# Patient Record
Sex: MALE | Race: WHITE | NOT HISPANIC OR LATINO | Employment: FULL TIME | URBAN - METROPOLITAN AREA
[De-identification: names, ages, dates, MRNs, and addresses within clinical notes are randomized per-mention and may not be internally consistent; named-entity substitution may affect disease eponyms.]

---

## 2017-04-05 ENCOUNTER — OFFICE VISIT (OUTPATIENT)
Dept: OCCUPATIONAL MEDICINE | Age: 67
End: 2017-04-05

## 2017-04-05 DIAGNOSIS — Z02.1 PRE-EMPLOYMENT HEALTH SCREENING EXAMINATION: Primary | ICD-10-CM

## 2017-04-05 PROCEDURE — OH044 DRUG SCREEN HAIR COLLECTION ONLY: Performed by: PREVENTIVE MEDICINE

## 2017-07-19 ENCOUNTER — OFF PREMISE (OUTPATIENT)
Dept: OTHER | Age: 67
End: 2017-07-19

## 2017-07-26 ENCOUNTER — APPOINTMENT (OUTPATIENT)
Dept: LAB | Age: 67
End: 2017-07-26

## 2017-07-26 ENCOUNTER — OCC HEALTH (OUTPATIENT)
Dept: OCCUPATIONAL MEDICINE | Age: 67
End: 2017-07-26

## 2017-07-26 ENCOUNTER — TELEPHONE (OUTPATIENT)
Dept: OCCUPATIONAL MEDICINE | Age: 67
End: 2017-07-26

## 2017-07-26 VITALS
WEIGHT: 224 LBS | HEART RATE: 78 BPM | DIASTOLIC BLOOD PRESSURE: 80 MMHG | SYSTOLIC BLOOD PRESSURE: 128 MMHG | HEIGHT: 68 IN | BODY MASS INDEX: 33.95 KG/M2

## 2017-07-26 DIAGNOSIS — Z02.89 ENCOUNTER FOR OCCUPATIONAL HEALTH EXAMINATION: Primary | ICD-10-CM

## 2017-07-26 DIAGNOSIS — Z02.89 VISIT FOR OCCUPATIONAL HEALTH EXAMINATION: ICD-10-CM

## 2017-07-26 PROCEDURE — 36415 COLL VENOUS BLD VENIPUNCTURE: CPT | Performed by: PHYSICIAN ASSISTANT

## 2017-07-26 PROCEDURE — OH021 PRE PLACEMENT PHYSICAL EXAM: Performed by: PHYSICIAN ASSISTANT

## 2017-07-26 PROCEDURE — OH053 WHISPER TEST PERFORMED IN OCC HEALTH: Performed by: PHYSICIAN ASSISTANT

## 2017-07-26 PROCEDURE — OH138 COMPREHENSIVE EYE EXAM: Performed by: PHYSICIAN ASSISTANT

## 2018-03-26 DIAGNOSIS — R97.20 ELEVATED PSA: Primary | ICD-10-CM

## 2018-04-23 ENCOUNTER — HOSPITAL ENCOUNTER (OUTPATIENT)
Age: 68
Discharge: HOME | End: 2018-04-23

## 2018-04-23 PROCEDURE — 86481 TB AG RESPONSE T-CELL SUSP: CPT

## 2018-04-26 LAB — T-SPOT TB TEST: NORMAL

## 2018-06-01 ENCOUNTER — OFFICE VISIT (OUTPATIENT)
Dept: UROLOGY | Age: 68
End: 2018-06-01
Payer: COMMERCIAL

## 2018-06-01 ENCOUNTER — TELEPHONE (OUTPATIENT)
Dept: UROLOGY | Age: 68
End: 2018-06-01

## 2018-06-01 ENCOUNTER — OFFICE VISIT (OUTPATIENT)
Dept: INTERNAL MEDICINE | Age: 68
End: 2018-06-01
Payer: COMMERCIAL

## 2018-06-01 ENCOUNTER — HOSPITAL ENCOUNTER (OUTPATIENT)
Age: 68
Discharge: HOME | End: 2018-06-01
Payer: COMMERCIAL

## 2018-06-01 VITALS
HEART RATE: 58 BPM | WEIGHT: 224 LBS | DIASTOLIC BLOOD PRESSURE: 84 MMHG | OXYGEN SATURATION: 92 % | SYSTOLIC BLOOD PRESSURE: 132 MMHG

## 2018-06-01 VITALS
HEIGHT: 67 IN | DIASTOLIC BLOOD PRESSURE: 84 MMHG | WEIGHT: 228 LBS | BODY MASS INDEX: 35.79 KG/M2 | HEART RATE: 62 BPM | SYSTOLIC BLOOD PRESSURE: 134 MMHG

## 2018-06-01 DIAGNOSIS — Z00.00 WELL ADULT EXAM: Primary | ICD-10-CM

## 2018-06-01 DIAGNOSIS — Z13.220 SCREENING FOR HYPERLIPIDEMIA: ICD-10-CM

## 2018-06-01 DIAGNOSIS — Z23 NEED FOR PROPHYLACTIC VACCINATION AND INOCULATION AGAINST VARICELLA: ICD-10-CM

## 2018-06-01 DIAGNOSIS — F51.01 PRIMARY INSOMNIA: ICD-10-CM

## 2018-06-01 DIAGNOSIS — I49.3 FREQUENT PVCS: ICD-10-CM

## 2018-06-01 DIAGNOSIS — R39.9 LOWER URINARY TRACT SYMPTOMS (LUTS): ICD-10-CM

## 2018-06-01 DIAGNOSIS — R97.20 ELEVATED PSA: Primary | ICD-10-CM

## 2018-06-01 DIAGNOSIS — I10 BENIGN ESSENTIAL HTN: ICD-10-CM

## 2018-06-01 DIAGNOSIS — Z00.00 WELL ADULT EXAM: ICD-10-CM

## 2018-06-01 LAB
APPEARANCE FLUID: ABNORMAL
BILIRUBIN, POC: ABNORMAL
BLOOD URINE, POC: ABNORMAL
CLARITY, POC: CLEAR
COLOR, POC: YELLOW
GLUCOSE URINE, POC: ABNORMAL
HEPATITIS C ANTIBODY: NONREACTIVE
KETONES, POC: ABNORMAL
LEUKOCYTE EST, POC: ABNORMAL
NITRITE, POC: ABNORMAL
PH, POC: 6
PROTEIN, POC: ABNORMAL
SPECIFIC GRAVITY, POC: 1.01
UROBILINOGEN, POC: 0.2

## 2018-06-01 PROCEDURE — 86803 HEPATITIS C AB TEST: CPT

## 2018-06-01 PROCEDURE — 36415 COLL VENOUS BLD VENIPUNCTURE: CPT

## 2018-06-01 PROCEDURE — 99999 POCT URINE DIPSTICK: CPT | Performed by: UROLOGY

## 2018-06-01 PROCEDURE — 99204 OFFICE O/P NEW MOD 45 MIN: CPT | Performed by: UROLOGY

## 2018-06-01 PROCEDURE — 99204 OFFICE O/P NEW MOD 45 MIN: CPT

## 2018-06-01 PROCEDURE — 81002 URINALYSIS NONAUTO W/O SCOPE: CPT | Performed by: UROLOGY

## 2018-06-01 PROCEDURE — 99387 INIT PM E/M NEW PAT 65+ YRS: CPT | Performed by: INTERNAL MEDICINE

## 2018-06-01 RX ORDER — CIPROFLOXACIN 500 MG/1
500 TABLET, FILM COATED ORAL 2 TIMES DAILY
Qty: 6 TABLET | Refills: 0 | Status: SHIPPED | OUTPATIENT
Start: 2018-06-01 | End: 2018-06-04

## 2018-06-01 RX ORDER — NITROGLYCERIN 0.4 MG/1
0.4 TABLET SUBLINGUAL EVERY 5 MIN PRN
COMMUNITY
Start: 2018-03-20 | End: 2020-08-27 | Stop reason: SDUPTHER

## 2018-06-01 RX ORDER — CLONIDINE HYDROCHLORIDE 0.1 MG/1
0.1 TABLET ORAL 2 TIMES DAILY
Qty: 180 TABLET | Refills: 3 | Status: SHIPPED | OUTPATIENT
Start: 2018-06-01 | End: 2019-03-06 | Stop reason: SDUPTHER

## 2018-06-01 RX ORDER — ALLOPURINOL 300 MG/1
300 TABLET ORAL DAILY
Qty: 90 TABLET | Refills: 3 | Status: SHIPPED | OUTPATIENT
Start: 2018-06-01 | End: 2019-03-06 | Stop reason: SDUPTHER

## 2018-06-01 RX ORDER — CLONIDINE HYDROCHLORIDE 0.1 MG/1
TABLET ORAL
COMMUNITY
Start: 2018-03-20 | End: 2018-06-01 | Stop reason: SDUPTHER

## 2018-06-01 RX ORDER — AMLODIPINE BESYLATE 5 MG/1
5 TABLET ORAL
COMMUNITY
Start: 2018-03-20 | End: 2018-06-01 | Stop reason: SDUPTHER

## 2018-06-01 RX ORDER — CEPHALEXIN 500 MG/1
500 CAPSULE ORAL 3 TIMES DAILY
Qty: 15 CAPSULE | Refills: 0 | Status: SHIPPED | OUTPATIENT
Start: 2018-06-01 | End: 2018-06-06

## 2018-06-01 RX ORDER — ROSUVASTATIN CALCIUM 10 MG/1
10 TABLET, COATED ORAL DAILY
Qty: 90 TABLET | Refills: 3 | Status: SHIPPED | OUTPATIENT
Start: 2018-06-01 | End: 2019-03-06 | Stop reason: SDUPTHER

## 2018-06-01 RX ORDER — MELOXICAM 7.5 MG/1
7.5 TABLET ORAL 2 TIMES DAILY
Qty: 180 TABLET | Refills: 3 | Status: SHIPPED | OUTPATIENT
Start: 2018-06-01 | End: 2019-03-06 | Stop reason: SDUPTHER

## 2018-06-01 RX ORDER — FUROSEMIDE 20 MG/1
20 TABLET ORAL
COMMUNITY
Start: 2017-03-31 | End: 2018-06-01 | Stop reason: SDUPTHER

## 2018-06-01 RX ORDER — AMLODIPINE BESYLATE 5 MG/1
5 TABLET ORAL DAILY
Qty: 90 TABLET | Refills: 3 | Status: SHIPPED | OUTPATIENT
Start: 2018-06-01 | End: 2019-03-06 | Stop reason: SDUPTHER

## 2018-06-01 RX ORDER — LISINOPRIL 10 MG/1
10 TABLET ORAL DAILY
Qty: 90 TABLET | Refills: 3 | Status: SHIPPED | OUTPATIENT
Start: 2018-06-01 | End: 2018-06-05 | Stop reason: SDUPTHER

## 2018-06-01 RX ORDER — ROSUVASTATIN CALCIUM 10 MG/1
10 TABLET, COATED ORAL
COMMUNITY
Start: 2018-03-20 | End: 2018-06-01 | Stop reason: SDUPTHER

## 2018-06-01 RX ORDER — FUROSEMIDE 20 MG/1
20 TABLET ORAL DAILY
Qty: 90 TABLET | Refills: 3 | Status: SHIPPED | OUTPATIENT
Start: 2018-06-01 | End: 2019-03-06 | Stop reason: SDUPTHER

## 2018-06-01 RX ORDER — MELOXICAM 7.5 MG/1
7.5 TABLET ORAL
COMMUNITY
Start: 2018-03-20 | End: 2018-06-01 | Stop reason: SDUPTHER

## 2018-06-01 RX ORDER — FLECAINIDE ACETATE 100 MG/1
100 TABLET ORAL 2 TIMES DAILY
Qty: 180 TABLET | Refills: 3 | Status: SHIPPED | OUTPATIENT
Start: 2018-06-01 | End: 2019-03-06 | Stop reason: SDUPTHER

## 2018-06-01 RX ORDER — FLECAINIDE ACETATE 100 MG/1
TABLET ORAL
COMMUNITY
Start: 2018-03-20 | End: 2018-06-01 | Stop reason: SDUPTHER

## 2018-06-01 RX ORDER — ZOLPIDEM TARTRATE 5 MG/1
5 TABLET ORAL
COMMUNITY
Start: 2018-03-20 | End: 2018-06-01 | Stop reason: SDUPTHER

## 2018-06-01 RX ORDER — ALLOPURINOL 300 MG/1
300 TABLET ORAL
COMMUNITY
Start: 2018-03-20 | End: 2018-06-01 | Stop reason: SDUPTHER

## 2018-06-01 RX ORDER — ZOLPIDEM TARTRATE 5 MG/1
5 TABLET ORAL NIGHTLY PRN
Qty: 90 TABLET | Refills: 0 | Status: SHIPPED | OUTPATIENT
Start: 2018-06-01 | End: 2018-06-05 | Stop reason: SDUPTHER

## 2018-06-01 RX ORDER — LISINOPRIL 10 MG/1
TABLET ORAL
COMMUNITY
Start: 2018-03-20 | End: 2018-06-01 | Stop reason: SDUPTHER

## 2018-06-01 ASSESSMENT — ENCOUNTER SYMPTOMS
CHEST TIGHTNESS: 1
EYES NEGATIVE: 1
GASTROINTESTINAL NEGATIVE: 1
ALLERGIC/IMMUNOLOGIC NEGATIVE: 1

## 2018-06-01 ASSESSMENT — PATIENT HEALTH QUESTIONNAIRE - PHQ9
SUM OF ALL RESPONSES TO PHQ QUESTIONS 1-9: 0
2. FEELING DOWN, DEPRESSED OR HOPELESS: 0
SUM OF ALL RESPONSES TO PHQ9 QUESTIONS 1 & 2: 0
1. LITTLE INTEREST OR PLEASURE IN DOING THINGS: 0

## 2018-06-02 ENCOUNTER — NURSE TRIAGE (OUTPATIENT)
Dept: OTHER | Facility: CLINIC | Age: 68
End: 2018-06-02

## 2018-06-05 ENCOUNTER — TELEPHONE (OUTPATIENT)
Dept: INTERNAL MEDICINE | Age: 68
End: 2018-06-05

## 2018-06-05 DIAGNOSIS — F51.01 PRIMARY INSOMNIA: ICD-10-CM

## 2018-06-05 DIAGNOSIS — Z12.11 COLON CANCER SCREENING: Primary | ICD-10-CM

## 2018-06-05 DIAGNOSIS — G47.30 SLEEP APNEA, UNSPECIFIED TYPE: Primary | ICD-10-CM

## 2018-06-05 DIAGNOSIS — G47.33 OSA ON CPAP: Primary | ICD-10-CM

## 2018-06-05 DIAGNOSIS — Z99.89 OSA ON CPAP: Primary | ICD-10-CM

## 2018-06-05 RX ORDER — ZOLPIDEM TARTRATE 5 MG/1
TABLET ORAL
Qty: 135 TABLET | Refills: 1 | Status: SHIPPED | OUTPATIENT
Start: 2018-06-05 | End: 2018-12-03 | Stop reason: SDUPTHER

## 2018-06-05 RX ORDER — NEBULIZER ACCESSORIES
KIT MISCELLANEOUS
Qty: 1 DEVICE | Refills: 0 | Status: SHIPPED | OUTPATIENT
Start: 2018-06-05 | End: 2019-04-22

## 2018-06-05 RX ORDER — LISINOPRIL 10 MG/1
TABLET ORAL
Qty: 225 TABLET | Refills: 1 | Status: SHIPPED | OUTPATIENT
Start: 2018-06-05 | End: 2018-12-03 | Stop reason: SDUPTHER

## 2018-06-11 DIAGNOSIS — R97.20 ELEVATED PSA: Primary | ICD-10-CM

## 2018-06-12 ENCOUNTER — TELEPHONE (OUTPATIENT)
Dept: UROLOGY | Age: 68
End: 2018-06-12

## 2018-06-12 DIAGNOSIS — R97.20 ELEVATED PSA: Primary | ICD-10-CM

## 2018-06-15 ENCOUNTER — TELEPHONE (OUTPATIENT)
Dept: UROLOGY | Age: 68
End: 2018-06-15

## 2018-06-18 ENCOUNTER — OFFICE VISIT (OUTPATIENT)
Dept: UROLOGY | Age: 68
End: 2018-06-18
Payer: COMMERCIAL

## 2018-06-18 VITALS
SYSTOLIC BLOOD PRESSURE: 118 MMHG | BODY MASS INDEX: 35.85 KG/M2 | HEIGHT: 67 IN | WEIGHT: 228.4 LBS | DIASTOLIC BLOOD PRESSURE: 74 MMHG

## 2018-06-18 DIAGNOSIS — R39.15 URINARY URGENCY: Primary | ICD-10-CM

## 2018-06-18 LAB
BILIRUBIN URINE: NEGATIVE
BLOOD URINE, POC: NEGATIVE
CHARACTER, URINE: CLEAR
COLOR, URINE: YELLOW
GLUCOSE URINE: NEGATIVE MG/DL
KETONES, URINE: NEGATIVE
LEUKOCYTE CLUMPS, URINE: ABNORMAL
NITRITE, URINE: NEGATIVE
PH, URINE: 6
POST VOID RESIDUAL (PVR): 100 ML
PROTEIN, URINE: NEGATIVE MG/DL
SPECIFIC GRAVITY, URINE: 1.01 (ref 1–1.03)
UROBILINOGEN, URINE: 0.2 EU/DL

## 2018-06-18 PROCEDURE — 99244 OFF/OP CNSLTJ NEW/EST MOD 40: CPT | Performed by: UROLOGY

## 2018-06-18 PROCEDURE — 51798 US URINE CAPACITY MEASURE: CPT | Performed by: UROLOGY

## 2018-06-18 PROCEDURE — 81003 URINALYSIS AUTO W/O SCOPE: CPT | Performed by: UROLOGY

## 2018-06-18 RX ORDER — TAMSULOSIN HYDROCHLORIDE 0.4 MG/1
0.4 CAPSULE ORAL NIGHTLY
Qty: 90 CAPSULE | Refills: 3 | Status: ON HOLD | OUTPATIENT
Start: 2018-06-18 | End: 2018-11-10 | Stop reason: HOSPADM

## 2018-06-18 ASSESSMENT — ENCOUNTER SYMPTOMS
EYE PAIN: 0
GASTROINTESTINAL NEGATIVE: 1
COLOR CHANGE: 0
SHORTNESS OF BREATH: 0
FACIAL SWELLING: 0
EYES NEGATIVE: 1
RESPIRATORY NEGATIVE: 1
CHEST TIGHTNESS: 0
ABDOMINAL PAIN: 0
EYE REDNESS: 0
NAUSEA: 0
BACK PAIN: 1

## 2018-06-27 ENCOUNTER — TELEPHONE (OUTPATIENT)
Dept: INTERNAL MEDICINE | Age: 68
End: 2018-06-27

## 2018-06-29 ENCOUNTER — HOSPITAL ENCOUNTER (OUTPATIENT)
Dept: MRI IMAGING | Age: 68
Discharge: HOME | End: 2018-06-29
Payer: COMMERCIAL

## 2018-06-29 DIAGNOSIS — R97.20 ELEVATED PSA: ICD-10-CM

## 2018-06-29 LAB — POC CREATININE WHOLE BLOOD: 1.2 MG/DL (ref 0.5–1.2)

## 2018-06-29 PROCEDURE — A9579 GAD-BASE MR CONTRAST NOS,1ML: HCPCS | Performed by: UROLOGY

## 2018-06-29 PROCEDURE — 82565 ASSAY OF CREATININE: CPT

## 2018-06-29 PROCEDURE — 72197 MRI PELVIS W/O & W/DYE: CPT

## 2018-06-29 PROCEDURE — 6360000004 HC RX CONTRAST MEDICATION: Performed by: UROLOGY

## 2018-06-29 RX ADMIN — GADOTERIDOL 20 ML: 279.3 INJECTION, SOLUTION INTRAVENOUS at 19:11

## 2018-07-01 PROBLEM — Z00.00 WELL ADULT EXAM: Status: RESOLVED | Noted: 2018-06-01 | Resolved: 2018-07-01

## 2018-07-01 PROBLEM — Z13.220 SCREENING FOR HYPERLIPIDEMIA: Status: RESOLVED | Noted: 2018-06-01 | Resolved: 2018-07-01

## 2018-07-02 ENCOUNTER — TELEPHONE (OUTPATIENT)
Dept: UROLOGY | Age: 68
End: 2018-07-02

## 2018-07-03 NOTE — TELEPHONE ENCOUNTER
Please let Patience Patel know that MRI showed two score 3 lesions. These lesions are intermediate and the presence of clinically significant cancer is equivocal. Dr. Carlee Calderon suggests we biopsy any grade 3 or higher lesions seen on MRI. Does patient wish to proceed with MRI guided prostate biopsy?

## 2018-07-05 ENCOUNTER — TELEPHONE (OUTPATIENT)
Dept: UROLOGY | Age: 68
End: 2018-07-05

## 2018-07-05 DIAGNOSIS — R97.20 ELEVATED PSA: Primary | ICD-10-CM

## 2018-07-08 RX ORDER — CIPROFLOXACIN 500 MG/1
TABLET, FILM COATED ORAL
Qty: 6 TABLET | Refills: 0 | Status: ON HOLD | OUTPATIENT
Start: 2018-07-08 | End: 2018-11-09 | Stop reason: ALTCHOICE

## 2018-07-08 RX ORDER — GENTAMICIN SULFATE 40 MG/ML
80 INJECTION, SOLUTION INTRAMUSCULAR; INTRAVENOUS ONCE
Qty: 2 ML | Refills: 0 | Status: SHIPPED | OUTPATIENT
Start: 2018-07-08 | End: 2018-07-08

## 2018-07-09 ENCOUNTER — TELEPHONE (OUTPATIENT)
Dept: UROLOGY | Age: 68
End: 2018-07-09

## 2018-07-09 DIAGNOSIS — R97.20 ELEVATED PSA: Primary | ICD-10-CM

## 2018-07-09 RX ORDER — LORAZEPAM 1 MG/1
1 TABLET ORAL ONCE
Qty: 1 TABLET | Refills: 0 | Status: SHIPPED | OUTPATIENT
Start: 2018-07-09 | End: 2018-07-09

## 2018-07-09 NOTE — TELEPHONE ENCOUNTER
Melinda Garg,    Dr. Judson Ramirez left a message stating he was returning your call from Friday. His call back number is; [phone]. Please advise, thank you.

## 2018-07-18 ENCOUNTER — TELEPHONE (OUTPATIENT)
Dept: UROLOGY | Age: 68
End: 2018-07-18

## 2018-07-18 NOTE — TELEPHONE ENCOUNTER
Patient called office to correct his mailing address. Reviewed his prep instructions for Trus biopsy. Gentamycin will be given from stock supply. Consent to be signed on arrival.  Patient voiced understanding. Patient notes that he will be OOT the next two weeks. Instructions mailed to him.

## 2018-07-18 NOTE — TELEPHONE ENCOUNTER
Voice message left for pt to call the office to confirm mailing address. His instructions for the [address] [place] [address] Bx appointment was returned to the office.

## 2018-07-24 ENCOUNTER — TELEPHONE (OUTPATIENT)
Dept: UROLOGY | Age: 68
End: 2018-07-24

## 2018-07-24 NOTE — TELEPHONE ENCOUNTER
Dr. Vilma Gonzalez he mean referral to genetic counseling? Or is he talking about possible Decipher or Confirm MDX after biopsy? He is scheduled for biopsy on 8/9/18. Not sure what you discussed at appointment.

## 2018-07-24 NOTE — TELEPHONE ENCOUNTER
Dr. Janell Rodriguez,    Patient left message on voicemail today stating he \"would like to go through with the genetic testing due to his family hx\"    Please advise, thank you.

## 2018-07-25 NOTE — TELEPHONE ENCOUNTER
Marsh Pallas him we can send biopsy for decipher testing if it comes back positive for Althea 6. We will send for Confirm MDX if it's negative.

## 2018-08-09 ENCOUNTER — PROCEDURE VISIT (OUTPATIENT)
Dept: UROLOGY | Age: 68
End: 2018-08-09
Payer: COMMERCIAL

## 2018-08-09 VITALS
DIASTOLIC BLOOD PRESSURE: 70 MMHG | SYSTOLIC BLOOD PRESSURE: 122 MMHG | WEIGHT: 223 LBS | HEIGHT: 67 IN | BODY MASS INDEX: 35 KG/M2

## 2018-08-09 DIAGNOSIS — R93.5 ABNORMAL MRI, PELVIS: ICD-10-CM

## 2018-08-09 DIAGNOSIS — R97.20 ELEVATED PSA: Primary | ICD-10-CM

## 2018-08-09 PROCEDURE — 99999 PR SONO GUIDE NEEDLE BIOPSY: CPT | Performed by: UROLOGY

## 2018-08-09 PROCEDURE — 76872 US TRANSRECTAL: CPT | Performed by: UROLOGY

## 2018-08-09 PROCEDURE — 55700 PR BIOPSY OF PROSTATE,NEEDLE/PUNCH: CPT | Performed by: UROLOGY

## 2018-08-09 PROCEDURE — 96372 THER/PROPH/DIAG INJ SC/IM: CPT | Performed by: UROLOGY

## 2018-08-09 PROCEDURE — 99999 PR OFFICE/OUTPT VISIT,PROCEDURE ONLY: CPT | Performed by: UROLOGY

## 2018-08-09 RX ORDER — GENTAMICIN SULFATE 40 MG/ML
80 INJECTION, SOLUTION INTRAMUSCULAR; INTRAVENOUS ONCE
Status: COMPLETED | OUTPATIENT
Start: 2018-08-09 | End: 2018-08-09

## 2018-08-09 RX ADMIN — GENTAMICIN SULFATE 80 MG: 40 INJECTION, SOLUTION INTRAMUSCULAR; INTRAVENOUS at 09:21

## 2018-08-09 NOTE — PROGRESS NOTES
Mr. Denisse Zapien was seen in follow up for his prostate biopsy. The biopsy was indicated and is being performed for Elevated PSA. The biopsy is being done with MRI / Ultrasound fusion using the Speak With Me system. The biopsy was done without difficulty or complication. TRUS prostate biopsy note:  After obtaining informed consent, the rectal ultrasound probe was passed per rectum and the prostate visualized. A local block was performed by instilling 2% lidocaine at the base. Measurements were taken and the prostate measured 5.40 x 5.23 x 5.02 mm for a total volume of 74.22 cc. At this point, prostate biopsy was performed. Using ERUCES, the ultrasound and MRI images were fused and then biopsies of the lesions identified by the radiologist were taken. Three cores were taken from each of the 2 lesions seen on MRI. Two cores were then  taken at the base, the mid-portion, and the apex of the gland on either side for a total of 12 cores. The rectal probe was removed and there was minimal bleeding. Mr. Denisse Zapien tolerated the procedure well and there were no complications. Prostate size: 74.22 cc  Prostatic calcifications: no   Hypoechoic areas: no  Cores taken: 6 + 3 cores each from 2 lesions making 12 total cores  Complications: none      Assessment:      Prostate biopsy performed without difficulty. This was done with UroNav MRI fused guidance. Plan:        I will see Ana Maria Carranza back to discuss the pathology in 1-2 weeks. Further recommendations will be based on these results.

## 2018-08-09 NOTE — PROGRESS NOTES
Procedure: Trus/Biopsy  Pt name and birth date verified Yes  Patient agrees  Is taking biopsies of the prostate Yes  Patient took Enema 2 hours prior to procedure Yes  Patient took  pill(s) of cipro day before procedure, day of, and will the day after Yes  Has patient eaten today? Banana  Patient stopped all blood thinners prior to surgery Yes-Mobic stopped last Friday, no asa 81 mg for over 2 weeks       Following Dr. Anisha Ladd of care.   Gentamicin 80MG/2ML GIVEN I.M Rt UOQ HIP  Lot Number: [id number]  Expiration Date: 12-1-2018  Dwight Little 47 #: [id number]    Patient supplied their own medications Yes

## 2018-08-17 ENCOUNTER — TELEPHONE (OUTPATIENT)
Dept: UROLOGY | Age: 68
End: 2018-08-17

## 2018-08-27 ENCOUNTER — TELEPHONE (OUTPATIENT)
Dept: UROLOGY | Age: 68
End: 2018-08-27

## 2018-08-29 ENCOUNTER — TELEPHONE (OUTPATIENT)
Dept: UROLOGY | Age: 68
End: 2018-08-29

## 2018-08-29 NOTE — TELEPHONE ENCOUNTER
Patient called in regarding some questions he has for Dr. Keya Wagner. I attempted to call patient to clarify his questions, no answer, I left a message for him to call the office at his convenience.

## 2018-08-31 NOTE — TELEPHONE ENCOUNTER
Dr. Florencia Gaspar,    Mr. Addy Lindsay is wanting to know what the urgency of his surgery is. He is wanting to know if you think the surgery could wait until after the first of the year? Please advise, thank you. Patient also requesting a copy of his path report. I have mailed those results to his home address in [facility].

## 2018-09-06 ENCOUNTER — TELEPHONE (OUTPATIENT)
Dept: UROLOGY | Age: 68
End: 2018-09-06

## 2018-09-06 NOTE — TELEPHONE ENCOUNTER
Dr Aditya Kramer is asking for a call. To discuss his pathology report and the urgency for surgery. Dr Viola Duenas has tried to contact pt on 8/27/18.

## 2018-09-10 ENCOUNTER — TELEPHONE (OUTPATIENT)
Dept: UROLOGY | Age: 68
End: 2018-09-10

## 2018-09-10 DIAGNOSIS — I10 BENIGN ESSENTIAL HTN: ICD-10-CM

## 2018-09-10 DIAGNOSIS — Z01.818 PRE-OP TESTING: ICD-10-CM

## 2018-09-10 DIAGNOSIS — C61 PROSTATE CANCER (HCC): Primary | ICD-10-CM

## 2018-09-10 NOTE — TELEPHONE ENCOUNTER
Patient returned call. Reviewed Dr Fraga Cost schedule for surgery. Robot assisted laparoscopic radical prostatectomy, pelvis lymph lymph nodes dissection. At [facility]. Dr Niels Chavez will be OOT in October returning on 10/27/18. He is interested in Nov 9, 2018 0730 arrival 0600. Personal fax# [phone]. Patient has been seen by Dr Km Trejo cardiology in [facility]. Stress test, echocardiogram, EKG done recently. Orders for the CBC, BMP, U/A, chest x-ray faxed to personal fax#. Explained that clearance from both PCP (Dr Rudy Rojas) and from Dr Km Trejo will be required. Patient voiced understanding. Explained the referral to [facility] for pelvic floor therapy. Patient states that he has a 1/2 day off. He will contact Cindy's office to make that hunter't. Spoke to Arjun, , for Raisa Michele. Referral faxed. Discussed with pt the bowel prep for the day before the surgery. Instructions will be mailed to pt. Discussed holding the blood thinners, including asa, Mobic the week prior to surgery. He voiced understanding.
[facility]   KEM Pruitt, [address] [facility]  [facility]BA GRANADOS [facility], [address]  [phone]        Surgery Bowel Preparation    Purchase a 10 ounce bottle of Magnesium Citrate at your pharmacy and drink the afternoon before your scheduled surgery. Suggest drinking it chilled. Start a clear liquid diet (for dinner) the evening before surgery. You may have the following:   Water   Apple, grape or cranberry juice   Clear, fat free broth   Clear sodas (7-up, Sprite)   Plain gelatin (Jell-o)   Popsicles without bits of fruit or fruit pulp   Tea or coffee without milk or cream    7:00pm-evening before surgery-Fleets enema. Follow the instructions on the label. Nothing to eat or drink after midnight before your scheduled surgery. Please contact our office at [phone] if you have any questions.
Robotic Surgery Scheduling Form   [address] [facility] [address]    Phone * [phone]   Surgical Scheduling Direct line Phone * [phone]  Fax * [phone]      King Keenan      1950    male    [id number]  [place] [address]  Marital Status:         Home Phone: [phone]   Cell Phone:   Telephone Information:   Mobile [phone]              Surgeon: Dr Yovana Arreaga Surgery Date:11/9/2018 Time: 0730     Procedure: Robot assisted laparoscopic radical prostatectomy with pelvic lymph nodes dissection       Inpatient       Diagnosis: Prostate cancer    Important Medical History: In Epic    Special Inst/Equip: ROBOT    CPT Codes: 18457    Latex Allergy:   no Cardiac Device:  no    Case Location:  Main OR     Preadmission Testing: Phone Call      PAT Date and Time: ________________________________    PAT Confirmation #: _________________________________    Post Op Visit:  ______________________________________    Need Preop Cardiac Clearance:   Appointment with cardiology in [place]    Does patient have Cardiologist/physician?    Dr Kell Lomeli #:  ______________________________________________    [place]: __________________________________ Date:____________________    Firefly:  no    Dual Console:  no   Ultrasound:  no    Single Site: no    RNFA (colon resection only): no Assisting Surgeon: Mini Rivera PA-C    Insurance Company Name:  [employer] [place]
Unacceptable-Communication to Follow  Comments:_____________________________________________________  ________________________________________________________________________  Physician ___________________________  Date:_______________  Physician Printed Name:  _________________________    Romana Peeks  [phone]

## 2018-09-11 ENCOUNTER — TELEPHONE (OUTPATIENT)
Dept: UROLOGY | Age: 68
End: 2018-09-11

## 2018-09-11 NOTE — TELEPHONE ENCOUNTER
Per Bakari Purdy, [address] I sent pt's decipher form in today. Also mailed copy of patient's prostate bx path to his home per Tristin Vasquez. See scan.

## 2018-09-12 ENCOUNTER — HOSPITAL ENCOUNTER (OUTPATIENT)
Age: 68
Discharge: HOME | End: 2018-09-14
Payer: COMMERCIAL

## 2018-09-12 ENCOUNTER — HOSPITAL ENCOUNTER (OUTPATIENT)
Dept: GENERAL RADIOLOGY | Age: 68
Discharge: HOME | End: 2018-09-14
Payer: COMMERCIAL

## 2018-09-12 DIAGNOSIS — C61 PROSTATE CANCER (HCC): ICD-10-CM

## 2018-09-12 DIAGNOSIS — I10 BENIGN ESSENTIAL HTN: ICD-10-CM

## 2018-09-12 DIAGNOSIS — Z01.818 PRE-OP TESTING: ICD-10-CM

## 2018-09-12 PROCEDURE — 71046 X-RAY EXAM CHEST 2 VIEWS: CPT

## 2018-09-13 ENCOUNTER — TELEPHONE (OUTPATIENT)
Dept: UROLOGY | Age: 68
End: 2018-09-13

## 2018-09-13 DIAGNOSIS — C61 PROSTATE CANCER (HCC): Primary | ICD-10-CM

## 2018-09-25 ENCOUNTER — TELEPHONE (OUTPATIENT)
Dept: UROLOGY | Age: 68
End: 2018-09-25

## 2018-09-25 NOTE — TELEPHONE ENCOUNTER
Please let Dr. Makayla Ross know that Decipher came back high risk ands mail him a copy of it.  Thanks

## 2018-09-26 NOTE — TELEPHONE ENCOUNTER
Contacted patient and results of Decipher testing given. Decipher results printed and mailed to patient. He requested the results be faxed to the genetic counselor, Samara Aggarwal. He voiced understanding and was appreciative of the call.

## 2018-10-03 ENCOUNTER — INITIAL CONSULT (OUTPATIENT)
Dept: ONCOLOGY | Age: 68
End: 2018-10-03
Payer: COMMERCIAL

## 2018-10-03 DIAGNOSIS — C61 PROSTATE CANCER (HCC): Primary | ICD-10-CM

## 2018-10-03 DIAGNOSIS — Z80.42 FAMILY HISTORY OF PROSTATE CANCER: ICD-10-CM

## 2018-10-03 PROCEDURE — 96040 PR GENETIC COUNSELING, EACH 30 MIN: CPT | Performed by: GENETIC COUNSELOR, MS

## 2018-10-05 NOTE — PROGRESS NOTES
3 [facility] Program   Hereditary Cancer Risk Assessment     Name: Timmothy Holter   YOB: 1950   Date of Consultation: 10/3/18    Mr. Suzi Morrissey was seen at the [facility] for genetic counseling on 10/3/18. Mr. Suzi Morrissey was referred by Dr. Suzanna Collet, MD due to his  personal and family history of prostate cancer. Mr. Karthik Juarez wife, Allen Rich, was also present at the appointment. PERSONAL AND FAMILY HISTORY   Mr. Suzi Morrissey is a 76 y.o.  male who was recently diagnosed with prostate cancer at age 79 after experiencing a rising PSA. Pathology revealed an invasive adenocarcinoma of grade group 6 and 7. He did complete somatic tumor genetic testing through [facility] which categorized his cancer as high risk. He plans to undergo surgical treatment. Mr. Karthik Juarez family history is significant for his father with prostate cancer diagnosed at age 71 (vashti 8) and his paternal grandfather with prostate cancer diagnosed at age 76 (vashti unknown). He has two paternal uncles living in their 66-80s who have not developed cancer as well as several paternal first cousins. Mr. Suzi Morrissey also has a family history of prostate cancer on his maternal side, including his maternal grandfather diagnosed at an older age and living to age 80. There are several unknown cancers in his maternal family including his maternal aunt who passed away at age 48 and a maternal great uncle. A distant cousin passed away from brain cancer in her 25s. In addition, Mr. Suzi Morrissey has two full sisters who are in their 62s who have not developed cancer. Mr. Suzi Morrissey has 4 sons and numerous grandchildren. Mr. Suzi Morrissey reports [place], [place], [place] and [place] ancestry and denies any known Ashkenazi [place] heritage. RISK ASSESSMENT   We discussed that approximately 5-10% of cancers are due to a hereditary gene mutation which causes an increased risk for certain cancers.

## 2018-10-12 ENCOUNTER — TELEPHONE (OUTPATIENT)
Dept: UROLOGY | Age: 68
End: 2018-10-12

## 2018-10-16 ENCOUNTER — TELEPHONE (OUTPATIENT)
Dept: ONCOLOGY | Age: 68
End: 2018-10-16

## 2018-10-21 NOTE — TELEPHONE ENCOUNTER
Please see if we can get all the PSA values he has had done over the past year or so. I think we are good without a CT scan or bone scan but I would want to see all the PSA values to make a final decision on this. Thanks!     C

## 2018-10-22 ENCOUNTER — TELEPHONE (OUTPATIENT)
Dept: UROLOGY | Age: 68
End: 2018-10-22

## 2018-10-23 ENCOUNTER — TELEPHONE (OUTPATIENT)
Dept: UROLOGY | Age: 68
End: 2018-10-23

## 2018-10-23 NOTE — TELEPHONE ENCOUNTER
Called patient he stated he has only had 2 in the last year, one at [facility] and one in [place], [place]. [facility] was done may 2018, PSA 3.33 PSA percent free 17. One from [facility] in [place] is PSA 4.25 drawn on March 23, 2018. These results are in care everywhere, it wont let me print them just screen shot. I also found an old one in 2017 in care everywhere that was drawn in march, 2017 PSA 3.27.        Please advise   Thanks

## 2018-10-29 ENCOUNTER — HOSPITAL ENCOUNTER (OUTPATIENT)
Age: 68
Discharge: HOME | End: 2018-10-29
Payer: COMMERCIAL

## 2018-10-29 ENCOUNTER — OFFICE VISIT (OUTPATIENT)
Dept: UROLOGY | Age: 68
End: 2018-10-29
Payer: COMMERCIAL

## 2018-10-29 VITALS
BODY MASS INDEX: 34.99 KG/M2 | DIASTOLIC BLOOD PRESSURE: 66 MMHG | SYSTOLIC BLOOD PRESSURE: 124 MMHG | WEIGHT: 222.9 LBS | HEIGHT: 67 IN

## 2018-10-29 DIAGNOSIS — Z01.818 PRE-OP TESTING: ICD-10-CM

## 2018-10-29 DIAGNOSIS — I10 BENIGN ESSENTIAL HTN: ICD-10-CM

## 2018-10-29 DIAGNOSIS — C61 PROSTATE CANCER (HCC): ICD-10-CM

## 2018-10-29 DIAGNOSIS — C61 PROSTATE CANCER (HCC): Primary | ICD-10-CM

## 2018-10-29 LAB
ABSOLUTE EOS #: 0.08 K/UL (ref 0–0.4)
ABSOLUTE IMMATURE GRANULOCYTE: 0 K/UL (ref 0–0.3)
ABSOLUTE LYMPH #: 0.91 K/UL (ref 1–4.8)
ABSOLUTE MONO #: 0.46 K/UL (ref 0.1–0.8)
ANION GAP SERPL CALCULATED.3IONS-SCNC: 15 MMOL/L (ref 9–17)
BASOPHILS # BLD: 0 % (ref 0–2)
BASOPHILS ABSOLUTE: 0 K/UL (ref 0–0.2)
BILIRUBIN URINE: NEGATIVE
BUN BLDV-MCNC: 13 MG/DL (ref 8–23)
BUN/CREAT BLD: NORMAL (ref 9–20)
CALCIUM SERPL-MCNC: 9 MG/DL (ref 8.6–10.4)
CHLORIDE BLD-SCNC: 101 MMOL/L (ref 98–107)
CO2: 21 MMOL/L (ref 20–31)
COLOR: YELLOW
COMMENT UA: NORMAL
CREAT SERPL-MCNC: 1.09 MG/DL (ref 0.7–1.2)
DIFFERENTIAL TYPE: ABNORMAL
EOSINOPHILS RELATIVE PERCENT: 1 % (ref 1–4)
GFR AFRICAN AMERICAN: >60 ML/MIN
GFR NON-AFRICAN AMERICAN: >60 ML/MIN
GFR SERPL CREATININE-BSD FRML MDRD: NORMAL ML/MIN/{1.73_M2}
GFR SERPL CREATININE-BSD FRML MDRD: NORMAL ML/MIN/{1.73_M2}
GLUCOSE BLD-MCNC: 94 MG/DL (ref 70–99)
GLUCOSE URINE: NEGATIVE
HCT VFR BLD CALC: 43.1 % (ref 40.7–50.3)
HEMOGLOBIN: 14.2 G/DL (ref 13–17)
IMMATURE GRANULOCYTES: 0 %
KETONES, URINE: NEGATIVE
LEUKOCYTE ESTERASE, URINE: NEGATIVE
LYMPHOCYTES # BLD: 12 % (ref 24–44)
MCH RBC QN AUTO: 31.3 PG (ref 25.2–33.5)
MCHC RBC AUTO-ENTMCNC: 32.9 G/DL (ref 28.4–34.8)
MCV RBC AUTO: 94.9 FL (ref 82.6–102.9)
MONOCYTES # BLD: 6 % (ref 1–7)
MORPHOLOGY: NORMAL
NITRITE, URINE: NEGATIVE
NRBC AUTOMATED: 0 PER 100 WBC
PDW BLD-RTO: 13.2 % (ref 11.8–14.4)
PH UA: 5 (ref 5–8)
PLATELET # BLD: 223 K/UL (ref 138–453)
PLATELET ESTIMATE: ABNORMAL
PMV BLD AUTO: 10.3 FL (ref 8.1–13.5)
POTASSIUM SERPL-SCNC: 3.9 MMOL/L (ref 3.7–5.3)
PROTEIN UA: NEGATIVE
RBC # BLD: 4.54 M/UL (ref 4.21–5.77)
RBC # BLD: ABNORMAL 10*6/UL
SEG NEUTROPHILS: 81 % (ref 36–66)
SEGMENTED NEUTROPHILS ABSOLUTE COUNT: 6.15 K/UL (ref 1.8–7.7)
SODIUM BLD-SCNC: 137 MMOL/L (ref 135–144)
SPECIFIC GRAVITY UA: 1.01 (ref 1–1.03)
TURBIDITY: CLEAR
URINE HGB: NEGATIVE
UROBILINOGEN, URINE: NORMAL
WBC # BLD: 7.6 K/UL (ref 3.5–11.3)
WBC # BLD: ABNORMAL 10*3/UL

## 2018-10-29 PROCEDURE — 81003 URINALYSIS AUTO W/O SCOPE: CPT

## 2018-10-29 PROCEDURE — 36415 COLL VENOUS BLD VENIPUNCTURE: CPT

## 2018-10-29 PROCEDURE — 80048 BASIC METABOLIC PNL TOTAL CA: CPT

## 2018-10-29 PROCEDURE — 85025 COMPLETE CBC W/AUTO DIFF WBC: CPT

## 2018-10-29 PROCEDURE — 99215 OFFICE O/P EST HI 40 MIN: CPT | Performed by: UROLOGY

## 2018-10-29 ASSESSMENT — ENCOUNTER SYMPTOMS
CHEST TIGHTNESS: 0
DIARRHEA: 1
EYE REDNESS: 0
EYE PAIN: 0
NAUSEA: 0
COLOR CHANGE: 0
BACK PAIN: 1
FACIAL SWELLING: 0
SHORTNESS OF BREATH: 0
ABDOMINAL PAIN: 0

## 2018-10-29 NOTE — PROGRESS NOTES
review. We discussed his options at length and also their associated risks and benefits. He appears to understand and asked appropriate questions. Today more than 50% of Bogdan's visit was spent in these discussions and the total time of his visit was 50 minutes. Past Medical History:   Diagnosis Date    Gout     Hypertension     Osteoarthritis     Sleep apnea        Social History     Social History    Marital status:      Spouse name: N/A    Number of children: N/A    Years of education: N/A     Occupational History    Not on file.      Social History Main Topics    Smoking status: Never Smoker    Smokeless tobacco: Never Used    Alcohol use Not on file    Drug use: Unknown    Sexual activity: Not on file     Other Topics Concern    Not on file     Social History Narrative    No narrative on file       Family History   Problem Relation Age of Onset    Prostate Cancer Father 71    Kidney Cancer Father         62s     Cancer Maternal Aunt         d. 48 unknown cancer     Prostate Cancer Paternal Grandfather 76       Past Surgical History:   Procedure Laterality Date    HAND SURGERY      HIP SURGERY  2012       Allergies   Allergen Reactions    Sulfa Antibiotics Other (See Comments)    Prochlorperazine Edisylate Other (See Comments)     Lose control         Current Outpatient Prescriptions:     ciprofloxacin (CIPRO) 500 MG tablet, Begin medication the morning of the day before prostate biopsy and continue every 12 hours until gone, Disp: 6 tablet, Rfl: 0    tamsulosin (FLOMAX) 0.4 MG capsule, Take 1 capsule by mouth nightly, Disp: 90 capsule, Rfl: 3    lisinopril (PRINIVIL;ZESTRIL) 10 MG tablet, Patient states he Takes 1 pill in the am and 1 and 1/2 pills in the PM, Disp: 225 tablet, Rfl: 1    zolpidem (AMBIEN) 5 MG tablet, Patient states that he takes 1.5 pills every Night., Disp: 135 tablet, Rfl: 1    Respiratory Therapy Supplies (ADULT MASK) GERALD, CPAP mask and tubing.,

## 2018-10-31 ENCOUNTER — TELEPHONE (OUTPATIENT)
Dept: ONCOLOGY | Age: 68
End: 2018-10-31

## 2018-11-01 ENCOUNTER — TELEPHONE (OUTPATIENT)
Dept: UROLOGY | Age: 68
End: 2018-11-01

## 2018-11-01 NOTE — TELEPHONE ENCOUNTER
[facility] [phone] Ronald Javier for the [id number] for the DOS 11/9/18. DRG approved for ref# [id number].

## 2018-11-04 PROBLEM — C61 PROSTATE CANCER (HCC): Status: ACTIVE | Noted: 2018-11-04

## 2018-11-05 ENCOUNTER — HOSPITAL ENCOUNTER (OUTPATIENT)
Age: 68
Discharge: HOME | End: 2018-11-05
Payer: COMMERCIAL

## 2018-11-05 DIAGNOSIS — C61 PROSTATE CANCER (HCC): Primary | ICD-10-CM

## 2018-11-05 LAB — PROSTATE SPECIFIC ANTIGEN: 3.85 UG/L

## 2018-11-05 PROCEDURE — 84153 ASSAY OF PSA TOTAL: CPT

## 2018-11-05 PROCEDURE — 36415 COLL VENOUS BLD VENIPUNCTURE: CPT

## 2018-11-05 NOTE — H&P
History and Physical performed by Dr. Leena Oseguera    Patient ID: Verna Baeza 76 y.o. male 1950          Chief Complaint   Patient presents with    Follow-up       discuss pre-op questions         Other   This is a new (prostate cancer) problem. The current episode started more than 1 month ago. The problem occurs constantly. The problem has been unchanged. Associated symptoms include a fever (d/t cold/sore throat). Pertinent negatives include no abdominal pain, chest pain, chills, headaches, joint swelling, nausea or rash. Nothing aggravates the symptoms. He has tried nothing for the symptoms. The treatment provided no relief.      Mr. Darryle Pao was seen in follow up to discuss his biopsy results. Unfortunately we found prostate cancer in the biopsy cores as follows:     A.  Prostate, right mid and left apex, core biopsy:   Negative for malignancy.     Benign hyperplasia. B.  Prostate, right mid and base, core biopsies:   Invasive prostatic adenocarcinoma, Cartwright score 3+3 = 6, Grade Group  1.   One out of 2 cores positive, 1 mm/31 mm (3%).   High-grade PIN. C.  Prostate, left apex, core biopsy:   Chronic prostatitis.   Negative for malignancy. D.  Prostate, left mid, core biopsy:   Benign stromal hyperplasia.   Negative for malignancy. E.  Prostate, lesion #1, core biopsy:   Invasive prostatic adenocarcinoma, Cartwright score 3+4 = 7, Grade Group  2.   3 out of 3 cores positive, 18 mm/39 mm, discontinuous, (46%).   Perineural invasion. F. Prostate, lesion #2, core biopsy:   Chronic inflammation and stromal hyperplasia.     His most recent PSA was 3.33  Prostate size measured 74.22 cc at ultrasound for prostate biopsy.     Today we discussed prostate cancer in general and Bogdan's prostate cancer in specific. We discussed possible treatment options ranging from active surveillance, to radiation, to surgery. We also discussed alternative therapies such as cryotherapy and HIFU.   I have given him a book (3%).   High-grade PIN. C.  Prostate, left apex, core biopsy:   Chronic prostatitis.   Negative for malignancy. D.  Prostate, left mid, core biopsy:   Benign stromal hyperplasia.   Negative for malignancy. E.  Prostate, lesion #1, core biopsy:   Invasive prostatic adenocarcinoma, [place] score 3+4 = 7, Grade Group  2.   3 out of 3 cores positive, 18 mm/39 mm, discontinuous, (46%).   Perineural invasion. F. Prostate, lesion #2, core biopsy:   Chronic inflammation and stromal hyperplasia.     Prostate genomic testing: Decipher testing:             Assessment:     Diagnosis Orders   1. Prostate cancer [facility])            Mr. Caleb Johnson presents today in follow-up [facility] [place].     Today we had a long discussion about his disease and about upcoming plans for surgery. We discussed lymph node dissection and the limited dissection that we will be performing. We discussed nerve sparing and the use of umbilical cord in a nerve wrap to help promote healing. We discussed the surgical procedure in detail.     Today we're able to answer all of his questions and also questions of his wife. She was on the phone that she was feeling ill and was at home.     We will proceed with surgery as scheduled. We will perform a RALRP with bilateral pelvic lymph node dissection and nerve wrap.     I have reviewed all notes sent along with this referral including notes from a recent visit to his primary care provider. These records demonstrated the following past medical history:  Past Medical History        Past Medical History:   Diagnosis Date    Gout      Hypertension      Osteoarthritis      Sleep apnea           He has genetic testing which is pending.     Plan:      Mr. Caleb Jhonson presents for discussion of his prostate cancer.   All treatment options, including active surveillance, radiation therapy and surgery and other treatment modalities such as cryotherapy, were discussed.     Mr. Caleb Johnson is interested in

## 2018-11-09 ENCOUNTER — HOSPITAL ENCOUNTER (INPATIENT)
Age: 68
LOS: 2 days | Discharge: HOME | DRG: 708 | End: 2018-11-11
Attending: UROLOGY | Admitting: UROLOGY
Payer: COMMERCIAL

## 2018-11-09 ENCOUNTER — ANESTHESIA (OUTPATIENT)
Dept: OPERATING ROOM | Age: 68
DRG: 708 | End: 2018-11-09
Payer: COMMERCIAL

## 2018-11-09 ENCOUNTER — ANESTHESIA EVENT (OUTPATIENT)
Dept: OPERATING ROOM | Age: 68
DRG: 708 | End: 2018-11-09
Payer: COMMERCIAL

## 2018-11-09 VITALS
RESPIRATION RATE: 2 BRPM | OXYGEN SATURATION: 100 % | TEMPERATURE: 97.2 F | DIASTOLIC BLOOD PRESSURE: 58 MMHG | SYSTOLIC BLOOD PRESSURE: 113 MMHG

## 2018-11-09 DIAGNOSIS — C61 PROSTATE CANCER (HCC): Primary | ICD-10-CM

## 2018-11-09 LAB
ABO: NORMAL
ANTIBODY SCREEN: NORMAL
POTASSIUM SERPL-SCNC: 3.8 MEQ/L (ref 3.5–5.2)
RH FACTOR: NORMAL

## 2018-11-09 PROCEDURE — 36415 COLL VENOUS BLD VENIPUNCTURE: CPT

## 2018-11-09 PROCEDURE — 2580000003 HC RX 258: Performed by: NURSE ANESTHETIST, CERTIFIED REGISTERED

## 2018-11-09 PROCEDURE — 6360000002 HC RX W HCPCS: Performed by: PHYSICIAN ASSISTANT

## 2018-11-09 PROCEDURE — 2500000003 HC RX 250 WO HCPCS: Performed by: UROLOGY

## 2018-11-09 PROCEDURE — 86900 BLOOD TYPING SEROLOGIC ABO: CPT

## 2018-11-09 PROCEDURE — 1200000000 HC SEMI PRIVATE

## 2018-11-09 PROCEDURE — 7100000000 HC PACU RECOVERY - FIRST 15 MIN: Performed by: UROLOGY

## 2018-11-09 PROCEDURE — 86901 BLOOD TYPING SEROLOGIC RH(D): CPT

## 2018-11-09 PROCEDURE — 55866 LAPS SURG PRST8ECT RPBIC RAD: CPT | Performed by: PHYSICIAN ASSISTANT

## 2018-11-09 PROCEDURE — 6360000002 HC RX W HCPCS

## 2018-11-09 PROCEDURE — 6360000002 HC RX W HCPCS: Performed by: NURSE ANESTHETIST, CERTIFIED REGISTERED

## 2018-11-09 PROCEDURE — 6370000000 HC RX 637 (ALT 250 FOR IP): Performed by: NURSE PRACTITIONER

## 2018-11-09 PROCEDURE — 3600000019 HC SURGERY ROBOT ADDTL 15MIN: Performed by: UROLOGY

## 2018-11-09 PROCEDURE — 15777 ACELLULAR DERM MATRIX IMPLT: CPT | Performed by: UROLOGY

## 2018-11-09 PROCEDURE — 99999 PR OFFICE/OUTPT VISIT,PROCEDURE ONLY: CPT | Performed by: UROLOGY

## 2018-11-09 PROCEDURE — 6370000000 HC RX 637 (ALT 250 FOR IP)

## 2018-11-09 PROCEDURE — 38571 LAPAROSCOPY LYMPHADENECTOMY: CPT | Performed by: PHYSICIAN ASSISTANT

## 2018-11-09 PROCEDURE — 6360000002 HC RX W HCPCS: Performed by: UROLOGY

## 2018-11-09 PROCEDURE — 2500000003 HC RX 250 WO HCPCS: Performed by: NURSE ANESTHETIST, CERTIFIED REGISTERED

## 2018-11-09 PROCEDURE — 88307 TISSUE EXAM BY PATHOLOGIST: CPT

## 2018-11-09 PROCEDURE — 7100000001 HC PACU RECOVERY - ADDTL 15 MIN: Performed by: UROLOGY

## 2018-11-09 PROCEDURE — 2580000003 HC RX 258: Performed by: PHYSICIAN ASSISTANT

## 2018-11-09 PROCEDURE — 8E0W4CZ ROBOTIC ASSISTED PROCEDURE OF TRUNK REGION, PERCUTANEOUS ENDOSCOPIC APPROACH: ICD-10-PCS | Performed by: UROLOGY

## 2018-11-09 PROCEDURE — 2580000003 HC RX 258

## 2018-11-09 PROCEDURE — 38571 LAPAROSCOPY LYMPHADENECTOMY: CPT | Performed by: UROLOGY

## 2018-11-09 PROCEDURE — 6360000002 HC RX W HCPCS: Performed by: ANESTHESIOLOGY

## 2018-11-09 PROCEDURE — 07BC4ZZ EXCISION OF PELVIS LYMPHATIC, PERCUTANEOUS ENDOSCOPIC APPROACH: ICD-10-PCS | Performed by: UROLOGY

## 2018-11-09 PROCEDURE — 3700000000 HC ANESTHESIA ATTENDED CARE: Performed by: UROLOGY

## 2018-11-09 PROCEDURE — 55866 LAPS SURG PRST8ECT RPBIC RAD: CPT | Performed by: UROLOGY

## 2018-11-09 PROCEDURE — 6370000000 HC RX 637 (ALT 250 FOR IP): Performed by: PHYSICIAN ASSISTANT

## 2018-11-09 PROCEDURE — 88305 TISSUE EXAM BY PATHOLOGIST: CPT

## 2018-11-09 PROCEDURE — 86850 RBC ANTIBODY SCREEN: CPT

## 2018-11-09 PROCEDURE — 3600000009 HC SURGERY ROBOT BASE: Performed by: UROLOGY

## 2018-11-09 PROCEDURE — 2709999900 HC NON-CHARGEABLE SUPPLY

## 2018-11-09 PROCEDURE — C1773 RET DEV, INSERTABLE: HCPCS | Performed by: UROLOGY

## 2018-11-09 PROCEDURE — S2900 ROBOTIC SURGICAL SYSTEM: HCPCS | Performed by: UROLOGY

## 2018-11-09 PROCEDURE — 88309 TISSUE EXAM BY PATHOLOGIST: CPT

## 2018-11-09 PROCEDURE — 84132 ASSAY OF SERUM POTASSIUM: CPT

## 2018-11-09 PROCEDURE — 2780000010 HC IMPLANT OTHER: Performed by: UROLOGY

## 2018-11-09 PROCEDURE — 0VT04ZZ RESECTION OF PROSTATE, PERCUTANEOUS ENDOSCOPIC APPROACH: ICD-10-PCS | Performed by: UROLOGY

## 2018-11-09 PROCEDURE — 2709999900 HC NON-CHARGEABLE SUPPLY: Performed by: UROLOGY

## 2018-11-09 PROCEDURE — 3700000001 HC ADD 15 MINUTES (ANESTHESIA): Performed by: UROLOGY

## 2018-11-09 DEVICE — Z DUP USE 2648674 GRAFT HUM TISS W6XL3CM THK1MM UMB CRD AMNIO MEM CLARIX 1K: Type: IMPLANTABLE DEVICE | Status: FUNCTIONAL

## 2018-11-09 DEVICE — AGENT HEMOSTATIC SURGIFLOW MATRIX KIT W/THROMBIN: Type: IMPLANTABLE DEVICE | Status: FUNCTIONAL

## 2018-11-09 RX ORDER — FUROSEMIDE 20 MG/1
20 TABLET ORAL DAILY
Status: DISCONTINUED | OUTPATIENT
Start: 2018-11-09 | End: 2018-11-11 | Stop reason: HOSPADM

## 2018-11-09 RX ORDER — KETOROLAC TROMETHAMINE 30 MG/ML
30 INJECTION, SOLUTION INTRAMUSCULAR; INTRAVENOUS EVERY 6 HOURS PRN
Status: COMPLETED | OUTPATIENT
Start: 2018-11-09 | End: 2018-11-09

## 2018-11-09 RX ORDER — LABETALOL HYDROCHLORIDE 5 MG/ML
5 INJECTION, SOLUTION INTRAVENOUS EVERY 10 MIN PRN
Status: DISCONTINUED | OUTPATIENT
Start: 2018-11-09 | End: 2018-11-09 | Stop reason: HOSPADM

## 2018-11-09 RX ORDER — MIDAZOLAM HYDROCHLORIDE 1 MG/ML
INJECTION INTRAMUSCULAR; INTRAVENOUS PRN
Status: DISCONTINUED | OUTPATIENT
Start: 2018-11-09 | End: 2018-11-09 | Stop reason: SDUPTHER

## 2018-11-09 RX ORDER — NEOSTIGMINE METHYLSULFATE 1 MG/ML
INJECTION, SOLUTION INTRAVENOUS PRN
Status: DISCONTINUED | OUTPATIENT
Start: 2018-11-09 | End: 2018-11-09 | Stop reason: SDUPTHER

## 2018-11-09 RX ORDER — FENTANYL CITRATE 50 UG/ML
25 INJECTION, SOLUTION INTRAMUSCULAR; INTRAVENOUS EVERY 5 MIN PRN
Status: DISCONTINUED | OUTPATIENT
Start: 2018-11-09 | End: 2018-11-09 | Stop reason: HOSPADM

## 2018-11-09 RX ORDER — LISINOPRIL 10 MG/1
10 TABLET ORAL 2 TIMES DAILY
Status: DISCONTINUED | OUTPATIENT
Start: 2018-11-09 | End: 2018-11-11 | Stop reason: HOSPADM

## 2018-11-09 RX ORDER — CALCIUM CARBONATE 200(500)MG
500 TABLET,CHEWABLE ORAL 3 TIMES DAILY PRN
Status: DISCONTINUED | OUTPATIENT
Start: 2018-11-09 | End: 2018-11-11 | Stop reason: HOSPADM

## 2018-11-09 RX ORDER — METOCLOPRAMIDE HYDROCHLORIDE 5 MG/ML
10 INJECTION INTRAMUSCULAR; INTRAVENOUS
Status: DISCONTINUED | OUTPATIENT
Start: 2018-11-09 | End: 2018-11-09 | Stop reason: HOSPADM

## 2018-11-09 RX ORDER — SODIUM CHLORIDE 0.9 % (FLUSH) 0.9 %
10 SYRINGE (ML) INJECTION EVERY 12 HOURS SCHEDULED
Status: DISCONTINUED | OUTPATIENT
Start: 2018-11-09 | End: 2018-11-11 | Stop reason: HOSPADM

## 2018-11-09 RX ORDER — SUCCINYLCHOLINE CHLORIDE 20 MG/ML
INJECTION INTRAMUSCULAR; INTRAVENOUS PRN
Status: DISCONTINUED | OUTPATIENT
Start: 2018-11-09 | End: 2018-11-09 | Stop reason: SDUPTHER

## 2018-11-09 RX ORDER — ROCURONIUM BROMIDE 10 MG/ML
INJECTION, SOLUTION INTRAVENOUS PRN
Status: DISCONTINUED | OUTPATIENT
Start: 2018-11-09 | End: 2018-11-09 | Stop reason: SDUPTHER

## 2018-11-09 RX ORDER — MAGNESIUM HYDROXIDE/ALUMINUM HYDROXICE/SIMETHICONE 120; 1200; 1200 MG/30ML; MG/30ML; MG/30ML
30 SUSPENSION ORAL EVERY 6 HOURS PRN
Status: DISCONTINUED | OUTPATIENT
Start: 2018-11-09 | End: 2018-11-11 | Stop reason: HOSPADM

## 2018-11-09 RX ORDER — SODIUM CHLORIDE 0.9 % (FLUSH) 0.9 %
10 SYRINGE (ML) INJECTION PRN
Status: DISCONTINUED | OUTPATIENT
Start: 2018-11-09 | End: 2018-11-11 | Stop reason: HOSPADM

## 2018-11-09 RX ORDER — MORPHINE SULFATE 4 MG/ML
4 INJECTION, SOLUTION INTRAMUSCULAR; INTRAVENOUS
Status: DISCONTINUED | OUTPATIENT
Start: 2018-11-09 | End: 2018-11-11 | Stop reason: HOSPADM

## 2018-11-09 RX ORDER — ONDANSETRON 2 MG/ML
INJECTION INTRAMUSCULAR; INTRAVENOUS PRN
Status: DISCONTINUED | OUTPATIENT
Start: 2018-11-09 | End: 2018-11-09 | Stop reason: SDUPTHER

## 2018-11-09 RX ORDER — SODIUM CHLORIDE 9 MG/ML
INJECTION, SOLUTION INTRAVENOUS CONTINUOUS
Status: DISCONTINUED | OUTPATIENT
Start: 2018-11-09 | End: 2018-11-09

## 2018-11-09 RX ORDER — HYDROCODONE BITARTRATE AND ACETAMINOPHEN 5; 325 MG/1; MG/1
1 TABLET ORAL EVERY 4 HOURS PRN
Status: DISCONTINUED | OUTPATIENT
Start: 2018-11-09 | End: 2018-11-11 | Stop reason: HOSPADM

## 2018-11-09 RX ORDER — FLECAINIDE ACETATE 50 MG/1
100 TABLET ORAL 2 TIMES DAILY
Status: DISCONTINUED | OUTPATIENT
Start: 2018-11-09 | End: 2018-11-11 | Stop reason: HOSPADM

## 2018-11-09 RX ORDER — ALLOPURINOL 300 MG/1
300 TABLET ORAL DAILY
Status: DISCONTINUED | OUTPATIENT
Start: 2018-11-09 | End: 2018-11-11 | Stop reason: HOSPADM

## 2018-11-09 RX ORDER — CLONIDINE HYDROCHLORIDE 0.1 MG/1
0.1 TABLET ORAL 2 TIMES DAILY
Status: DISCONTINUED | OUTPATIENT
Start: 2018-11-09 | End: 2018-11-11 | Stop reason: HOSPADM

## 2018-11-09 RX ORDER — ACETAMINOPHEN 325 MG/1
650 TABLET ORAL EVERY 4 HOURS PRN
Status: DISCONTINUED | OUTPATIENT
Start: 2018-11-09 | End: 2018-11-11 | Stop reason: HOSPADM

## 2018-11-09 RX ORDER — MORPHINE SULFATE 2 MG/ML
2 INJECTION, SOLUTION INTRAMUSCULAR; INTRAVENOUS
Status: DISCONTINUED | OUTPATIENT
Start: 2018-11-09 | End: 2018-11-11 | Stop reason: HOSPADM

## 2018-11-09 RX ORDER — DOCUSATE SODIUM 100 MG/1
100 CAPSULE, LIQUID FILLED ORAL 2 TIMES DAILY
Status: DISCONTINUED | OUTPATIENT
Start: 2018-11-09 | End: 2018-11-11 | Stop reason: HOSPADM

## 2018-11-09 RX ORDER — ATROPA BELLADONNA AND OPIUM 16.2; 3 MG/1; MG/1
SUPPOSITORY RECTAL
Status: COMPLETED
Start: 2018-11-09 | End: 2018-11-09

## 2018-11-09 RX ORDER — DEXAMETHASONE SODIUM PHOSPHATE 4 MG/ML
INJECTION, SOLUTION INTRA-ARTICULAR; INTRALESIONAL; INTRAMUSCULAR; INTRAVENOUS; SOFT TISSUE PRN
Status: DISCONTINUED | OUTPATIENT
Start: 2018-11-09 | End: 2018-11-09 | Stop reason: SDUPTHER

## 2018-11-09 RX ORDER — SODIUM CHLORIDE 9 MG/ML
INJECTION, SOLUTION INTRAVENOUS CONTINUOUS PRN
Status: DISCONTINUED | OUTPATIENT
Start: 2018-11-09 | End: 2018-11-09 | Stop reason: SDUPTHER

## 2018-11-09 RX ORDER — AMLODIPINE BESYLATE 5 MG/1
5 TABLET ORAL DAILY
Status: DISCONTINUED | OUTPATIENT
Start: 2018-11-09 | End: 2018-11-11 | Stop reason: HOSPADM

## 2018-11-09 RX ORDER — ATROPA BELLADONNA AND OPIUM 16.2; 6 MG/1; MG/1
60 SUPPOSITORY RECTAL ONCE
Status: DISCONTINUED | OUTPATIENT
Start: 2018-11-09 | End: 2018-11-09 | Stop reason: CLARIF

## 2018-11-09 RX ORDER — KETOROLAC TROMETHAMINE 30 MG/ML
15 INJECTION, SOLUTION INTRAMUSCULAR; INTRAVENOUS EVERY 6 HOURS PRN
Status: DISPENSED | OUTPATIENT
Start: 2018-11-09 | End: 2018-11-11

## 2018-11-09 RX ORDER — FENTANYL CITRATE 50 UG/ML
INJECTION, SOLUTION INTRAMUSCULAR; INTRAVENOUS PRN
Status: DISCONTINUED | OUTPATIENT
Start: 2018-11-09 | End: 2018-11-09 | Stop reason: SDUPTHER

## 2018-11-09 RX ORDER — HYDROCODONE BITARTRATE AND ACETAMINOPHEN 5; 325 MG/1; MG/1
2 TABLET ORAL EVERY 4 HOURS PRN
Status: DISCONTINUED | OUTPATIENT
Start: 2018-11-09 | End: 2018-11-11 | Stop reason: HOSPADM

## 2018-11-09 RX ORDER — GLYCOPYRROLATE 1 MG/5 ML
SYRINGE (ML) INTRAVENOUS PRN
Status: DISCONTINUED | OUTPATIENT
Start: 2018-11-09 | End: 2018-11-09 | Stop reason: SDUPTHER

## 2018-11-09 RX ORDER — SODIUM CHLORIDE 9 MG/ML
INJECTION, SOLUTION INTRAVENOUS CONTINUOUS
Status: DISCONTINUED | OUTPATIENT
Start: 2018-11-09 | End: 2018-11-11

## 2018-11-09 RX ORDER — ZOLPIDEM TARTRATE 5 MG/1
5 TABLET ORAL NIGHTLY
Status: DISCONTINUED | OUTPATIENT
Start: 2018-11-09 | End: 2018-11-11 | Stop reason: HOSPADM

## 2018-11-09 RX ORDER — MEPERIDINE HYDROCHLORIDE 25 MG/ML
12.5 INJECTION INTRAMUSCULAR; INTRAVENOUS; SUBCUTANEOUS EVERY 5 MIN PRN
Status: DISCONTINUED | OUTPATIENT
Start: 2018-11-09 | End: 2018-11-09 | Stop reason: HOSPADM

## 2018-11-09 RX ORDER — ONDANSETRON 2 MG/ML
4 INJECTION INTRAMUSCULAR; INTRAVENOUS EVERY 6 HOURS PRN
Status: DISCONTINUED | OUTPATIENT
Start: 2018-11-09 | End: 2018-11-11 | Stop reason: HOSPADM

## 2018-11-09 RX ORDER — LIDOCAINE HYDROCHLORIDE 20 MG/ML
INJECTION, SOLUTION INFILTRATION; PERINEURAL PRN
Status: DISCONTINUED | OUTPATIENT
Start: 2018-11-09 | End: 2018-11-09 | Stop reason: SDUPTHER

## 2018-11-09 RX ORDER — FAMOTIDINE 20 MG/1
20 TABLET, FILM COATED ORAL 2 TIMES DAILY
Status: DISCONTINUED | OUTPATIENT
Start: 2018-11-09 | End: 2018-11-11 | Stop reason: HOSPADM

## 2018-11-09 RX ORDER — BUPIVACAINE HYDROCHLORIDE AND EPINEPHRINE 5; 5 MG/ML; UG/ML
INJECTION, SOLUTION EPIDURAL; INTRACAUDAL; PERINEURAL PRN
Status: DISCONTINUED | OUTPATIENT
Start: 2018-11-09 | End: 2018-11-09 | Stop reason: HOSPADM

## 2018-11-09 RX ORDER — DIPHENHYDRAMINE HYDROCHLORIDE 50 MG/ML
12.5 INJECTION INTRAMUSCULAR; INTRAVENOUS
Status: DISCONTINUED | OUTPATIENT
Start: 2018-11-09 | End: 2018-11-09 | Stop reason: HOSPADM

## 2018-11-09 RX ORDER — SENNA PLUS 8.6 MG/1
1 TABLET ORAL 2 TIMES DAILY PRN
Status: DISCONTINUED | OUTPATIENT
Start: 2018-11-09 | End: 2018-11-11 | Stop reason: HOSPADM

## 2018-11-09 RX ORDER — IBUPROFEN 200 MG
TABLET ORAL 2 TIMES DAILY
Status: DISCONTINUED | OUTPATIENT
Start: 2018-11-09 | End: 2018-11-10 | Stop reason: SDUPTHER

## 2018-11-09 RX ORDER — ATROPA BELLADONNA AND OPIUM 16.2; 3 MG/1; MG/1
30 SUPPOSITORY RECTAL EVERY 8 HOURS PRN
Status: DISCONTINUED | OUTPATIENT
Start: 2018-11-09 | End: 2018-11-11 | Stop reason: HOSPADM

## 2018-11-09 RX ORDER — PROPOFOL 10 MG/ML
INJECTION, EMULSION INTRAVENOUS PRN
Status: DISCONTINUED | OUTPATIENT
Start: 2018-11-09 | End: 2018-11-09 | Stop reason: SDUPTHER

## 2018-11-09 RX ORDER — NITROGLYCERIN 0.4 MG/1
0.4 TABLET SUBLINGUAL EVERY 5 MIN PRN
Status: DISCONTINUED | OUTPATIENT
Start: 2018-11-09 | End: 2018-11-11 | Stop reason: HOSPADM

## 2018-11-09 RX ORDER — FENTANYL CITRATE 50 UG/ML
50 INJECTION, SOLUTION INTRAMUSCULAR; INTRAVENOUS EVERY 5 MIN PRN
Status: DISCONTINUED | OUTPATIENT
Start: 2018-11-09 | End: 2018-11-09 | Stop reason: HOSPADM

## 2018-11-09 RX ADMIN — SODIUM CHLORIDE: 9 INJECTION, SOLUTION INTRAVENOUS at 07:47

## 2018-11-09 RX ADMIN — FENTANYL CITRATE 50 MCG: 50 INJECTION INTRAMUSCULAR; INTRAVENOUS at 11:00

## 2018-11-09 RX ADMIN — FENTANYL CITRATE 50 MCG: 50 INJECTION INTRAMUSCULAR; INTRAVENOUS at 07:54

## 2018-11-09 RX ADMIN — CEFOXITIN SODIUM 2 G: 2 POWDER, FOR SOLUTION INTRAVENOUS at 21:31

## 2018-11-09 RX ADMIN — SODIUM CHLORIDE: 9 INJECTION, SOLUTION INTRAVENOUS at 22:04

## 2018-11-09 RX ADMIN — SODIUM CHLORIDE: 9 INJECTION, SOLUTION INTRAVENOUS at 12:11

## 2018-11-09 RX ADMIN — KETOROLAC TROMETHAMINE 30 MG: 30 INJECTION, SOLUTION INTRAMUSCULAR at 11:00

## 2018-11-09 RX ADMIN — FENTANYL CITRATE 50 MCG: 50 INJECTION INTRAMUSCULAR; INTRAVENOUS at 10:25

## 2018-11-09 RX ADMIN — MIDAZOLAM HYDROCHLORIDE 2 MG: 1 INJECTION, SOLUTION INTRAMUSCULAR; INTRAVENOUS at 07:47

## 2018-11-09 RX ADMIN — ROCURONIUM BROMIDE 20 MG: 10 INJECTION INTRAVENOUS at 08:40

## 2018-11-09 RX ADMIN — ATROPA BELLADONNA AND OPIUM 30 MG: 16.2; 3 SUPPOSITORY RECTAL at 11:00

## 2018-11-09 RX ADMIN — ANTACID TABLETS 500 MG: 500 TABLET, CHEWABLE ORAL at 21:32

## 2018-11-09 RX ADMIN — LIDOCAINE HYDROCHLORIDE 100 MG: 20 INJECTION, SOLUTION INFILTRATION; PERINEURAL at 07:52

## 2018-11-09 RX ADMIN — Medication 10 ML: at 22:03

## 2018-11-09 RX ADMIN — DOCUSATE SODIUM 100 MG: 100 CAPSULE, LIQUID FILLED ORAL at 22:01

## 2018-11-09 RX ADMIN — DEXAMETHASONE SODIUM PHOSPHATE 8 MG: 4 INJECTION, SOLUTION INTRAMUSCULAR; INTRAVENOUS at 08:54

## 2018-11-09 RX ADMIN — ROCURONIUM BROMIDE 45 MG: 10 INJECTION INTRAVENOUS at 07:59

## 2018-11-09 RX ADMIN — SUCCINYLCHOLINE CHLORIDE 120 MG: 20 INJECTION, SOLUTION INTRAMUSCULAR; INTRAVENOUS at 07:52

## 2018-11-09 RX ADMIN — ALLOPURINOL 300 MG: 300 TABLET ORAL at 21:33

## 2018-11-09 RX ADMIN — NEOSTIGMINE METHYLSULFATE 4 MG: 1 INJECTION, SOLUTION INTRAVENOUS at 10:20

## 2018-11-09 RX ADMIN — FLECAINIDE ACETATE 100 MG: 50 TABLET ORAL at 22:01

## 2018-11-09 RX ADMIN — SODIUM CHLORIDE: 9 INJECTION, SOLUTION INTRAVENOUS at 07:57

## 2018-11-09 RX ADMIN — ONDANSETRON HYDROCHLORIDE 4 MG: 4 INJECTION, SOLUTION INTRAMUSCULAR; INTRAVENOUS at 08:55

## 2018-11-09 RX ADMIN — BACITRACIN, NEOMYCIN, POLYMYXIN B 1 G: 400; 3.5; 5 OINTMENT TOPICAL at 15:34

## 2018-11-09 RX ADMIN — FENTANYL CITRATE 50 MCG: 50 INJECTION INTRAMUSCULAR; INTRAVENOUS at 10:55

## 2018-11-09 RX ADMIN — FAMOTIDINE 20 MG: 20 TABLET ORAL at 21:31

## 2018-11-09 RX ADMIN — CEFOXITIN SODIUM 2 G: 2 POWDER, FOR SOLUTION INTRAVENOUS at 14:58

## 2018-11-09 RX ADMIN — BACITRACIN, NEOMYCIN, POLYMYXIN B: 400; 3.5; 5 OINTMENT TOPICAL at 21:32

## 2018-11-09 RX ADMIN — ROCURONIUM BROMIDE 5 MG: 10 INJECTION INTRAVENOUS at 07:52

## 2018-11-09 RX ADMIN — ROCURONIUM BROMIDE 10 MG: 10 INJECTION INTRAVENOUS at 09:23

## 2018-11-09 RX ADMIN — SODIUM CHLORIDE: 9 INJECTION, SOLUTION INTRAVENOUS at 07:27

## 2018-11-09 RX ADMIN — CEFOXITIN 2 G: 2 INJECTION, POWDER, FOR SOLUTION INTRAVENOUS at 08:03

## 2018-11-09 RX ADMIN — Medication 0.6 MG: at 10:20

## 2018-11-09 RX ADMIN — PROPOFOL 200 MG: 10 INJECTION, EMULSION INTRAVENOUS at 07:52

## 2018-11-09 RX ADMIN — FENTANYL CITRATE 50 MCG: 50 INJECTION INTRAMUSCULAR; INTRAVENOUS at 09:00

## 2018-11-09 RX ADMIN — FENTANYL CITRATE 50 MCG: 50 INJECTION INTRAMUSCULAR; INTRAVENOUS at 08:24

## 2018-11-09 ASSESSMENT — PULMONARY FUNCTION TESTS
PIF_VALUE: 23
PIF_VALUE: 23
PIF_VALUE: 14
PIF_VALUE: 13
PIF_VALUE: 24
PIF_VALUE: 20
PIF_VALUE: 24
PIF_VALUE: 23
PIF_VALUE: 15
PIF_VALUE: 19
PIF_VALUE: 19
PIF_VALUE: 24
PIF_VALUE: 23
PIF_VALUE: 14
PIF_VALUE: 20
PIF_VALUE: 15
PIF_VALUE: 13
PIF_VALUE: 15
PIF_VALUE: 14
PIF_VALUE: 13
PIF_VALUE: 23
PIF_VALUE: 15
PIF_VALUE: 15
PIF_VALUE: 24
PIF_VALUE: 23
PIF_VALUE: 24
PIF_VALUE: 23
PIF_VALUE: 24
PIF_VALUE: 1
PIF_VALUE: 23
PIF_VALUE: 19
PIF_VALUE: 19
PIF_VALUE: 23
PIF_VALUE: 15
PIF_VALUE: 23
PIF_VALUE: 15
PIF_VALUE: 23
PIF_VALUE: 24
PIF_VALUE: 23
PIF_VALUE: 15
PIF_VALUE: 15
PIF_VALUE: 12
PIF_VALUE: 24
PIF_VALUE: 23
PIF_VALUE: 24
PIF_VALUE: 23
PIF_VALUE: 23
PIF_VALUE: 17
PIF_VALUE: 13
PIF_VALUE: 24
PIF_VALUE: 23
PIF_VALUE: 14
PIF_VALUE: 13
PIF_VALUE: 2
PIF_VALUE: 24
PIF_VALUE: 23
PIF_VALUE: 24
PIF_VALUE: 23
PIF_VALUE: 19
PIF_VALUE: 23
PIF_VALUE: 15
PIF_VALUE: 23
PIF_VALUE: 2
PIF_VALUE: 23
PIF_VALUE: 24
PIF_VALUE: 24
PIF_VALUE: 19
PIF_VALUE: 18
PIF_VALUE: 23
PIF_VALUE: 15
PIF_VALUE: 23
PIF_VALUE: 15
PIF_VALUE: 24
PIF_VALUE: 23
PIF_VALUE: 16
PIF_VALUE: 23
PIF_VALUE: 23
PIF_VALUE: 19
PIF_VALUE: 11
PIF_VALUE: 13
PIF_VALUE: 15
PIF_VALUE: 23
PIF_VALUE: 23
PIF_VALUE: 20
PIF_VALUE: 1
PIF_VALUE: 23
PIF_VALUE: 16
PIF_VALUE: 13
PIF_VALUE: 13
PIF_VALUE: 23
PIF_VALUE: 23
PIF_VALUE: 12
PIF_VALUE: 23
PIF_VALUE: 24
PIF_VALUE: 19
PIF_VALUE: 23
PIF_VALUE: 24
PIF_VALUE: 23
PIF_VALUE: 15
PIF_VALUE: 20
PIF_VALUE: 23
PIF_VALUE: 24
PIF_VALUE: 15
PIF_VALUE: 13
PIF_VALUE: 23
PIF_VALUE: 14
PIF_VALUE: 24
PIF_VALUE: 23
PIF_VALUE: 23
PIF_VALUE: 19
PIF_VALUE: 23
PIF_VALUE: 19
PIF_VALUE: 20
PIF_VALUE: 23
PIF_VALUE: 23
PIF_VALUE: 17
PIF_VALUE: 23
PIF_VALUE: 24
PIF_VALUE: 23
PIF_VALUE: 2
PIF_VALUE: 23
PIF_VALUE: 24
PIF_VALUE: 23
PIF_VALUE: 13
PIF_VALUE: 18
PIF_VALUE: 12
PIF_VALUE: 13
PIF_VALUE: 23
PIF_VALUE: 13
PIF_VALUE: 23
PIF_VALUE: 19
PIF_VALUE: 24
PIF_VALUE: 23
PIF_VALUE: 25
PIF_VALUE: 13
PIF_VALUE: 23
PIF_VALUE: 23
PIF_VALUE: 20
PIF_VALUE: 15
PIF_VALUE: 19
PIF_VALUE: 23
PIF_VALUE: 23
PIF_VALUE: 5
PIF_VALUE: 13
PIF_VALUE: 14
PIF_VALUE: 15
PIF_VALUE: 24
PIF_VALUE: 21
PIF_VALUE: 23
PIF_VALUE: 24
PIF_VALUE: 20
PIF_VALUE: 24
PIF_VALUE: 15
PIF_VALUE: 23
PIF_VALUE: 23
PIF_VALUE: 11
PIF_VALUE: 15
PIF_VALUE: 24
PIF_VALUE: 23
PIF_VALUE: 16
PIF_VALUE: 23

## 2018-11-09 ASSESSMENT — PAIN SCALES - GENERAL
PAINLEVEL_OUTOF10: 8
PAINLEVEL_OUTOF10: 2
PAINLEVEL_OUTOF10: 8
PAINLEVEL_OUTOF10: 0
PAINLEVEL_OUTOF10: 2
PAINLEVEL_OUTOF10: 2
PAINLEVEL_OUTOF10: 0

## 2018-11-09 ASSESSMENT — PAIN DESCRIPTION - LOCATION: LOCATION: ABDOMEN

## 2018-11-09 ASSESSMENT — PAIN DESCRIPTION - FREQUENCY: FREQUENCY: CONTINUOUS

## 2018-11-09 ASSESSMENT — PAIN DESCRIPTION - ONSET: ONSET: ON-GOING

## 2018-11-09 ASSESSMENT — PAIN DESCRIPTION - DESCRIPTORS: DESCRIPTORS: OTHER (COMMENT)

## 2018-11-09 ASSESSMENT — PAIN DESCRIPTION - PAIN TYPE: TYPE: SURGICAL PAIN

## 2018-11-09 NOTE — ANESTHESIA PRE PROCEDURE
Department of Anesthesiology  Preprocedure Note       Name:  Verna Baeza   Age:  76 y.o.  :  1950                                          MRN:  [id number]         Date:  2018      Surgeon: David Dodge):  Ophelia Osuna MD    Procedure: ROBOTIC RADICAL PROSTATECTOMY WITH PELVIC LYMPH NODES DISSECTION (N/A Abdomen)    Medications prior to admission:   Prior to Admission medications    Medication Sig Start Date End Date Taking? Authorizing Provider   lisinopril (PRINIVIL;ZESTRIL) 10 MG tablet Patient states he Takes 1 pill in the am and 1 and 1/2 pills in the PM 18  Yes Sarah Yuan MD   zolpidem (AMBIEN) 5 MG tablet Patient states that he takes 1.5 pills every Night. 18 Yes Sarah Yuan MD   Multiple Vitamin (MULTI-VITAMIN) TABS Take 1 tablet by mouth   Yes Historical Provider, MD   allopurinol (ZYLOPRIM) 300 MG tablet Take 1 tablet by mouth daily  Patient taking differently: Take 300 mg by mouth daily Takes approximately 200mg-3/4 of a tablet 18  Yes Sarah Yuan MD   amLODIPine (NORVASC) 5 MG tablet Take 1 tablet by mouth daily 18  Yes Sarah Yuan MD   cloNIDine (CATAPRES) 0.1 MG tablet Take 1 tablet by mouth 2 times daily 18  Yes Sarah Yuan MD   flecainide [facility]) 100 MG tablet Take 1 tablet by mouth 2 times daily 18  Yes Sarah Yuan MD   rosuvastatin (CRESTOR) 10 MG tablet Take 1 tablet by mouth daily 18  Yes Sarah Yuan MD   tamsulosin [facility]) 0.4 MG capsule Take 1 capsule by mouth nightly 18   Ophelia Osuna MD   Respiratory Therapy Supplies (ADULT MASK) GERALD CPAP mask and tubing.  18   Sarah Yuan MD   nitroGLYCERIN (NITROSTAT) 0.4 MG SL tablet Place 0.4 mg under the tongue 3/20/18   Historical Provider, MD   furosemide (LASIX) 20 MG tablet Take 1 tablet by mouth daily  Patient taking differently: Take 20 mg by mouth daily Taking twice a week 18   Sarah Yuan MD   meloxicam (MOBIC) 7.5 MG tablet Take 1 tablet by mouth 2 times daily 6/1/18   Leander Kirk MD       Current medications:    Current Facility-Administered Medications   Medication Dose Route Frequency Provider Last Rate Last Dose    0.9 % sodium chloride infusion   Intravenous Continuous Thuy Nunn  mL/hr at 62/63/44 0727      cefOXitin (MEFOXIN) 2 g in dextrose 5% 50 mL (mini-bag)  2 g Intravenous 30 Min Pre-Op Thuy Nunn LPN           Allergies:     Allergies   Allergen Reactions    Sulfa Antibiotics Other (See Comments)    Prochlorperazine Edisylate Other (See Comments)     Lose control       Problem List:    Patient Active Problem List   Diagnosis Code    Need for prophylactic vaccination and inoculation against varicella Z23    Benign essential HTN I10    Frequent PVCs I49.3    Primary insomnia F51.01    Prostate cancer ([facility] [address] C61       Past Medical History:        Diagnosis Date    Gout     Hypertension     Osteoarthritis     Sleep apnea        Past Surgical History:        Procedure Laterality Date    HAND SURGERY      HIP SURGERY  2012       Social History:    Social History   Substance Use Topics    Smoking status: Never Smoker    Smokeless tobacco: Never Used    Alcohol use 1.2 oz/week     2 Cans of beer per week                                Counseling given: Not Answered      Vital Signs (Current):   Vitals:    11/09/18 0617 11/09/18 0650   BP: (!) 140/78    Pulse: 66    Resp: 16    Temp: 98.5 °F (36.9 °C)    TempSrc: Temporal    SpO2: 95%    Weight:  216 lb (98 kg)   Height:  5' 7.5\" (1.715 m)                                              BP Readings from Last 3 Encounters:   11/09/18 (!) 140/78   11/09/18 126/74   10/29/18 124/66       NPO Status: Time of last liquid consumption: 0500                        Time of last solid consumption: 1000                        Date of last liquid consumption: 11/09/18                        Date of last solid food consumption: 11/08/18    BMI:   Wt Readings

## 2018-11-09 NOTE — ANESTHESIA POSTPROCEDURE EVALUATION
Department of Anesthesiology  Postprocedure Note    Patient: Akash Sethi  MRN: [id number]  YOB: 1950  Date of evaluation: 11/9/2018  Time:  4:21 PM     Procedure Summary     Date:  11/09/18 Room / Location:  [address] [place] [address]    Anesthesia Start:  6470 Anesthesia Stop:  2654    Procedure:  ROBOTIC RADICAL PROSTATECTOMY WITH PELVIC LYMPH NODES DISSECTION (N/A Abdomen) Diagnosis:  (prostate cancer)    Surgeon:  Lore Lara MD Responsible Provider:  Harshal Thomas MD    Anesthesia Type:  General ASA Status:  2          Anesthesia Type: General    Maggy Phase I: Maggy Score: 9    Maggy Phase II:      Last vitals: Reviewed and per EMR flowsheets. Anesthesia Post Evaluation    Patient location during evaluation: PACU  Patient participation: complete - patient participated  Level of consciousness: awake and alert  Airway patency: patent  Nausea & Vomiting: no nausea and no vomiting  Complications: no  Cardiovascular status: hemodynamically stable  Respiratory status: acceptable  Hydration status: euvolemic      [address]  POST-ANESTHESIA NOTE       Name:  Akash Sethi                                         Age:  76 y.o.   MRN:  [id number]      Last Vitals:  /67   Pulse 67   Temp 97.9 °F (36.6 °C) (Oral)   Resp 16   Ht 5' 7.5\" (1.715 m)   Wt 216 lb (98 kg)   SpO2 94%   BMI 33.33 kg/m²   Patient Vitals for the past 4 hrs:   BP Temp Temp src Pulse Resp SpO2   11/09/18 1606 117/67 97.9 °F (36.6 °C) Oral 67 16 94 %   11/09/18 1309 113/62 - - 66 16 97 %       Level of Consciousness:  Awake    Respiratory:  Stable    Oxygen Saturation:  Stable    Cardiovascular:  Stable    Hydration:  Adequate    PONV:  Stable    Post-op Pain:  Adequate analgesia    Post-op Assessment:  No apparent anesthetic complications    Additional Follow-Up / Treatment / Comment:  None    Te Hassan MD  November 9, 2018   4:21 PM

## 2018-11-10 LAB
ANION GAP SERPL CALCULATED.3IONS-SCNC: 12 MEQ/L (ref 8–16)
BASOPHILS # BLD: 0.2 %
BASOPHILS ABSOLUTE: 0 THOU/MM3 (ref 0–0.1)
BUN BLDV-MCNC: 10 MG/DL (ref 7–22)
CALCIUM SERPL-MCNC: 8.5 MG/DL (ref 8.5–10.5)
CHLORIDE BLD-SCNC: 105 MEQ/L (ref 98–111)
CO2: 23 MEQ/L (ref 23–33)
CREAT SERPL-MCNC: 1 MG/DL (ref 0.4–1.2)
EOSINOPHIL # BLD: 0 %
EOSINOPHILS ABSOLUTE: 0 THOU/MM3 (ref 0–0.4)
ERYTHROCYTE [DISTWIDTH] IN BLOOD BY AUTOMATED COUNT: 13.6 % (ref 11.5–14.5)
ERYTHROCYTE [DISTWIDTH] IN BLOOD BY AUTOMATED COUNT: 47.5 FL (ref 35–45)
GFR SERPL CREATININE-BSD FRML MDRD: 74 ML/MIN/1.73M2
GLUCOSE BLD-MCNC: 116 MG/DL (ref 70–108)
HCT VFR BLD CALC: 32.5 % (ref 42–52)
HCT VFR BLD CALC: 33.7 % (ref 42–52)
HEMOGLOBIN: 10.7 GM/DL (ref 14–18)
HEMOGLOBIN: 11.1 GM/DL (ref 14–18)
IMMATURE GRANS (ABS): 0.11 THOU/MM3 (ref 0–0.07)
IMMATURE GRANULOCYTES: 0.8 %
LYMPHOCYTES # BLD: 8.2 %
LYMPHOCYTES ABSOLUTE: 1.1 THOU/MM3 (ref 1–4.8)
MCH RBC QN AUTO: 31.5 PG (ref 26–33)
MCHC RBC AUTO-ENTMCNC: 32.9 GM/DL (ref 32.2–35.5)
MCV RBC AUTO: 95.6 FL (ref 80–94)
MONOCYTES # BLD: 10.6 %
MONOCYTES ABSOLUTE: 1.4 THOU/MM3 (ref 0.4–1.3)
NUCLEATED RED BLOOD CELLS: 0 /100 WBC
PLATELET # BLD: 235 THOU/MM3 (ref 130–400)
PMV BLD AUTO: 9.3 FL (ref 9.4–12.4)
POTASSIUM SERPL-SCNC: 4.5 MEQ/L (ref 3.5–5.2)
RBC # BLD: 3.4 MILL/MM3 (ref 4.7–6.1)
SEG NEUTROPHILS: 80.2 %
SEGMENTED NEUTROPHILS ABSOLUTE COUNT: 10.6 THOU/MM3 (ref 1.8–7.7)
SODIUM BLD-SCNC: 140 MEQ/L (ref 135–145)
WBC # BLD: 13.2 THOU/MM3 (ref 4.8–10.8)

## 2018-11-10 PROCEDURE — 99024 POSTOP FOLLOW-UP VISIT: CPT | Performed by: NURSE PRACTITIONER

## 2018-11-10 PROCEDURE — 36415 COLL VENOUS BLD VENIPUNCTURE: CPT

## 2018-11-10 PROCEDURE — 1200000000 HC SEMI PRIVATE

## 2018-11-10 PROCEDURE — 2580000003 HC RX 258: Performed by: PHYSICIAN ASSISTANT

## 2018-11-10 PROCEDURE — 2709999900 HC NON-CHARGEABLE SUPPLY

## 2018-11-10 PROCEDURE — 6370000000 HC RX 637 (ALT 250 FOR IP): Performed by: PHYSICIAN ASSISTANT

## 2018-11-10 PROCEDURE — 85014 HEMATOCRIT: CPT

## 2018-11-10 PROCEDURE — 85018 HEMOGLOBIN: CPT

## 2018-11-10 PROCEDURE — 85025 COMPLETE CBC W/AUTO DIFF WBC: CPT

## 2018-11-10 PROCEDURE — 6360000002 HC RX W HCPCS: Performed by: UROLOGY

## 2018-11-10 PROCEDURE — 80048 BASIC METABOLIC PNL TOTAL CA: CPT

## 2018-11-10 PROCEDURE — 6370000000 HC RX 637 (ALT 250 FOR IP): Performed by: NURSE PRACTITIONER

## 2018-11-10 PROCEDURE — 6360000002 HC RX W HCPCS: Performed by: PHYSICIAN ASSISTANT

## 2018-11-10 RX ORDER — HYDROCODONE BITARTRATE AND ACETAMINOPHEN 5; 325 MG/1; MG/1
1 TABLET ORAL EVERY 4 HOURS PRN
Qty: 12 TABLET | Refills: 0 | Status: SHIPPED | OUTPATIENT
Start: 2018-11-10 | End: 2018-11-17

## 2018-11-10 RX ORDER — IBUPROFEN 200 MG
TABLET ORAL 2 TIMES DAILY
Status: DISCONTINUED | OUTPATIENT
Start: 2018-11-10 | End: 2018-11-11 | Stop reason: HOSPADM

## 2018-11-10 RX ORDER — BISACODYL 10 MG
10 SUPPOSITORY, RECTAL RECTAL 2 TIMES DAILY PRN
Status: DISCONTINUED | OUTPATIENT
Start: 2018-11-10 | End: 2018-11-11 | Stop reason: HOSPADM

## 2018-11-10 RX ORDER — SENNA PLUS 8.6 MG/1
1 TABLET ORAL 2 TIMES DAILY PRN
Qty: 30 TABLET | Refills: 0 | Status: SHIPPED | OUTPATIENT
Start: 2018-11-10 | End: 2018-12-10

## 2018-11-10 RX ORDER — SIMETHICONE 80 MG
80 TABLET,CHEWABLE ORAL EVERY 6 HOURS PRN
Status: DISCONTINUED | OUTPATIENT
Start: 2018-11-10 | End: 2018-11-11 | Stop reason: HOSPADM

## 2018-11-10 RX ADMIN — BACITRACIN, NEOMYCIN, POLYMYXIN B: 400; 3.5; 5 OINTMENT TOPICAL at 20:23

## 2018-11-10 RX ADMIN — DOCUSATE SODIUM 100 MG: 100 CAPSULE, LIQUID FILLED ORAL at 20:21

## 2018-11-10 RX ADMIN — ANTACID TABLETS 500 MG: 500 TABLET, CHEWABLE ORAL at 13:04

## 2018-11-10 RX ADMIN — FLECAINIDE ACETATE 100 MG: 50 TABLET ORAL at 20:21

## 2018-11-10 RX ADMIN — Medication 10 ML: at 12:14

## 2018-11-10 RX ADMIN — BACITRACIN, NEOMYCIN, POLYMYXIN B: 400; 3.5; 5 OINTMENT TOPICAL at 09:14

## 2018-11-10 RX ADMIN — FAMOTIDINE 20 MG: 20 TABLET ORAL at 20:22

## 2018-11-10 RX ADMIN — BISACODYL 10 MG: 10 SUPPOSITORY RECTAL at 13:05

## 2018-11-10 RX ADMIN — ALLOPURINOL 300 MG: 300 TABLET ORAL at 09:15

## 2018-11-10 RX ADMIN — ZOLPIDEM TARTRATE 5 MG: 5 TABLET ORAL at 01:18

## 2018-11-10 RX ADMIN — KETOROLAC TROMETHAMINE 15 MG: 30 INJECTION, SOLUTION INTRAMUSCULAR at 20:22

## 2018-11-10 RX ADMIN — SIMETHICONE CHEW TAB 80 MG 80 MG: 80 TABLET ORAL at 11:08

## 2018-11-10 RX ADMIN — ACETAMINOPHEN 650 MG: 325 TABLET ORAL at 11:12

## 2018-11-10 RX ADMIN — FLECAINIDE ACETATE 100 MG: 50 TABLET ORAL at 09:15

## 2018-11-10 RX ADMIN — SODIUM CHLORIDE: 9 INJECTION, SOLUTION INTRAVENOUS at 20:23

## 2018-11-10 RX ADMIN — SODIUM CHLORIDE: 9 INJECTION, SOLUTION INTRAVENOUS at 10:00

## 2018-11-10 RX ADMIN — SENNOSIDES 8.6 MG: 8.6 TABLET, FILM COATED ORAL at 03:43

## 2018-11-10 RX ADMIN — DOCUSATE SODIUM 100 MG: 100 CAPSULE, LIQUID FILLED ORAL at 09:14

## 2018-11-10 RX ADMIN — SIMETHICONE CHEW TAB 80 MG 80 MG: 80 TABLET ORAL at 20:21

## 2018-11-10 RX ADMIN — MORPHINE SULFATE 2 MG: 2 INJECTION, SOLUTION INTRAMUSCULAR; INTRAVENOUS at 21:55

## 2018-11-10 RX ADMIN — FAMOTIDINE 20 MG: 20 TABLET ORAL at 09:14

## 2018-11-10 RX ADMIN — ALUMINUM HYDROXIDE, MAGNESIUM HYDROXIDE, AND SIMETHICONE 30 ML: 200; 200; 20 SUSPENSION ORAL at 03:43

## 2018-11-10 RX ADMIN — MORPHINE SULFATE 2 MG: 2 INJECTION, SOLUTION INTRAMUSCULAR; INTRAVENOUS at 14:39

## 2018-11-10 RX ADMIN — MAGNESIUM HYDROXIDE 30 ML: 400 SUSPENSION ORAL at 09:18

## 2018-11-10 ASSESSMENT — PAIN SCALES - GENERAL
PAINLEVEL_OUTOF10: 2
PAINLEVEL_OUTOF10: 4
PAINLEVEL_OUTOF10: 4
PAINLEVEL_OUTOF10: 1
PAINLEVEL_OUTOF10: 3
PAINLEVEL_OUTOF10: 2

## 2018-11-10 ASSESSMENT — PAIN DESCRIPTION - LOCATION
LOCATION: ABDOMEN

## 2018-11-10 ASSESSMENT — PAIN DESCRIPTION - PAIN TYPE
TYPE: ACUTE PAIN
TYPE: ACUTE PAIN;SURGICAL PAIN
TYPE: ACUTE PAIN

## 2018-11-10 ASSESSMENT — PAIN DESCRIPTION - FREQUENCY: FREQUENCY: CONTINUOUS

## 2018-11-10 ASSESSMENT — PAIN DESCRIPTION - ORIENTATION
ORIENTATION: RIGHT;LEFT
ORIENTATION: RIGHT;LEFT;MID
ORIENTATION: RIGHT;LEFT

## 2018-11-10 ASSESSMENT — PAIN DESCRIPTION - DESCRIPTORS
DESCRIPTORS: ACHING;CRAMPING;DISCOMFORT
DESCRIPTORS: ACHING;CRAMPING
DESCRIPTORS: ACHING;CRAMPING

## 2018-11-11 VITALS
BODY MASS INDEX: 32.74 KG/M2 | TEMPERATURE: 99.5 F | DIASTOLIC BLOOD PRESSURE: 71 MMHG | RESPIRATION RATE: 16 BRPM | HEART RATE: 73 BPM | SYSTOLIC BLOOD PRESSURE: 140 MMHG | HEIGHT: 68 IN | WEIGHT: 216 LBS | OXYGEN SATURATION: 93 %

## 2018-11-11 LAB
ANION GAP SERPL CALCULATED.3IONS-SCNC: 11 MEQ/L (ref 8–16)
BASOPHILS # BLD: 0.6 %
BASOPHILS ABSOLUTE: 0 THOU/MM3 (ref 0–0.1)
BUN BLDV-MCNC: 10 MG/DL (ref 7–22)
CALCIUM SERPL-MCNC: 8.4 MG/DL (ref 8.5–10.5)
CHLORIDE BLD-SCNC: 106 MEQ/L (ref 98–111)
CO2: 23 MEQ/L (ref 23–33)
CREAT SERPL-MCNC: 0.9 MG/DL (ref 0.4–1.2)
EOSINOPHIL # BLD: 0.7 %
EOSINOPHILS ABSOLUTE: 0.1 THOU/MM3 (ref 0–0.4)
ERYTHROCYTE [DISTWIDTH] IN BLOOD BY AUTOMATED COUNT: 13.7 % (ref 11.5–14.5)
ERYTHROCYTE [DISTWIDTH] IN BLOOD BY AUTOMATED COUNT: 48.4 FL (ref 35–45)
GFR SERPL CREATININE-BSD FRML MDRD: 84 ML/MIN/1.73M2
GLUCOSE BLD-MCNC: 97 MG/DL (ref 70–108)
HCT VFR BLD CALC: 30.3 % (ref 42–52)
HEMOGLOBIN: 9.9 GM/DL (ref 14–18)
IMMATURE GRANS (ABS): 0.03 THOU/MM3 (ref 0–0.07)
IMMATURE GRANULOCYTES: 0.4 %
LYMPHOCYTES # BLD: 14 %
LYMPHOCYTES ABSOLUTE: 1.1 THOU/MM3 (ref 1–4.8)
MCH RBC QN AUTO: 31.6 PG (ref 26–33)
MCHC RBC AUTO-ENTMCNC: 32.7 GM/DL (ref 32.2–35.5)
MCV RBC AUTO: 96.8 FL (ref 80–94)
MONOCYTES # BLD: 11.1 %
MONOCYTES ABSOLUTE: 0.9 THOU/MM3 (ref 0.4–1.3)
NUCLEATED RED BLOOD CELLS: 0 /100 WBC
PLATELET # BLD: 206 THOU/MM3 (ref 130–400)
PMV BLD AUTO: 9.4 FL (ref 9.4–12.4)
POTASSIUM SERPL-SCNC: 4.1 MEQ/L (ref 3.5–5.2)
RBC # BLD: 3.13 MILL/MM3 (ref 4.7–6.1)
SEG NEUTROPHILS: 73.2 %
SEGMENTED NEUTROPHILS ABSOLUTE COUNT: 6 THOU/MM3 (ref 1.8–7.7)
SODIUM BLD-SCNC: 140 MEQ/L (ref 135–145)
WBC # BLD: 8.2 THOU/MM3 (ref 4.8–10.8)

## 2018-11-11 PROCEDURE — 2709999900 HC NON-CHARGEABLE SUPPLY

## 2018-11-11 PROCEDURE — 80048 BASIC METABOLIC PNL TOTAL CA: CPT

## 2018-11-11 PROCEDURE — 99024 POSTOP FOLLOW-UP VISIT: CPT | Performed by: NURSE PRACTITIONER

## 2018-11-11 PROCEDURE — 2580000003 HC RX 258: Performed by: PHYSICIAN ASSISTANT

## 2018-11-11 PROCEDURE — 85025 COMPLETE CBC W/AUTO DIFF WBC: CPT

## 2018-11-11 PROCEDURE — 6370000000 HC RX 637 (ALT 250 FOR IP): Performed by: PHYSICIAN ASSISTANT

## 2018-11-11 PROCEDURE — 36415 COLL VENOUS BLD VENIPUNCTURE: CPT

## 2018-11-11 PROCEDURE — 6370000000 HC RX 637 (ALT 250 FOR IP): Performed by: NURSE PRACTITIONER

## 2018-11-11 RX ORDER — CIPROFLOXACIN 500 MG/1
500 TABLET, FILM COATED ORAL 2 TIMES DAILY
Qty: 12 TABLET | Refills: 0 | Status: ON HOLD | OUTPATIENT
Start: 2018-11-11 | End: 2018-11-17 | Stop reason: HOSPADM

## 2018-11-11 RX ADMIN — SODIUM CHLORIDE: 9 INJECTION, SOLUTION INTRAVENOUS at 06:36

## 2018-11-11 RX ADMIN — SIMETHICONE CHEW TAB 80 MG 80 MG: 80 TABLET ORAL at 08:33

## 2018-11-11 RX ADMIN — LISINOPRIL 10 MG: 10 TABLET ORAL at 08:27

## 2018-11-11 RX ADMIN — ZOLPIDEM TARTRATE 5 MG: 5 TABLET ORAL at 00:12

## 2018-11-11 RX ADMIN — BACITRACIN, NEOMYCIN, POLYMYXIN B: 400; 3.5; 5 OINTMENT TOPICAL at 08:58

## 2018-11-11 RX ADMIN — FLECAINIDE ACETATE 100 MG: 50 TABLET ORAL at 08:27

## 2018-11-11 RX ADMIN — HYDROCODONE BITARTRATE AND ACETAMINOPHEN 1 TABLET: 5; 325 TABLET ORAL at 03:38

## 2018-11-11 RX ADMIN — DOCUSATE SODIUM 100 MG: 100 CAPSULE, LIQUID FILLED ORAL at 08:33

## 2018-11-11 ASSESSMENT — PAIN DESCRIPTION - PAIN TYPE
TYPE: SURGICAL PAIN

## 2018-11-11 ASSESSMENT — PAIN DESCRIPTION - LOCATION
LOCATION: ABDOMEN

## 2018-11-11 ASSESSMENT — PAIN SCALES - GENERAL
PAINLEVEL_OUTOF10: 2
PAINLEVEL_OUTOF10: 4
PAINLEVEL_OUTOF10: 2

## 2018-11-11 ASSESSMENT — PAIN DESCRIPTION - DESCRIPTORS
DESCRIPTORS: ACHING
DESCRIPTORS: ACHING

## 2018-11-11 NOTE — PROGRESS NOTES
Discharge teaching and instructions for diagnosis/procedure of nathalia prostectomy and how to care for incision paper  And ellington to leg bag completed with patient using teachback method. AVS reviewed. Printed prescriptions given to patient. Patient voiced understanding regarding prescriptions, follow up appointments, and care of self at home.  Discharged in a wheelchair to  home with support per family

## 2018-11-11 NOTE — DISCHARGE SUMMARY
nose is normal  Ears: The ears appear normal to external inspection. Cardiovascular:       Normal rate, regular rhythm. Pulmonary/Chest:  Normal respiratory rate and rhthym. No use of accessory muscles. Lungs clear bilaterally. Abdominal:          Soft. No tenderness. Active bowel sounds. Surgical incisions covered with gauze and tegaderm, no drainage. Genitalia:    Tee catheter draining clear/aníbal urine. Musculoskeletal:    Normal range of motion. He exhibits no edema or tenderness of lower extremities. Extremities:    No cyanosis, clubbing, or edema present. Neurological:    Alert and oriented. Labs:  WBC:    Lab Results   Component Value Date    WBC 8.2 2018     Hemoglobin/Hematocrit:  Lab Results   Component Value Date    HGB 9.9 2018    HCT 30.3 2018     BMP:  Lab Results   Component Value Date     2018    K 4.1 2018     2018    CO2 23 2018    BUN 10 2018    CREATININE 0.9 2018    CALCIUM 8.4 2018    GFRAA >60 10/29/2018    LABGLOM 84 2018       Impression:    Prostate Cancer  Osteoarthritis  Hypertension    Plan:    POD #2  Robot-Assisted Laparoscopic Radical Prostatectomy (RALRP) and bilateral pelvic lymph node dissection and nerve wrap per Dr. Turner Noss. Passing gas and feeling better. Discharge today. Re-check H&H in 2 days. Follow-up with Urology in 1 week. JHONNY Penn  18 9:25 AM  Urology    Patient Identification  Carol Lea is a 76 y.o. male.   :  1950  Admit Date:  2018    Discharge date: 18                                    Disposition: Home    Discharge Diagnoses:   Patient Active Problem List   Diagnosis    Need for prophylactic vaccination and inoculation against varicella    Benign essential HTN    Frequent PVCs    Primary insomnia    Prostate cancer ([facility] [address]       Consults: none    Surgery: Robot-Assisted Laparoscopic Radical Prostatectomy (RALRP)

## 2018-11-12 ENCOUNTER — TELEPHONE (OUTPATIENT)
Dept: UROLOGY | Age: 68
End: 2018-11-12

## 2018-11-12 ENCOUNTER — HOSPITAL ENCOUNTER (OUTPATIENT)
Age: 68
Discharge: HOME | End: 2018-11-12
Payer: COMMERCIAL

## 2018-11-12 ENCOUNTER — CARE COORDINATION (OUTPATIENT)
Dept: CASE MANAGEMENT | Age: 68
End: 2018-11-12

## 2018-11-12 DIAGNOSIS — C61 PROSTATE CANCER (HCC): ICD-10-CM

## 2018-11-12 DIAGNOSIS — T14.8XXA HEMATOMA: Primary | ICD-10-CM

## 2018-11-12 LAB
HCT VFR BLD CALC: 30.9 % (ref 41–53)
HEMOGLOBIN: 10.4 G/DL (ref 13.5–17.5)

## 2018-11-12 PROCEDURE — 85014 HEMATOCRIT: CPT

## 2018-11-12 PROCEDURE — 85018 HEMOGLOBIN: CPT

## 2018-11-12 PROCEDURE — 36415 COLL VENOUS BLD VENIPUNCTURE: CPT

## 2018-11-12 NOTE — TELEPHONE ENCOUNTER
I called Dr. Denzel Pulliam and gave him your message. He is asking that you call him when you want him to start the Lovenox. He said leave a message if you have to on his cell phone. Thank you.

## 2018-11-12 NOTE — CARE COORDINATION
[facility] Transitions Initial Follow Up Call    Call within 2 business days of discharge: Yes    Patient: Fady Floyd Patient : 1950   MRN: [id number]  Reason for Admission: There are no discharge diagnoses documented for the most recent discharge. Discharge Date: 18 RARS: Readmission Risk Score: 9     Spoke with: Sona Hernandez, introduced self/role. Admitted - Robot-Assisted Laparoscopic prostatectomy with Dr. Sincere Cisneros. Patient reports doing well. Having some pressure/pain in abdomen and rectal areas, reports it hurts to sit. Taking tylenol and Norco for pain. Slowly increasing diet as tolerated. Has not had a BM since surgery (3 days), passing gas. Taking Colace, senokot, and will start milk of mag today. Has ellington catheter, draining \"light rigoberto color\". Denies further questions/concerns regarding ellington catheter care. Reports incision looks well, no signs of dehiscence or infection. Understands care of incision. Reviewed all discharge instructions, denies further questions/concerns. Educated on using incentive spirometer to prevent pneumonia. Understands to increase activity as tolerated to prevent DVT's and assist with BM. Patient understands to have H&H drawn today, and wait to take Lovenox until Urology office has cleared. Medications reviewed. Patient declined PCP appointment at this time. Called Urology for appointment on Friday. Patient reports he has had a productive cough with light yellow sputum, office aware, continue to monitor. Understands to call office for any new or worsening symptoms. Denies further needs/concerns/assistance at this time. Patient given [employer] contact information and hours, verbalized understanding. Understands this will be the only call from [facility].      Facility: [address] [place] [address] Transitions 24 Hour Call    Do you have any ongoing symptoms?:  Yes  Patient-reported symptoms:  Other  Do you have a copy of your discharge instructions?:  Yes  Do you have all

## 2018-11-12 NOTE — TELEPHONE ENCOUNTER
Yee Figueroa called back and made appointment on Friday to get cath removed. He is getting his H&H done right now. He states that we are to all him once we receive the results so he knows if he should start Lovenox or not. Please advise once lab results received. Thank you.

## 2018-11-14 ENCOUNTER — TELEPHONE (OUTPATIENT)
Dept: UROLOGY | Age: 68
End: 2018-11-14

## 2018-11-14 ENCOUNTER — APPOINTMENT (OUTPATIENT)
Dept: CT IMAGING | Age: 68
DRG: 872 | End: 2018-11-14
Payer: COMMERCIAL

## 2018-11-14 ENCOUNTER — APPOINTMENT (OUTPATIENT)
Dept: GENERAL RADIOLOGY | Age: 68
DRG: 872 | End: 2018-11-14
Payer: COMMERCIAL

## 2018-11-14 ENCOUNTER — HOSPITAL ENCOUNTER (INPATIENT)
Age: 68
LOS: 1 days | Discharge: HOME | DRG: 872 | End: 2018-11-17
Attending: EMERGENCY MEDICINE | Admitting: INTERNAL MEDICINE
Payer: COMMERCIAL

## 2018-11-14 DIAGNOSIS — J18.9 PNEUMONIA DUE TO ORGANISM: ICD-10-CM

## 2018-11-14 DIAGNOSIS — T14.8XXA HEMATOMA: ICD-10-CM

## 2018-11-14 DIAGNOSIS — D72.829 LEUKOCYTOSIS, UNSPECIFIED TYPE: ICD-10-CM

## 2018-11-14 DIAGNOSIS — R50.82 POSTOPERATIVE FEVER: Primary | ICD-10-CM

## 2018-11-14 PROBLEM — R10.9 ABDOMINAL PAIN: Status: ACTIVE | Noted: 2018-11-14

## 2018-11-14 LAB
ALBUMIN SERPL-MCNC: 3.8 G/DL (ref 3.9–4.9)
ALP BLD-CCNC: 139 U/L (ref 35–104)
ALT SERPL-CCNC: 90 U/L (ref 0–41)
ANION GAP SERPL CALCULATED.3IONS-SCNC: 12 MEQ/L (ref 7–13)
AST SERPL-CCNC: 105 U/L (ref 0–40)
BACTERIA: ABNORMAL /HPF
BASOPHILS ABSOLUTE: 0.1 K/UL (ref 0–0.2)
BASOPHILS ABSOLUTE: 0.1 K/UL (ref 0–0.2)
BASOPHILS RELATIVE PERCENT: 0.5 %
BASOPHILS RELATIVE PERCENT: 0.6 %
BILIRUB SERPL-MCNC: 1.2 MG/DL (ref 0–1.2)
BILIRUBIN URINE: NEGATIVE
BLOOD, URINE: ABNORMAL
BUN BLDV-MCNC: 11 MG/DL (ref 8–23)
CALCIUM SERPL-MCNC: 8.9 MG/DL (ref 8.6–10.2)
CHLORIDE BLD-SCNC: 95 MEQ/L (ref 98–107)
CLARITY: CLEAR
CO2: 27 MEQ/L (ref 22–29)
COLOR: YELLOW
CREAT SERPL-MCNC: 1.14 MG/DL (ref 0.7–1.2)
EOSINOPHILS ABSOLUTE: 0 K/UL (ref 0–0.7)
EOSINOPHILS ABSOLUTE: 0.2 K/UL (ref 0–0.7)
EOSINOPHILS RELATIVE PERCENT: 0.2 %
EOSINOPHILS RELATIVE PERCENT: 1.5 %
GFR AFRICAN AMERICAN: >60
GFR NON-AFRICAN AMERICAN: >60
GLOBULIN: 3.4 G/DL (ref 2.3–3.5)
GLUCOSE BLD-MCNC: 131 MG/DL (ref 74–109)
GLUCOSE URINE: NEGATIVE MG/DL
HCT VFR BLD CALC: 31.1 % (ref 42–52)
HCT VFR BLD CALC: 32 % (ref 42–52)
HEMOGLOBIN: 10.5 G/DL (ref 14–18)
HEMOGLOBIN: 10.9 G/DL (ref 14–18)
INR BLD: 1
KETONES, URINE: NEGATIVE MG/DL
LACTIC ACID: 1.7 MMOL/L (ref 0.5–2.2)
LEUKOCYTE ESTERASE, URINE: ABNORMAL
LIPASE: 11 U/L (ref 13–60)
LYMPHOCYTES ABSOLUTE: 0.6 K/UL (ref 1–4.8)
LYMPHOCYTES ABSOLUTE: 0.9 K/UL (ref 1–4.8)
LYMPHOCYTES RELATIVE PERCENT: 4.7 %
LYMPHOCYTES RELATIVE PERCENT: 8.6 %
MCH RBC QN AUTO: 32.5 PG (ref 27–31.3)
MCH RBC QN AUTO: 33.4 PG (ref 27–31.3)
MCHC RBC AUTO-ENTMCNC: 34.1 % (ref 33–37)
MCHC RBC AUTO-ENTMCNC: 34.7 % (ref 33–37)
MCV RBC AUTO: 95.2 FL (ref 80–100)
MCV RBC AUTO: 96.3 FL (ref 80–100)
MONOCYTES ABSOLUTE: 1 K/UL (ref 0.2–0.8)
MONOCYTES ABSOLUTE: 1.2 K/UL (ref 0.2–0.8)
MONOCYTES RELATIVE PERCENT: 11.5 %
MONOCYTES RELATIVE PERCENT: 7.9 %
NEUTROPHILS ABSOLUTE: 10.8 K/UL (ref 1.4–6.5)
NEUTROPHILS ABSOLUTE: 8 K/UL (ref 1.4–6.5)
NEUTROPHILS RELATIVE PERCENT: 77.9 %
NEUTROPHILS RELATIVE PERCENT: 86.6 %
NITRITE, URINE: NEGATIVE
PDW BLD-RTO: 13.1 % (ref 11.5–14.5)
PDW BLD-RTO: 13.2 % (ref 11.5–14.5)
PH UA: 7 (ref 5–9)
PLATELET # BLD: 298 K/UL (ref 130–400)
PLATELET # BLD: 314 K/UL (ref 130–400)
POTASSIUM SERPL-SCNC: 4.4 MEQ/L (ref 3.5–5.1)
PROCALCITONIN: 0.13 NG/ML (ref 0–0.15)
PROTEIN UA: ABNORMAL MG/DL
PROTHROMBIN TIME: 10.8 SEC (ref 9.6–12.3)
RBC # BLD: 3.22 M/UL (ref 4.7–6.1)
RBC # BLD: 3.36 M/UL (ref 4.7–6.1)
RBC UA: ABNORMAL /HPF (ref 0–2)
SODIUM BLD-SCNC: 134 MEQ/L (ref 132–144)
SPECIFIC GRAVITY UA: 1.01 (ref 1–1.03)
TOTAL PROTEIN: 7.2 G/DL (ref 6.4–8.1)
URINE REFLEX TO CULTURE: YES
UROBILINOGEN, URINE: 0.2 E.U./DL
WBC # BLD: 10.3 K/UL (ref 4.8–10.8)
WBC # BLD: 12.5 K/UL (ref 4.8–10.8)
WBC UA: ABNORMAL /HPF (ref 0–5)

## 2018-11-14 PROCEDURE — 83690 ASSAY OF LIPASE: CPT

## 2018-11-14 PROCEDURE — 99285 EMERGENCY DEPT VISIT HI MDM: CPT

## 2018-11-14 PROCEDURE — G0378 HOSPITAL OBSERVATION PER HR: HCPCS

## 2018-11-14 PROCEDURE — 6360000002 HC RX W HCPCS: Performed by: EMERGENCY MEDICINE

## 2018-11-14 PROCEDURE — 80053 COMPREHEN METABOLIC PANEL: CPT

## 2018-11-14 PROCEDURE — 96376 TX/PRO/DX INJ SAME DRUG ADON: CPT

## 2018-11-14 PROCEDURE — 36415 COLL VENOUS BLD VENIPUNCTURE: CPT

## 2018-11-14 PROCEDURE — 85610 PROTHROMBIN TIME: CPT

## 2018-11-14 PROCEDURE — 84145 PROCALCITONIN (PCT): CPT

## 2018-11-14 PROCEDURE — 74177 CT ABD & PELVIS W/CONTRAST: CPT

## 2018-11-14 PROCEDURE — 96375 TX/PRO/DX INJ NEW DRUG ADDON: CPT

## 2018-11-14 PROCEDURE — 87086 URINE CULTURE/COLONY COUNT: CPT

## 2018-11-14 PROCEDURE — 85025 COMPLETE CBC W/AUTO DIFF WBC: CPT

## 2018-11-14 PROCEDURE — 71046 X-RAY EXAM CHEST 2 VIEWS: CPT

## 2018-11-14 PROCEDURE — 81001 URINALYSIS AUTO W/SCOPE: CPT

## 2018-11-14 PROCEDURE — 2580000003 HC RX 258: Performed by: EMERGENCY MEDICINE

## 2018-11-14 PROCEDURE — 83605 ASSAY OF LACTIC ACID: CPT

## 2018-11-14 PROCEDURE — 6360000004 HC RX CONTRAST MEDICATION: Performed by: RADIOLOGY

## 2018-11-14 PROCEDURE — 96374 THER/PROPH/DIAG INJ IV PUSH: CPT

## 2018-11-14 PROCEDURE — 87040 BLOOD CULTURE FOR BACTERIA: CPT

## 2018-11-14 RX ORDER — 0.9 % SODIUM CHLORIDE 0.9 %
500 INTRAVENOUS SOLUTION INTRAVENOUS ONCE
Status: COMPLETED | OUTPATIENT
Start: 2018-11-14 | End: 2018-11-15

## 2018-11-14 RX ORDER — ONDANSETRON 2 MG/ML
4 INJECTION INTRAMUSCULAR; INTRAVENOUS ONCE
Status: COMPLETED | OUTPATIENT
Start: 2018-11-14 | End: 2018-11-14

## 2018-11-14 RX ORDER — SODIUM CHLORIDE 0.9 % (FLUSH) 0.9 %
10 SYRINGE (ML) INJECTION ONCE
Status: DISCONTINUED | OUTPATIENT
Start: 2018-11-14 | End: 2018-11-17 | Stop reason: HOSPADM

## 2018-11-14 RX ORDER — SODIUM CHLORIDE 0.9 % (FLUSH) 0.9 %
3 SYRINGE (ML) INJECTION EVERY 8 HOURS
Status: DISCONTINUED | OUTPATIENT
Start: 2018-11-14 | End: 2018-11-15 | Stop reason: HOSPADM

## 2018-11-14 RX ORDER — 0.9 % SODIUM CHLORIDE 0.9 %
1500 INTRAVENOUS SOLUTION INTRAVENOUS ONCE
Status: COMPLETED | OUTPATIENT
Start: 2018-11-14 | End: 2018-11-14

## 2018-11-14 RX ADMIN — ONDANSETRON 4 MG: 2 INJECTION INTRAMUSCULAR; INTRAVENOUS at 21:10

## 2018-11-14 RX ADMIN — IOPAMIDOL 100 ML: 755 INJECTION, SOLUTION INTRAVENOUS at 21:33

## 2018-11-14 RX ADMIN — HYDROMORPHONE HYDROCHLORIDE 0.5 MG: 1 INJECTION, SOLUTION INTRAMUSCULAR; INTRAVENOUS; SUBCUTANEOUS at 23:37

## 2018-11-14 RX ADMIN — SODIUM CHLORIDE 500 ML: 9 INJECTION, SOLUTION INTRAVENOUS at 23:37

## 2018-11-14 RX ADMIN — ONDANSETRON 4 MG: 2 INJECTION INTRAMUSCULAR; INTRAVENOUS at 23:37

## 2018-11-14 RX ADMIN — HYDROMORPHONE HYDROCHLORIDE 0.5 MG: 1 INJECTION, SOLUTION INTRAMUSCULAR; INTRAVENOUS; SUBCUTANEOUS at 21:10

## 2018-11-14 RX ADMIN — SODIUM CHLORIDE 1500 ML: 9 INJECTION, SOLUTION INTRAVENOUS at 21:10

## 2018-11-14 ASSESSMENT — PAIN DESCRIPTION - FREQUENCY: FREQUENCY: CONTINUOUS

## 2018-11-14 ASSESSMENT — PAIN DESCRIPTION - PROGRESSION: CLINICAL_PROGRESSION: GRADUALLY IMPROVING

## 2018-11-14 ASSESSMENT — PAIN DESCRIPTION - DESCRIPTORS
DESCRIPTORS: ACHING
DESCRIPTORS: ACHING

## 2018-11-14 ASSESSMENT — PAIN DESCRIPTION - PAIN TYPE
TYPE: ACUTE PAIN
TYPE: ACUTE PAIN

## 2018-11-14 ASSESSMENT — PAIN DESCRIPTION - LOCATION
LOCATION: ABDOMEN
LOCATION: ABDOMEN

## 2018-11-14 ASSESSMENT — PAIN SCALES - GENERAL
PAINLEVEL_OUTOF10: 4
PAINLEVEL_OUTOF10: 4
PAINLEVEL_OUTOF10: 2
PAINLEVEL_OUTOF10: 2

## 2018-11-15 ENCOUNTER — TELEPHONE (OUTPATIENT)
Dept: UROLOGY | Age: 68
End: 2018-11-15

## 2018-11-15 ENCOUNTER — APPOINTMENT (OUTPATIENT)
Dept: ULTRASOUND IMAGING | Age: 68
DRG: 872 | End: 2018-11-15
Payer: COMMERCIAL

## 2018-11-15 VITALS
DIASTOLIC BLOOD PRESSURE: 73 MMHG | BODY MASS INDEX: 34.15 KG/M2 | SYSTOLIC BLOOD PRESSURE: 131 MMHG | HEIGHT: 67 IN | HEART RATE: 76 BPM | WEIGHT: 217.59 LBS | RESPIRATION RATE: 18 BRPM | OXYGEN SATURATION: 99 % | TEMPERATURE: 99.9 F

## 2018-11-15 PROBLEM — D72.829 LEUKOCYTOSIS: Status: ACTIVE | Noted: 2018-11-15

## 2018-11-15 PROBLEM — R05.9 COUGH: Status: ACTIVE | Noted: 2018-11-15

## 2018-11-15 PROBLEM — I10 BENIGN ESSENTIAL HTN: Chronic | Status: ACTIVE | Noted: 2018-06-01

## 2018-11-15 LAB
ALBUMIN SERPL-MCNC: 3.1 G/DL (ref 3.9–4.9)
ALP BLD-CCNC: 111 U/L (ref 35–104)
ALT SERPL-CCNC: 77 U/L (ref 0–41)
ANION GAP SERPL CALCULATED.3IONS-SCNC: 12 MEQ/L (ref 7–13)
AST SERPL-CCNC: 72 U/L (ref 0–40)
BASOPHILS ABSOLUTE: 0 K/UL (ref 0–0.2)
BASOPHILS RELATIVE PERCENT: 0.3 %
BILIRUB SERPL-MCNC: 1.1 MG/DL (ref 0–1.2)
BUN BLDV-MCNC: 10 MG/DL (ref 8–23)
C DIFFICILE TOXIN, EIA: ABNORMAL
CALCIUM SERPL-MCNC: 8.1 MG/DL (ref 8.6–10.2)
CHLORIDE BLD-SCNC: 100 MEQ/L (ref 98–107)
CO2: 23 MEQ/L (ref 22–29)
CREAT SERPL-MCNC: 1.26 MG/DL (ref 0.7–1.2)
CRYPTOSPORIDIUM ANTIGEN STOOL: NORMAL
EOSINOPHILS ABSOLUTE: 0.1 K/UL (ref 0–0.7)
EOSINOPHILS RELATIVE PERCENT: 0.7 %
GFR AFRICAN AMERICAN: >60
GFR AFRICAN AMERICAN: >60
GFR NON-AFRICAN AMERICAN: 56.9
GFR NON-AFRICAN AMERICAN: >60
GI BACTERIAL PATHOGENS BY PCR: NORMAL
GIARDIA ANTIGEN STOOL: NORMAL
GLOBULIN: 3 G/DL (ref 2.3–3.5)
GLUCOSE BLD-MCNC: 114 MG/DL (ref 74–109)
HAV IGM SER IA-ACNC: NORMAL
HCT VFR BLD CALC: 28.7 % (ref 42–52)
HEMOGLOBIN: 9.7 G/DL (ref 14–18)
HEPATITIS B CORE IGM ANTIBODY: NORMAL
HEPATITIS B SURFACE ANTIGEN INTERPRETATION: NORMAL
HEPATITIS C ANTIBODY INTERPRETATION: NORMAL
HEPATITIS INTERPRETATION:: NORMAL
LYMPHOCYTES ABSOLUTE: 0.7 K/UL (ref 1–4.8)
LYMPHOCYTES RELATIVE PERCENT: 4.9 %
MCH RBC QN AUTO: 32.4 PG (ref 27–31.3)
MCHC RBC AUTO-ENTMCNC: 33.7 % (ref 33–37)
MCV RBC AUTO: 96.2 FL (ref 80–100)
MONOCYTES ABSOLUTE: 1.1 K/UL (ref 0.2–0.8)
MONOCYTES RELATIVE PERCENT: 8 %
NEUTROPHILS ABSOLUTE: 12.4 K/UL (ref 1.4–6.5)
NEUTROPHILS RELATIVE PERCENT: 86.1 %
PDW BLD-RTO: 13.4 % (ref 11.5–14.5)
PERFORMED ON: NORMAL
PLATELET # BLD: 280 K/UL (ref 130–400)
POC CREATININE: 1.1 MG/DL (ref 0.8–1.3)
POC SAMPLE TYPE: NORMAL
POTASSIUM REFLEX MAGNESIUM: 4 MEQ/L (ref 3.5–5.1)
RAPID INFLUENZA  B AGN: NEGATIVE
RAPID INFLUENZA A AGN: NEGATIVE
RBC # BLD: 2.99 M/UL (ref 4.7–6.1)
SODIUM BLD-SCNC: 135 MEQ/L (ref 132–144)
TOTAL PROTEIN: 6.1 G/DL (ref 6.4–8.1)
WBC # BLD: 14.3 K/UL (ref 4.8–10.8)

## 2018-11-15 PROCEDURE — 87324 CLOSTRIDIUM AG IA: CPT

## 2018-11-15 PROCEDURE — 2580000003 HC RX 258: Performed by: INTERNAL MEDICINE

## 2018-11-15 PROCEDURE — 80053 COMPREHEN METABOLIC PANEL: CPT

## 2018-11-15 PROCEDURE — 6370000000 HC RX 637 (ALT 250 FOR IP): Performed by: INTERNAL MEDICINE

## 2018-11-15 PROCEDURE — 36415 COLL VENOUS BLD VENIPUNCTURE: CPT

## 2018-11-15 PROCEDURE — 85025 COMPLETE CBC W/AUTO DIFF WBC: CPT

## 2018-11-15 PROCEDURE — 87449 NOS EACH ORGANISM AG IA: CPT

## 2018-11-15 PROCEDURE — 87328 CRYPTOSPORIDIUM AG IA: CPT

## 2018-11-15 PROCEDURE — 80074 ACUTE HEPATITIS PANEL: CPT

## 2018-11-15 PROCEDURE — 87633 RESP VIRUS 12-25 TARGETS: CPT

## 2018-11-15 PROCEDURE — 96372 THER/PROPH/DIAG INJ SC/IM: CPT

## 2018-11-15 PROCEDURE — 87804 INFLUENZA ASSAY W/OPTIC: CPT

## 2018-11-15 PROCEDURE — 87209 SMEAR COMPLEX STAIN: CPT

## 2018-11-15 PROCEDURE — G0378 HOSPITAL OBSERVATION PER HR: HCPCS

## 2018-11-15 PROCEDURE — 87329 GIARDIA AG IA: CPT

## 2018-11-15 PROCEDURE — 99222 1ST HOSP IP/OBS MODERATE 55: CPT | Performed by: INTERNAL MEDICINE

## 2018-11-15 PROCEDURE — 6370000000 HC RX 637 (ALT 250 FOR IP): Performed by: HOSPITALIST

## 2018-11-15 PROCEDURE — 6360000002 HC RX W HCPCS: Performed by: INTERNAL MEDICINE

## 2018-11-15 PROCEDURE — 93971 EXTREMITY STUDY: CPT

## 2018-11-15 PROCEDURE — 2580000003 HC RX 258: Performed by: HOSPITALIST

## 2018-11-15 PROCEDURE — 96366 THER/PROPH/DIAG IV INF ADDON: CPT

## 2018-11-15 PROCEDURE — 87177 OVA AND PARASITES SMEARS: CPT

## 2018-11-15 PROCEDURE — 96367 TX/PROPH/DG ADDL SEQ IV INF: CPT

## 2018-11-15 PROCEDURE — 87506 IADNA-DNA/RNA PROBE TQ 6-11: CPT

## 2018-11-15 PROCEDURE — 6360000002 HC RX W HCPCS: Performed by: HOSPITALIST

## 2018-11-15 PROCEDURE — 96365 THER/PROPH/DIAG IV INF INIT: CPT

## 2018-11-15 RX ORDER — BACITRACIN, NEOMYCIN, POLYMYXIN B 400; 3.5; 5 [USP'U]/G; MG/G; [USP'U]/G
OINTMENT TOPICAL 2 TIMES DAILY
Status: DISCONTINUED | OUTPATIENT
Start: 2018-11-15 | End: 2018-11-17 | Stop reason: HOSPADM

## 2018-11-15 RX ORDER — KETOROLAC TROMETHAMINE 15 MG/ML
15 INJECTION, SOLUTION INTRAMUSCULAR; INTRAVENOUS EVERY 6 HOURS PRN
Status: DISCONTINUED | OUTPATIENT
Start: 2018-11-15 | End: 2018-11-15

## 2018-11-15 RX ORDER — ZOLPIDEM TARTRATE 5 MG/1
7.5 TABLET ORAL NIGHTLY PRN
Status: DISCONTINUED | OUTPATIENT
Start: 2018-11-15 | End: 2018-11-17 | Stop reason: HOSPADM

## 2018-11-15 RX ORDER — SODIUM CHLORIDE 9 MG/ML
INJECTION, SOLUTION INTRAVENOUS CONTINUOUS
Status: DISCONTINUED | OUTPATIENT
Start: 2018-11-15 | End: 2018-11-17 | Stop reason: HOSPADM

## 2018-11-15 RX ORDER — MULTIVITAMIN WITH FOLIC ACID 400 MCG
1 TABLET ORAL DAILY
Status: DISCONTINUED | OUTPATIENT
Start: 2018-11-15 | End: 2018-11-17 | Stop reason: HOSPADM

## 2018-11-15 RX ORDER — L. ACIDOPHILUS/L.BULGARICUS 1MM CELL
1 TABLET ORAL 2 TIMES DAILY
Status: DISCONTINUED | OUTPATIENT
Start: 2018-11-15 | End: 2018-11-17 | Stop reason: HOSPADM

## 2018-11-15 RX ORDER — FUROSEMIDE 20 MG/1
20 TABLET ORAL DAILY
Status: DISCONTINUED | OUTPATIENT
Start: 2018-11-15 | End: 2018-11-17 | Stop reason: HOSPADM

## 2018-11-15 RX ORDER — AMLODIPINE BESYLATE 5 MG/1
5 TABLET ORAL DAILY
Status: DISCONTINUED | OUTPATIENT
Start: 2018-11-15 | End: 2018-11-17 | Stop reason: HOSPADM

## 2018-11-15 RX ORDER — SODIUM CHLORIDE 0.9 % (FLUSH) 0.9 %
10 SYRINGE (ML) INJECTION EVERY 12 HOURS SCHEDULED
Status: DISCONTINUED | OUTPATIENT
Start: 2018-11-15 | End: 2018-11-17 | Stop reason: HOSPADM

## 2018-11-15 RX ORDER — ACETAMINOPHEN 325 MG/1
650 TABLET ORAL EVERY 4 HOURS PRN
Status: DISCONTINUED | OUTPATIENT
Start: 2018-11-15 | End: 2018-11-17 | Stop reason: HOSPADM

## 2018-11-15 RX ORDER — NITROGLYCERIN 0.4 MG/1
0.4 TABLET SUBLINGUAL EVERY 5 MIN PRN
Status: DISCONTINUED | OUTPATIENT
Start: 2018-11-15 | End: 2018-11-17 | Stop reason: HOSPADM

## 2018-11-15 RX ORDER — FAMOTIDINE 20 MG/1
20 TABLET, FILM COATED ORAL 2 TIMES DAILY
Status: DISCONTINUED | OUTPATIENT
Start: 2018-11-15 | End: 2018-11-17 | Stop reason: HOSPADM

## 2018-11-15 RX ORDER — ONDANSETRON 2 MG/ML
4 INJECTION INTRAMUSCULAR; INTRAVENOUS EVERY 6 HOURS PRN
Status: DISCONTINUED | OUTPATIENT
Start: 2018-11-15 | End: 2018-11-17 | Stop reason: HOSPADM

## 2018-11-15 RX ORDER — ROSUVASTATIN CALCIUM 10 MG/1
10 TABLET, COATED ORAL DAILY
Status: DISCONTINUED | OUTPATIENT
Start: 2018-11-15 | End: 2018-11-17 | Stop reason: HOSPADM

## 2018-11-15 RX ORDER — ACETAMINOPHEN 80 MG
TABLET,CHEWABLE ORAL ONCE
Status: COMPLETED | OUTPATIENT
Start: 2018-11-15 | End: 2018-11-15

## 2018-11-15 RX ORDER — SODIUM CHLORIDE 0.9 % (FLUSH) 0.9 %
10 SYRINGE (ML) INJECTION PRN
Status: DISCONTINUED | OUTPATIENT
Start: 2018-11-15 | End: 2018-11-17 | Stop reason: HOSPADM

## 2018-11-15 RX ORDER — LISINOPRIL 10 MG/1
10 TABLET ORAL DAILY
Status: DISCONTINUED | OUTPATIENT
Start: 2018-11-15 | End: 2018-11-17 | Stop reason: HOSPADM

## 2018-11-15 RX ORDER — ALLOPURINOL 100 MG/1
200 TABLET ORAL DAILY
Status: DISCONTINUED | OUTPATIENT
Start: 2018-11-15 | End: 2018-11-17 | Stop reason: HOSPADM

## 2018-11-15 RX ORDER — FLECAINIDE ACETATE 100 MG/1
100 TABLET ORAL 2 TIMES DAILY
Status: DISCONTINUED | OUTPATIENT
Start: 2018-11-15 | End: 2018-11-17 | Stop reason: HOSPADM

## 2018-11-15 RX ORDER — HYDROCODONE BITARTRATE AND ACETAMINOPHEN 5; 325 MG/1; MG/1
1 TABLET ORAL EVERY 4 HOURS PRN
Status: DISCONTINUED | OUTPATIENT
Start: 2018-11-15 | End: 2018-11-17 | Stop reason: HOSPADM

## 2018-11-15 RX ORDER — CLONIDINE HYDROCHLORIDE 0.1 MG/1
0.1 TABLET ORAL DAILY PRN
Status: DISCONTINUED | OUTPATIENT
Start: 2018-11-15 | End: 2018-11-17 | Stop reason: HOSPADM

## 2018-11-15 RX ORDER — SENNA AND DOCUSATE SODIUM 50; 8.6 MG/1; MG/1
1 TABLET, FILM COATED ORAL 2 TIMES DAILY PRN
Status: DISCONTINUED | OUTPATIENT
Start: 2018-11-15 | End: 2018-11-17 | Stop reason: HOSPADM

## 2018-11-15 RX ADMIN — VANCOMYCIN HYDROCHLORIDE 1250 MG: 500 INJECTION, POWDER, LYOPHILIZED, FOR SOLUTION INTRAVENOUS at 06:30

## 2018-11-15 RX ADMIN — LISINOPRIL 10 MG: 10 TABLET ORAL at 09:51

## 2018-11-15 RX ADMIN — SODIUM CHLORIDE: 9 INJECTION, SOLUTION INTRAVENOUS at 02:41

## 2018-11-15 RX ADMIN — Medication: at 11:37

## 2018-11-15 RX ADMIN — PIPERACILLIN SODIUM,TAZOBACTAM SODIUM 3.38 G: 3; .375 INJECTION, POWDER, FOR SOLUTION INTRAVENOUS at 02:41

## 2018-11-15 RX ADMIN — SODIUM CHLORIDE: 9 INJECTION, SOLUTION INTRAVENOUS at 17:03

## 2018-11-15 RX ADMIN — FAMOTIDINE 20 MG: 20 TABLET ORAL at 02:40

## 2018-11-15 RX ADMIN — PIPERACILLIN SODIUM,TAZOBACTAM SODIUM 3.38 G: 3; .375 INJECTION, POWDER, FOR SOLUTION INTRAVENOUS at 11:37

## 2018-11-15 RX ADMIN — Medication 250 MG: at 23:01

## 2018-11-15 RX ADMIN — CEFTRIAXONE SODIUM 1 G: 1 INJECTION, POWDER, FOR SOLUTION INTRAMUSCULAR; INTRAVENOUS at 20:52

## 2018-11-15 RX ADMIN — AMLODIPINE BESYLATE 5 MG: 5 TABLET ORAL at 09:51

## 2018-11-15 RX ADMIN — Medication 250 MG: at 17:01

## 2018-11-15 RX ADMIN — SODIUM CHLORIDE, PRESERVATIVE FREE 10 ML: 5 INJECTION INTRAVENOUS at 10:15

## 2018-11-15 RX ADMIN — LACTOBACILLUS TAB 1 TABLET: TAB at 17:01

## 2018-11-15 RX ADMIN — FLECAINIDE ACETATE 100 MG: 100 TABLET ORAL at 11:37

## 2018-11-15 RX ADMIN — ACETAMINOPHEN 650 MG: 325 TABLET ORAL at 02:40

## 2018-11-15 RX ADMIN — BACITRACIN ZINC, NEOMYCIN SULFATE, POLYMYXIN B SULFATE 1 G: 3.5; 5000; 4 OINTMENT TOPICAL at 20:50

## 2018-11-15 RX ADMIN — FLECAINIDE ACETATE 100 MG: 100 TABLET ORAL at 20:51

## 2018-11-15 RX ADMIN — SODIUM CHLORIDE, PRESERVATIVE FREE 10 ML: 5 INJECTION INTRAVENOUS at 20:52

## 2018-11-15 RX ADMIN — HYDROCODONE BITARTRATE AND ACETAMINOPHEN 1 TABLET: 5; 325 TABLET ORAL at 20:51

## 2018-11-15 RX ADMIN — ZOLPIDEM TARTRATE 7.5 MG: 5 TABLET ORAL at 02:40

## 2018-11-15 RX ADMIN — ENOXAPARIN SODIUM 40 MG: 40 INJECTION SUBCUTANEOUS at 12:34

## 2018-11-15 RX ADMIN — LACTOBACILLUS TAB 1 TABLET: TAB at 20:50

## 2018-11-15 RX ADMIN — ALLOPURINOL 200 MG: 100 TABLET ORAL at 09:51

## 2018-11-15 ASSESSMENT — PAIN SCALES - GENERAL
PAINLEVEL_OUTOF10: 2
PAINLEVEL_OUTOF10: 2
PAINLEVEL_OUTOF10: 4

## 2018-11-15 NOTE — ED PROVIDER NOTES
antibiotics and Prochlorperazine edisylate    FAMILY HISTORY       Family History   Problem Relation Age of Onset    Prostate Cancer Father 71    Kidney Cancer Father         62s     Cancer Maternal Aunt         d. 48 unknown cancer     Prostate Cancer Paternal Grandfather 76          SOCIAL HISTORY       Social History     Social History    Marital status:      Spouse name: N/A    Number of children: N/A    Years of education: N/A     Social History Main Topics    Smoking status: Never Smoker    Smokeless tobacco: Never Used    Alcohol use 1.2 oz/week     2 Cans of beer per week    Drug use: Unknown    Sexual activity: Not on file     Other Topics Concern    Not on file     Social History Narrative    No narrative on file       SCREENINGS      @FLOW([id number])@      PHYSICAL EXAM    (up to 7 for level 4, 8 or more for level 5)     ED Triage Vitals [11/14/18 2010]   BP Temp Temp Source Pulse Resp SpO2 Height Weight   (!) 147/89 100.7 °F (38.2 °C) Oral 84 18 99 % 5' 7\" (1.702 m) 220 lb (99.8 kg)       Physical Exam     CONST: -Well-developed well-nourished ;                -In no acute distress. -Vitals reviewed. EYES: -EOM intact, KAYLA:              -Sclera normal and conjunctiva: clear bilaterally. ENT: - Normal pharynx pink and moist.    NECK: -Supple (chin-to-chest).     CARD: -Rate and rhythm: Regular              -Murmurs: No  RESP: -Respiratory effort and chest excursion with respirations: Normal             -Breath sounds equal bilaterally: Clear             -Wheezes: No             -Rales: No    BACK: -Flank pain: No              -Pain on palpation: No    ABD: -Distended: No           -Bruits: No           -Bowel sounds: Normal.           -Deep palpation: Mildly tender in the lower half of the abdomen           -Organomegaly palpable: No           -Abnormal masses: No  Abdominal incisions look very good    Tee draining well, yellow clear fluid  EXT: Gross appearance Neutrophils # 10.8 (*)     Lymphocytes # 0.6 (*)     Monocytes # 1.0 (*)     All other components within normal limits   LIPASE - Abnormal; Notable for the following:     Lipase 11 (*)     All other components within normal limits   URINE RT REFLEX TO CULTURE - Abnormal; Notable for the following:     Blood, Urine LARGE (*)     Protein, UA TRACE (*)     Leukocyte Esterase, Urine TRACE (*)     All other components within normal limits   MICROSCOPIC URINALYSIS - Abnormal; Notable for the following:     RBC, UA 5-10 (*)     All other components within normal limits   CULTURE BLOOD #1   CULTURE BLOOD #2   URINE CULTURE   LACTIC ACID, PLASMA   PROCALCITONIN   PROTIME-INR       All other labs were within normal range or not returned as of this dictation. EMERGENCY DEPARTMENT COURSE and DIFFERENTIAL DIAGNOSIS/MDM:   Vitals:    Vitals:    11/14/18 2010 11/14/18 2202   BP: (!) 147/89 131/76   Pulse: 84 81   Resp: 18 18   Temp: 100.7 °F (38.2 °C) 100.2 °F (37.9 °C)   TempSrc: Oral Oral   SpO2: 99% 95%   Weight: 220 lb (99.8 kg)    Height: 5' 7\" (1.702 m)            MDM     I will discuss with hospitalist need for admission    CRITICAL CARE TIME   Total Critical Care time was  minutes, excluding separately reportableprocedures. There was a high probability of clinicallysignificant/life threatening deterioration in the patient's condition which required my urgent intervention. CONSULTS:  None    PROCEDURES:  Unless otherwise noted below, none     Procedures    FINAL IMPRESSION      1. Postoperative fever    2. Pneumonia due to organism          DISPOSITION/PLAN   DISPOSITION Decision To Admit 11/14/2018 10:47:34 PM      PATIENT REFERRED TO:  No follow-up provider specified.     DISCHARGE MEDICATIONS:  New Prescriptions    No medications on file          (Please note that portions of this note were completed with a voice recognition program.  Efforts were made to edit the dictations but occasionally words are

## 2018-11-15 NOTE — H&P
 Sleep apnea      PAST SURGICAL HISTORY:    Past Surgical History:   Procedure Laterality Date    HAND SURGERY      HIP SURGERY  2012    [place] LAP,PROSTATECTOMY,RADICAL,W/NERVE SPARE,INCL ROBOTIC N/A 11/9/2018    ROBOTIC RADICAL PROSTATECTOMY WITH PELVIC LYMPH NODES DISSECTION performed by Mini Lezama MD at [address]:    Family History   Problem Relation Age of Onset    Prostate Cancer Father 71    Kidney Cancer Father         62s     Cancer Maternal Aunt         d. 48 unknown cancer     Prostate Cancer Paternal Grandfather 76     SOCIAL HISTORY:    Social History     Social History    Marital status:      Spouse name: N/A    Number of children: N/A    Years of education: N/A     Occupational History    Not on file. Social History Main Topics    Smoking status: Never Smoker    Smokeless tobacco: Never Used    Alcohol use 1.2 oz/week     2 Cans of beer per week    Drug use: Unknown    Sexual activity: Not on file     Other Topics Concern    Not on file     Social History Narrative    No narrative on file     MEDICATIONS:   Prior to Admission medications    Medication Sig Start Date End Date Taking? Authorizing Provider   ciprofloxacin (CIPRO) 500 MG tablet Take 1 tablet by mouth 2 times daily for 6 days Take for 3 days then stop. Resume day prior to office visit. 11/11/18 11/17/18 Yes Santino Butts, APRN - CNP   HYDROcodone-acetaminophen (NORCO) 5-325 MG per tablet Take 1 tablet by mouth every 4 hours as needed for Pain for up to 7 days. . 11/10/18 11/17/18 Yes Santino Butts, APRN - CNP   senna (SENOKOT) 8.6 MG tablet Take 1 tablet by mouth 2 times daily as needed (constipation) 11/10/18 12/10/18 Yes Santino Butts, APRN - CNP   lisinopril (PRINIVIL;ZESTRIL) 10 MG tablet Patient states he Takes 1 pill in the am and 1 and 1/2 pills in the PM 6/5/18  Yes Cassie Gutierrez MD   zolpidem (AMBIEN) 5 MG tablet Patient states that he takes 1.5 pills every Night.

## 2018-11-15 NOTE — ED TRIAGE NOTES
Pt c/o abdominal pain and fever that started today, recent surgery on 11/09 radical prostatectomy with pelvic lymph node disection, Pt is A&OX3, calm, ambulatory febrile, breathes are equal and unlabored.

## 2018-11-15 NOTE — CONSULTS
11/15/2018     11/15/2018    K 4.0 11/15/2018     11/15/2018    CO2 23 11/15/2018       Hepatic Function Panel:   Lab Results   Component Value Date    ALKPHOS 111 11/15/2018    ALT 77 11/15/2018    AST 72 11/15/2018    PROT 6.1 11/15/2018    BILITOT 1.1 11/15/2018    LABALBU 3.1 11/15/2018       Microbiology:   No results for input(s): BC in the last 72 hours. No results for input(s): Nara Marus in the last 72 hours. Recent Labs      11/14/18 2125   LABURIN  No growth in <24 hours, culture reincubated     No results for input(s): WNDABS in the last 72 hours. No results for input(s): CULTRESP in the last 72 hours. Imaging:   THERE IS A HETEROGENEOUS COLLECTION WITHIN THE LOWER ABDOMEN, PELVIS. MAY REPRESENT COMBINATION OF BLOOD AND HEMATOMA SEROMA WITH SMALL COLLECTION OF AIR. UNDERLYING ABSCESS IS NOT EXCLUDED. 2. THERE IS MILD BILATERAL HYDRONEPHROSIS. THIS  MAY BE PHYSIOLOGIC AS THERE IS MASS EFFECT FROM THE ABOVE COLLECTION ON THE POSTERIOR BLADDER. IN ADDITION THERE IS A BERNARD CATHETER. THE BALLOON APPEARS TO BE BELOW THE LEVEL OF THE BLADDER (BEST APPRECIATED ON THE DELAYED STUDY). THIS MAY BE THE EXPECTED LOCATION BUT CORRELATION CLINICALLY AND WITH POSTSURGICAL EXPECTED POSITION OF BERNARD CATHETER GIVEN THIS PATIENT'S ROBOTIC PROSTATECTOMY IS NEEDED. 3. THE BLADDER WALL IS THICKENED THIS COULD BE ARTIFACTUAL DUE TO INCOMPLETE DISTENTION BUT THERE IS ALSO ASSOCIATED PERIVESICULAR STRANDING. CORRELATE WITH URINALYSIS. 4. THERE IS SCATTERED AIR THROUGHOUT THE ABDOMEN WHICH MAY BE POSTPROCEDURAL/POSTOPERATIVE. OTHER FINDINGS DETAILED ABOVE  4. THERE IS SCATTERED AIR THROUGHOUT THE ABDOMEN WHICH MAY BE POSTPROCEDURAL/POSTOPERATIVE.  OTHER FINDINGS DETAILED ABOVE        IMPRESSION:    · SIRS post recent robotic prostatectomy  · C diff colitis  · Postop intraabdominal hematoma with mild hydronephrosis    Patient Active Problem List   Diagnosis    Need for prophylactic vaccination and

## 2018-11-16 LAB
ALBUMIN SERPL-MCNC: 2.8 G/DL (ref 3.9–4.9)
ALP BLD-CCNC: 106 U/L (ref 35–104)
ALT SERPL-CCNC: 69 U/L (ref 0–41)
ANION GAP SERPL CALCULATED.3IONS-SCNC: 10 MEQ/L (ref 7–13)
AST SERPL-CCNC: 64 U/L (ref 0–40)
BASOPHILS ABSOLUTE: 0.1 K/UL (ref 0–0.2)
BASOPHILS RELATIVE PERCENT: 0.6 %
BILIRUB SERPL-MCNC: 0.5 MG/DL (ref 0–1.2)
BUN BLDV-MCNC: 9 MG/DL (ref 8–23)
CALCIUM SERPL-MCNC: 7.9 MG/DL (ref 8.6–10.2)
CHLORIDE BLD-SCNC: 108 MEQ/L (ref 98–107)
CO2: 24 MEQ/L (ref 22–29)
CREAT SERPL-MCNC: 1.1 MG/DL (ref 0.7–1.2)
EOSINOPHILS ABSOLUTE: 0.3 K/UL (ref 0–0.7)
EOSINOPHILS RELATIVE PERCENT: 3.5 %
GFR AFRICAN AMERICAN: >60
GFR NON-AFRICAN AMERICAN: >60
GLOBULIN: 2.9 G/DL (ref 2.3–3.5)
GLUCOSE BLD-MCNC: 97 MG/DL (ref 74–109)
HCT VFR BLD CALC: 27.4 % (ref 42–52)
HEMOGLOBIN: 9.4 G/DL (ref 14–18)
LYMPHOCYTES ABSOLUTE: 0.9 K/UL (ref 1–4.8)
LYMPHOCYTES RELATIVE PERCENT: 10.4 %
MCH RBC QN AUTO: 33 PG (ref 27–31.3)
MCHC RBC AUTO-ENTMCNC: 34.2 % (ref 33–37)
MCV RBC AUTO: 96.4 FL (ref 80–100)
MONOCYTES ABSOLUTE: 1.2 K/UL (ref 0.2–0.8)
MONOCYTES RELATIVE PERCENT: 13.9 %
NEUTROPHILS ABSOLUTE: 6.4 K/UL (ref 1.4–6.5)
NEUTROPHILS RELATIVE PERCENT: 71.6 %
PDW BLD-RTO: 13.3 % (ref 11.5–14.5)
PLATELET # BLD: 275 K/UL (ref 130–400)
POTASSIUM REFLEX MAGNESIUM: 4.2 MEQ/L (ref 3.5–5.1)
RBC # BLD: 2.85 M/UL (ref 4.7–6.1)
SODIUM BLD-SCNC: 142 MEQ/L (ref 132–144)
TOTAL PROTEIN: 5.7 G/DL (ref 6.4–8.1)
URINE CULTURE, ROUTINE: NORMAL
WBC # BLD: 9 K/UL (ref 4.8–10.8)

## 2018-11-16 PROCEDURE — 85025 COMPLETE CBC W/AUTO DIFF WBC: CPT

## 2018-11-16 PROCEDURE — 1210000000 HC MED SURG R&B

## 2018-11-16 PROCEDURE — 99232 SBSQ HOSP IP/OBS MODERATE 35: CPT | Performed by: INTERNAL MEDICINE

## 2018-11-16 PROCEDURE — 6360000002 HC RX W HCPCS: Performed by: HOSPITALIST

## 2018-11-16 PROCEDURE — 96372 THER/PROPH/DIAG INJ SC/IM: CPT

## 2018-11-16 PROCEDURE — 6370000000 HC RX 637 (ALT 250 FOR IP): Performed by: HOSPITALIST

## 2018-11-16 PROCEDURE — 80053 COMPREHEN METABOLIC PANEL: CPT

## 2018-11-16 PROCEDURE — 6360000002 HC RX W HCPCS: Performed by: INTERNAL MEDICINE

## 2018-11-16 PROCEDURE — 6370000000 HC RX 637 (ALT 250 FOR IP): Performed by: INTERNAL MEDICINE

## 2018-11-16 PROCEDURE — 2580000003 HC RX 258: Performed by: HOSPITALIST

## 2018-11-16 PROCEDURE — 36415 COLL VENOUS BLD VENIPUNCTURE: CPT

## 2018-11-16 RX ADMIN — LACTOBACILLUS TAB 1 TABLET: TAB at 08:02

## 2018-11-16 RX ADMIN — Medication 250 MG: at 04:57

## 2018-11-16 RX ADMIN — THERA TABS 1 TABLET: TAB at 08:03

## 2018-11-16 RX ADMIN — ALLOPURINOL 200 MG: 100 TABLET ORAL at 08:02

## 2018-11-16 RX ADMIN — FLECAINIDE ACETATE 100 MG: 100 TABLET ORAL at 08:02

## 2018-11-16 RX ADMIN — SODIUM CHLORIDE, PRESERVATIVE FREE 10 ML: 5 INJECTION INTRAVENOUS at 08:03

## 2018-11-16 RX ADMIN — HYDROCODONE BITARTRATE AND ACETAMINOPHEN 1 TABLET: 5; 325 TABLET ORAL at 01:06

## 2018-11-16 RX ADMIN — ENOXAPARIN SODIUM 40 MG: 40 INJECTION SUBCUTANEOUS at 08:03

## 2018-11-16 RX ADMIN — FLECAINIDE ACETATE 100 MG: 100 TABLET ORAL at 21:35

## 2018-11-16 RX ADMIN — Medication 250 MG: at 17:08

## 2018-11-16 RX ADMIN — BACITRACIN ZINC, NEOMYCIN SULFATE, POLYMYXIN B SULFATE: 3.5; 5000; 4 OINTMENT TOPICAL at 21:42

## 2018-11-16 RX ADMIN — LACTOBACILLUS TAB 1 TABLET: TAB at 21:36

## 2018-11-16 RX ADMIN — SODIUM CHLORIDE: 9 INJECTION, SOLUTION INTRAVENOUS at 01:08

## 2018-11-16 RX ADMIN — HYDROMORPHONE HYDROCHLORIDE 0.5 MG: 1 INJECTION, SOLUTION INTRAMUSCULAR; INTRAVENOUS; SUBCUTANEOUS at 17:08

## 2018-11-16 RX ADMIN — ZOLPIDEM TARTRATE 7.5 MG: 5 TABLET ORAL at 01:06

## 2018-11-16 RX ADMIN — Medication 250 MG: at 10:25

## 2018-11-16 RX ADMIN — ONDANSETRON 4 MG: 2 INJECTION INTRAMUSCULAR; INTRAVENOUS at 17:08

## 2018-11-16 RX ADMIN — BACITRACIN ZINC, NEOMYCIN SULFATE, POLYMYXIN B SULFATE 1 G: 3.5; 5000; 4 OINTMENT TOPICAL at 08:11

## 2018-11-16 ASSESSMENT — PAIN SCALES - GENERAL
PAINLEVEL_OUTOF10: 2
PAINLEVEL_OUTOF10: 6
PAINLEVEL_OUTOF10: 5

## 2018-11-16 ASSESSMENT — ENCOUNTER SYMPTOMS
ABDOMINAL DISTENTION: 1
ABDOMINAL PAIN: 1
SHORTNESS OF BREATH: 0
WHEEZING: 0
DIARRHEA: 1
STRIDOR: 0
CONSTIPATION: 0

## 2018-11-16 NOTE — CARE COORDINATION
DISCHARGE PLAN HOME WITH WIFE  BLOOD CULTURE AND URINE CULTURE NEGATIVE, CDIFF POSITIVE,  WILL CONTINUE TO FOLLOW.

## 2018-11-17 LAB
ALBUMIN SERPL-MCNC: 2.8 G/DL (ref 3.9–4.9)
ALP BLD-CCNC: 104 U/L (ref 35–104)
ALT SERPL-CCNC: 67 U/L (ref 0–41)
ANION GAP SERPL CALCULATED.3IONS-SCNC: 10 MEQ/L (ref 7–13)
AST SERPL-CCNC: 65 U/L (ref 0–40)
BASOPHILS ABSOLUTE: 0 K/UL (ref 0–0.2)
BASOPHILS RELATIVE PERCENT: 0.6 %
BILIRUB SERPL-MCNC: 0.6 MG/DL (ref 0–1.2)
BUN BLDV-MCNC: 7 MG/DL (ref 8–23)
CALCIUM SERPL-MCNC: 8.6 MG/DL (ref 8.6–10.2)
CHLORIDE BLD-SCNC: 109 MEQ/L (ref 98–107)
CO2: 22 MEQ/L (ref 22–29)
CREAT SERPL-MCNC: 0.83 MG/DL (ref 0.7–1.2)
EOSINOPHILS ABSOLUTE: 0.3 K/UL (ref 0–0.7)
EOSINOPHILS RELATIVE PERCENT: 4.6 %
GFR AFRICAN AMERICAN: >60
GFR NON-AFRICAN AMERICAN: >60
GLOBULIN: 2.8 G/DL (ref 2.3–3.5)
GLUCOSE BLD-MCNC: 95 MG/DL (ref 74–109)
HCT VFR BLD CALC: 27.4 % (ref 42–52)
HEMOGLOBIN: 9.5 G/DL (ref 14–18)
L. PNEUMOPHILA SEROGP 1 UR AG: NEGATIVE
LYMPHOCYTES ABSOLUTE: 0.8 K/UL (ref 1–4.8)
LYMPHOCYTES RELATIVE PERCENT: 10.8 %
MAGNESIUM: 2.3 MG/DL (ref 1.7–2.3)
MCH RBC QN AUTO: 33.3 PG (ref 27–31.3)
MCHC RBC AUTO-ENTMCNC: 34.8 % (ref 33–37)
MCV RBC AUTO: 95.6 FL (ref 80–100)
MONOCYTES ABSOLUTE: 1.1 K/UL (ref 0.2–0.8)
MONOCYTES RELATIVE PERCENT: 14.7 %
NEUTROPHILS ABSOLUTE: 5.1 K/UL (ref 1.4–6.5)
NEUTROPHILS RELATIVE PERCENT: 69.3 %
PDW BLD-RTO: 13.2 % (ref 11.5–14.5)
PLATELET # BLD: 312 K/UL (ref 130–400)
POTASSIUM REFLEX MAGNESIUM: 3.5 MEQ/L (ref 3.5–5.1)
RBC # BLD: 2.87 M/UL (ref 4.7–6.1)
SODIUM BLD-SCNC: 141 MEQ/L (ref 132–144)
TOTAL PROTEIN: 5.6 G/DL (ref 6.4–8.1)
WBC # BLD: 7.4 K/UL (ref 4.8–10.8)

## 2018-11-17 PROCEDURE — 6360000002 HC RX W HCPCS: Performed by: INTERNAL MEDICINE

## 2018-11-17 PROCEDURE — 36415 COLL VENOUS BLD VENIPUNCTURE: CPT

## 2018-11-17 PROCEDURE — 2580000003 HC RX 258: Performed by: HOSPITALIST

## 2018-11-17 PROCEDURE — 85025 COMPLETE CBC W/AUTO DIFF WBC: CPT

## 2018-11-17 PROCEDURE — 6370000000 HC RX 637 (ALT 250 FOR IP): Performed by: HOSPITALIST

## 2018-11-17 PROCEDURE — 6370000000 HC RX 637 (ALT 250 FOR IP): Performed by: INTERNAL MEDICINE

## 2018-11-17 PROCEDURE — 83735 ASSAY OF MAGNESIUM: CPT

## 2018-11-17 PROCEDURE — 80053 COMPREHEN METABOLIC PANEL: CPT

## 2018-11-17 PROCEDURE — 99232 SBSQ HOSP IP/OBS MODERATE 35: CPT | Performed by: INTERNAL MEDICINE

## 2018-11-17 RX ADMIN — Medication 250 MG: at 05:43

## 2018-11-17 RX ADMIN — Medication 250 MG: at 11:17

## 2018-11-17 RX ADMIN — LISINOPRIL 10 MG: 10 TABLET ORAL at 09:42

## 2018-11-17 RX ADMIN — ALLOPURINOL 200 MG: 100 TABLET ORAL at 09:42

## 2018-11-17 RX ADMIN — SODIUM CHLORIDE: 9 INJECTION, SOLUTION INTRAVENOUS at 00:51

## 2018-11-17 RX ADMIN — HYDROCODONE BITARTRATE AND ACETAMINOPHEN 1 TABLET: 5; 325 TABLET ORAL at 00:50

## 2018-11-17 RX ADMIN — FLECAINIDE ACETATE 100 MG: 100 TABLET ORAL at 09:42

## 2018-11-17 RX ADMIN — ROSUVASTATIN CALCIUM 10 MG: 10 TABLET, FILM COATED ORAL at 09:42

## 2018-11-17 RX ADMIN — Medication 250 MG: at 00:41

## 2018-11-17 RX ADMIN — BACITRACIN ZINC, NEOMYCIN SULFATE, POLYMYXIN B SULFATE 1 G: 3.5; 5000; 4 OINTMENT TOPICAL at 09:42

## 2018-11-17 RX ADMIN — THERA TABS 1 TABLET: TAB at 09:42

## 2018-11-17 RX ADMIN — HYDROCODONE BITARTRATE AND ACETAMINOPHEN 1 TABLET: 5; 325 TABLET ORAL at 05:43

## 2018-11-17 RX ADMIN — SODIUM CHLORIDE: 9 INJECTION, SOLUTION INTRAVENOUS at 11:17

## 2018-11-17 RX ADMIN — LACTOBACILLUS TAB 1 TABLET: TAB at 09:42

## 2018-11-17 RX ADMIN — AMLODIPINE BESYLATE 5 MG: 5 TABLET ORAL at 09:41

## 2018-11-17 RX ADMIN — ZOLPIDEM TARTRATE 7.5 MG: 5 TABLET ORAL at 00:50

## 2018-11-17 RX ADMIN — ENOXAPARIN SODIUM 40 MG: 40 INJECTION SUBCUTANEOUS at 09:41

## 2018-11-17 RX ADMIN — Medication 250 MG: at 16:59

## 2018-11-17 ASSESSMENT — PAIN DESCRIPTION - LOCATION: LOCATION: ABDOMEN

## 2018-11-17 ASSESSMENT — PAIN SCALES - GENERAL
PAINLEVEL_OUTOF10: 6
PAINLEVEL_OUTOF10: 5
PAINLEVEL_OUTOF10: 4
PAINLEVEL_OUTOF10: 3
PAINLEVEL_OUTOF10: 5

## 2018-11-17 ASSESSMENT — PAIN DESCRIPTION - PAIN TYPE: TYPE: ACUTE PAIN

## 2018-11-17 NOTE — PROGRESS NOTES
Infectious Disease Progress Note    Patient informed that I am an ID physician on entry and permission obtained from the patient to speak in front of any visitors prior to any discussion for HIPPA purposes. 11/17/2018    Patient is a hospital followup regarding C diff colitis S/P prostatectomy with postop intraabdominal hematoma on PO Vanco     Subjectively, no new complaints at this time. Questions answered to the best of my ability. Wife at bedside. General: Patient in no acute distress, cooperative  Skin: no new rashes, post surgical areas are dark pink and look somewhat irritated. HEENT: EOMI, MMM, Neck is supple  Heart: S1 S2  Lungs:bilaterally clear  Abdomen: soft, ND, +BS, NTTP. The post surgical areas are dark pink, DRY  Extrem:+pulses, no calf pain  Neuro exam: CN II-XII intact      Lab Results   Component Value Date    WBC 7.4 11/17/2018    HGB 9.5 (L) 11/17/2018    HCT 27.4 (L) 11/17/2018    MCV 95.6 11/17/2018     11/17/2018     Lab Results   Component Value Date     11/17/2018    K 3.5 11/17/2018     11/17/2018    CO2 22 11/17/2018    BUN 7 11/17/2018    CREATININE 0.83 11/17/2018    GLUCOSE 95 11/17/2018    CALCIUM 8.6 11/17/2018        WBC trends are being monitored. Antibiotic doses are being adjusted per most recent renal labs. Vitals:    11/15/18 0951   BP: 131/73   Pulse: 76   Resp:    Temp:    SpO2: 99%           Patient Active Problem List   Diagnosis    Need for prophylactic vaccination and inoculation against varicella    Benign essential HTN    Frequent PVCs    Primary insomnia    Prostate cancer (HCC)    Abdominal pain    Leukocytosis    Cough           ASSESSMENT and PLAN:    From an ID standpoint, will continue treatment for C diff colitis S/P prostatectomy with postop intraabdominal hematoma on PO Vanco    Call if any issues with the incision sites or increased irritation    Follow up with surgeon on Monday.      Imaging and labs were reviewed
Results   Component Value Date    CREATININE 1.10 11/16/2018    BUN 9 11/16/2018     11/16/2018    K 4.2 11/16/2018     (H) 11/16/2018    CO2 24 11/16/2018       Hepatic Function Panel:  Lab Results   Component Value Date    ALKPHOS 106 11/16/2018    ALT 69 11/16/2018    AST 64 11/16/2018    PROT 5.7 11/16/2018    BILITOT 0.5 11/16/2018    LABALBU 2.8 11/16/2018       Microbiology:   Recent Labs      11/14/18 2103   BC  No Growth to date. Any change in status will be called. Recent Labs      11/14/18 2103   BLOODCULT2  No Growth to date. Any change in status will be called.      Recent Labs      11/14/18 2125   LABURIN  No growth 24 hours       IMPRESSION:    · C diff colitis  · Postop intaabdominal hematoma  · Prostate cancer    Patient Active Problem List   Diagnosis    Need for prophylactic vaccination and inoculation against varicella    Benign essential HTN    Frequent PVCs    Primary insomnia    Prostate cancer ([address]    Abdominal pain    Leukocytosis    Cough       PLAN:  · PO Vanco and Lactobacillus  · Yogurt  · D/C IV Rocephin  · F/U Blood Cx    Discussed with patient and spouse    Marj Alvarado MD
leg swelling. Gastrointestinal: Positive for abdominal distention, abdominal pain and diarrhea. Negative for constipation. Medications:  Reviewed    Infusion Medications:    sodium chloride 100 mL/hr at 11/16/18 0108     Scheduled Medications:    sodium chloride flush  10 mL Intravenous 2 times per day    famotidine  20 mg Oral BID    allopurinol  200 mg Oral Daily    amLODIPine  5 mg Oral Daily    flecainide  100 mg Oral BID    furosemide  20 mg Oral Daily    lisinopril  10 mg Oral Daily    multivitamin  1 tablet Oral Daily    rosuvastatin  10 mg Oral Daily    enoxaparin  40 mg Subcutaneous Daily    vancomycin  250 mg Oral 4 times per day    lactobacillus acidophilus  1 tablet Oral BID    neomycin-bacitracin-polymyxin   Topical BID    sodium chloride flush  10 mL Intravenous Once     PRN Meds: sodium chloride flush, magnesium hydroxide, ondansetron, acetaminophen, HYDROmorphone, cloNIDine, HYDROcodone-acetaminophen, nitroGLYCERIN, sennosides-docusate sodium, zolpidem    Labs:   Recent Labs      11/14/18   2045  11/15/18   0533  11/16/18   0526   WBC  12.5*  14.3*  9.0   HGB  10.9*  9.7*  9.4*   HCT  32.0*  28.7*  27.4*   PLT  298  280  275     Recent Labs      11/14/18   2045  11/14/18   2107  11/15/18   0533  11/16/18   0526   NA  134   --   135  142   K  4.4   --   4.0  4.2   CL  95*   --   100  108*   CO2  27   --   23  24   BUN  11   --   10  9   CREATININE  1.14  1.1  1.26*  1.10   CALCIUM  8.9   --   8.1*  7.9*     Recent Labs      11/14/18   2045  11/15/18   0533  11/16/18   0526   AST  105*  72*  64*   ALT  90*  77*  69*   BILITOT  1.2  1.1  0.5   ALKPHOS  139*  111*  106*     Recent Labs      11/14/18 2045   INR  1.0     No results for input(s): CKTOTAL, TROPONINI in the last 72 hours.     Urinalysis:   Lab Results   Component Value Date    NITRU Negative 11/14/2018    WBCUA 0-2 11/14/2018    BACTERIA Rare 11/14/2018    RBCUA 5-10 11/14/2018    BLOODU LARGE 11/14/2018    SPECGRAV

## 2018-11-18 LAB
ADENOVIRUS SPECIES BE: NOT DETECTED
ADENOVIRUS SPECIES C: NOT DETECTED
HUMAN METAPNEUMOVIRUS PCR: NOT DETECTED
INFLUENZA A BY PCR: NOT DETECTED
INFLUENZA A H1 (2009) PCR: NOT DETECTED
INFLUENZA A H1 (2009) PCR: NOT DETECTED
INFLUENZA A H3 PCR: NOT DETECTED
INFLUENZA B BY PCR: NOT DETECTED
PARAINFLUENZA 2: NOT DETECTED
PARAINFLUENZA 3: NOT DETECTED
PARAINFLUENZA1: NOT DETECTED
RHINOVIRUS RNA XXX PCR: NOT DETECTED
RSV A AB BY PCR: NOT DETECTED
RSV B AB BY PCR: NOT DETECTED
RVP SOURCE: NORMAL

## 2018-11-19 ENCOUNTER — TELEPHONE (OUTPATIENT)
Dept: UROLOGY | Age: 68
End: 2018-11-19

## 2018-11-19 ENCOUNTER — TELEPHONE (OUTPATIENT)
Dept: INFECTIOUS DISEASES | Age: 68
End: 2018-11-19

## 2018-11-19 ENCOUNTER — OFFICE VISIT (OUTPATIENT)
Dept: UROLOGY | Age: 68
End: 2018-11-19

## 2018-11-19 VITALS
SYSTOLIC BLOOD PRESSURE: 136 MMHG | BODY MASS INDEX: 33.49 KG/M2 | DIASTOLIC BLOOD PRESSURE: 80 MMHG | WEIGHT: 221 LBS | HEIGHT: 68 IN

## 2018-11-19 DIAGNOSIS — C61 PROSTATE CANCER (HCC): Primary | ICD-10-CM

## 2018-11-19 DIAGNOSIS — A04.72 C. DIFFICILE COLITIS: Primary | ICD-10-CM

## 2018-11-19 LAB
OVA AND PARASITE TRICHROME: NEGATIVE
OVA AND PARASITE WET MOUNT: NEGATIVE

## 2018-11-19 PROCEDURE — 99024 POSTOP FOLLOW-UP VISIT: CPT | Performed by: UROLOGY

## 2018-11-19 NOTE — PROGRESS NOTES
HENT:    Head: Normocephalic and atraumatic.    Mouth/Throat: Oropharynx is clear and moist and mucous membranes are normal. No oropharyngeal exudate. Eyes: EOM are normal. Pupils are equal, round, and reactive to light. Right eye exhibits no discharge. Left eye exhibits no discharge. No scleral icterus. Neck: Trachea normal. No JVD present. No tracheal deviation present. Cardiovascular: Normal rate and regular rhythm. Pulmonary/Chest: Effort normal. No respiratory distress. no wheezes. Abdominal: Soft. exhibits no distension. There is no tenderness. There is no rebound and no CVA tenderness. Musculoskeletal: no edema or tenderness. Lymphadenopathy:   Right: No supraclavicular adenopathy present. Left: No supraclavicular adenopathy present. Neurological: alert and oriented to person, place, and time. No cranial nerve deficit. Skin: Skin is warm and dry. not diaphoretic. Psychiatric: normal mood and affect. behavior is normal.   Nursing note and vitals reviewed. Labs    No results found for this visit on 11/19/18. Lab Results   Component Value Date    CREATININE 0.83 11/17/2018    BUN 7 (L) 11/17/2018     11/17/2018    K 3.5 11/17/2018     (H) 11/17/2018    CO2 22 11/17/2018       Lab Results   Component Value Date    PSA 3.85 11/05/2018     PATHOLOGY REPORT                      ATTN: KORY FOWLER                      REQ: Clerance Ambrosia      Clinical Information: PROSTATE CANCER    FINAL DIAGNOSIS:  A.  Prostate, radical prostatectomy:      Adenocarcinoma of the prostate, Althea score 4+3 = 7, involving      approximately 5% of prostatic tissue, with perineural invasion and      extraprostatic extension,      stage pT3aN0.   Margins negative for carcinoma.      High-grade prostatic intraepithelial neoplasia.   Glandular and stromal hyperplasia. B.  Lymph nodes, left pelvic, excision:   One lymph node, negative for malignancy (0/1).   C.  Lymph nodes, right pelvic, excision:   Two lymph nodes, negative for malignancy (0/2). We discussed his Surgical pathology today at length. He developed C Diff in the hospital. His H&H is stable. We will send pathology for Decipher testing. If Decipher comes back at a high risk we will consider adjuvant radiation therapy. Staples and Tee catheter removed today. His Tee catheter and staples are out today. He can return to work when he is comfortable. He should do no heavy lifting for the next 4 weeks. Plan:  Send for Decipher testing. Return in 8 weeks with [facility]- PSA check.

## 2018-11-20 ENCOUNTER — TELEPHONE (OUTPATIENT)
Dept: PRIMARY CARE CLINIC | Age: 68
End: 2018-11-20

## 2018-11-20 ENCOUNTER — TELEPHONE (OUTPATIENT)
Dept: UROLOGY | Age: 68
End: 2018-11-20

## 2018-11-20 DIAGNOSIS — A04.72 C. DIFFICILE COLITIS: Primary | ICD-10-CM

## 2018-11-20 LAB
BLOOD CULTURE, ROUTINE: NORMAL
CULTURE, BLOOD 2: NORMAL

## 2018-11-23 PROBLEM — M10.9 GOUT: Status: ACTIVE | Noted: 2018-11-23

## 2018-11-23 PROBLEM — M19.90 OSTEOARTHROSIS: Status: ACTIVE | Noted: 2018-11-23

## 2018-11-23 PROBLEM — R97.20 RISING PSA LEVEL: Status: ACTIVE | Noted: 2018-05-10

## 2018-11-23 PROBLEM — R97.20 ELEVATED PSA MEASUREMENT: Status: ACTIVE | Noted: 2018-03-23

## 2018-11-23 PROBLEM — E78.5 HYPERLIPIDEMIA: Status: ACTIVE | Noted: 2018-11-23

## 2018-11-23 PROBLEM — I10 HYPERTENSION: Status: ACTIVE | Noted: 2018-11-23

## 2018-11-26 ENCOUNTER — TELEPHONE (OUTPATIENT)
Dept: UROLOGY | Age: 68
End: 2018-11-26

## 2018-11-26 ENCOUNTER — OFFICE VISIT (OUTPATIENT)
Dept: PRIMARY CARE CLINIC | Age: 68
End: 2018-11-26
Payer: COMMERCIAL

## 2018-11-26 ENCOUNTER — OFFICE VISIT (OUTPATIENT)
Dept: INFECTIOUS DISEASES | Age: 68
End: 2018-11-26
Payer: COMMERCIAL

## 2018-11-26 VITALS
DIASTOLIC BLOOD PRESSURE: 76 MMHG | OXYGEN SATURATION: 97 % | WEIGHT: 212 LBS | RESPIRATION RATE: 20 BRPM | TEMPERATURE: 98.2 F | HEART RATE: 84 BPM | SYSTOLIC BLOOD PRESSURE: 115 MMHG | BODY MASS INDEX: 32.13 KG/M2 | HEIGHT: 68 IN

## 2018-11-26 VITALS
TEMPERATURE: 97.4 F | BODY MASS INDEX: 32.02 KG/M2 | DIASTOLIC BLOOD PRESSURE: 77 MMHG | HEART RATE: 83 BPM | WEIGHT: 204 LBS | SYSTOLIC BLOOD PRESSURE: 117 MMHG | HEIGHT: 67 IN

## 2018-11-26 DIAGNOSIS — D50.0 BLOOD LOSS ANEMIA: ICD-10-CM

## 2018-11-26 DIAGNOSIS — A04.72 CLOSTRIDIUM DIFFICILE COLITIS: Primary | ICD-10-CM

## 2018-11-26 DIAGNOSIS — A04.72 C. DIFFICILE DIARRHEA: ICD-10-CM

## 2018-11-26 DIAGNOSIS — I10 BENIGN ESSENTIAL HTN: Primary | Chronic | ICD-10-CM

## 2018-11-26 DIAGNOSIS — Z90.79 S/P PROSTATECTOMY: ICD-10-CM

## 2018-11-26 DIAGNOSIS — E78.00 PURE HYPERCHOLESTEROLEMIA: ICD-10-CM

## 2018-11-26 PROCEDURE — 99214 OFFICE O/P EST MOD 30 MIN: CPT | Performed by: INTERNAL MEDICINE

## 2018-11-26 PROCEDURE — 99204 OFFICE O/P NEW MOD 45 MIN: CPT | Performed by: INTERNAL MEDICINE

## 2018-11-26 RX ORDER — INFLUENZA A VIRUS A/MICHIGAN/45/2015 X-275 (H1N1) ANTIGEN (FORMALDEHYDE INACTIVATED), INFLUENZA A VIRUS A/SINGAPORE/INFIMH-16-0019/2016 IVR-186 (H3N2) ANTIGEN (FORMALDEHYDE INACTIVATED), AND INFLUENZA B VIRUS B/MARYLAND/15/2016 BX-69A (A B/COLORADO/6/2017-LIKE VIRUS) ANTIGEN (FORMALDEHYDE INACTIVATED) 60; 60; 60 UG/.5ML; UG/.5ML; UG/.5ML
INJECTION, SUSPENSION INTRAMUSCULAR
Refills: 0 | COMMUNITY
Start: 2018-09-12 | End: 2019-04-22 | Stop reason: ALTCHOICE

## 2018-11-26 ASSESSMENT — ENCOUNTER SYMPTOMS
RESPIRATORY NEGATIVE: 1
ALLERGIC/IMMUNOLOGIC NEGATIVE: 1
GASTROINTESTINAL NEGATIVE: 1
EYES NEGATIVE: 1

## 2018-11-26 NOTE — TELEPHONE ENCOUNTER
Returned call to pt. Danii Foreman had discussed a return to work date, 12/7/18,  with Dr Thai Juarez at last office visit, 11/19/18. Return to work authorization completed for [facility] faxed to  [phone]. Scanned to chart.   Original mailed bact to pt at his request.

## 2018-11-26 NOTE — PROGRESS NOTES
Negative. Genitourinary: Negative. Musculoskeletal: Negative. Skin: Negative. Allergic/Immunologic: Negative. Neurological: Negative. Hematological: Negative. Psychiatric/Behavioral: Negative. All other systems reviewed and are negative. Objective:     Physical Exam   Constitutional: He is oriented to person, place, and time. He appears well-developed and well-nourished. HENT:   Head: Normocephalic and atraumatic. Neck: Neck supple. Cardiovascular: Normal rate and regular rhythm. Pulmonary/Chest: Effort normal and breath sounds normal.   Musculoskeletal: He exhibits no edema. Neurological: He is alert and oriented to person, place, and time. Skin: Skin is warm and dry. Psychiatric: He has a normal mood and affect. His behavior is normal.   Visit Information    Have you changed or started any medications since your last visit including any over-the-counter medicines, vitamins, or herbal medicines? no   Have you stopped taking any of your medications? Is so, why? -  no  Are you having any side effects from any of your medications? - no    Have you seen any other physician or provider since your last visit?  no   Have you had any other diagnostic tests since your last visit?  no   Have you been seen in the emergency room and/or had an admission in a hospital since we last saw you?  no   Have you had your routine dental cleaning in the past 6 months?  no     Do you have an active MyChart account? If no, what is the barrier?   Yes    Patient Care Team:  Birsa Owens MD as PCP - General (Internal Medicine)  Brisa Owens MD as PCP - [facility] Attributed Provider  James Avilez MD as Consulting Physician (Infectious Diseases)    Medical History Review  Past Medical, Family, and Social History reviewed and does not contribute to the patient presenting condition    Health Maintenance   Topic Date Due    Lipid screen  08/30/1990    Colon cancer screen colonoscopy  08/30/2000

## 2018-11-26 NOTE — PROGRESS NOTES
needed (constipation), Disp: 30 tablet, Rfl: 0    lisinopril (PRINIVIL;ZESTRIL) 10 MG tablet, Patient states he Takes 1 pill in the am and 1 and 1/2 pills in the PM, Disp: 225 tablet, Rfl: 1    zolpidem (AMBIEN) 5 MG tablet, Patient states that he takes 1.5 pills every Night., Disp: 135 tablet, Rfl: 1    Respiratory Therapy Supplies (ADULT MASK) GERALD, CPAP mask and tubing., Disp: 1 Device, Rfl: 0    Multiple Vitamin (MULTI-VITAMIN) TABS, Take 1 tablet by mouth, Disp: , Rfl:     nitroGLYCERIN (NITROSTAT) 0.4 MG SL tablet, Place 0.4 mg under the tongue, Disp: , Rfl:     allopurinol (ZYLOPRIM) 300 MG tablet, Take 1 tablet by mouth daily (Patient taking differently: Take 200 mg by mouth daily Takes approximately 200mg-3/4 of a tablet), Disp: 90 tablet, Rfl: 3    amLODIPine (NORVASC) 5 MG tablet, Take 1 tablet by mouth daily, Disp: 90 tablet, Rfl: 3    cloNIDine (CATAPRES) 0.1 MG tablet, Take 1 tablet by mouth 2 times daily (Patient taking differently: Take 0.1 mg by mouth daily as needed for High Blood Pressure (150/90 or above) ), Disp: 180 tablet, Rfl: 3    flecainide (TAMBOCOR) 100 MG tablet, Take 1 tablet by mouth 2 times daily, Disp: 180 tablet, Rfl: 3    furosemide (LASIX) 20 MG tablet, Take 1 tablet by mouth daily (Patient taking differently: Take 20 mg by mouth daily Taking twice a week), Disp: 90 tablet, Rfl: 3    meloxicam (MOBIC) 7.5 MG tablet, Take 1 tablet by mouth 2 times daily, Disp: 180 tablet, Rfl: 3    rosuvastatin (CRESTOR) 10 MG tablet, Take 1 tablet by mouth daily, Disp: 90 tablet, Rfl: 3     Social History:     Social History     Social History    Marital status:      Spouse name: N/A    Number of children: N/A    Years of education: N/A     Occupational History    Not on file.      Social History Main Topics    Smoking status: Never Smoker    Smokeless tobacco: Never Used    Alcohol use 1.2 oz/week     2 Cans of beer per week    Drug use: Unknown    Sexual activity: Not

## 2018-12-03 ENCOUNTER — TELEPHONE (OUTPATIENT)
Dept: PRIMARY CARE CLINIC | Age: 68
End: 2018-12-03

## 2018-12-03 DIAGNOSIS — F51.01 PRIMARY INSOMNIA: ICD-10-CM

## 2018-12-03 RX ORDER — ZOLPIDEM TARTRATE 5 MG/1
TABLET ORAL
Qty: 135 TABLET | Refills: 1 | Status: SHIPPED | OUTPATIENT
Start: 2018-12-03 | End: 2019-03-06 | Stop reason: SDUPTHER

## 2018-12-03 RX ORDER — LISINOPRIL 10 MG/1
TABLET ORAL
Qty: 225 TABLET | Refills: 1 | Status: SHIPPED | OUTPATIENT
Start: 2018-12-03 | End: 2019-03-06 | Stop reason: SDUPTHER

## 2018-12-15 PROBLEM — R05.9 COUGH: Status: RESOLVED | Noted: 2018-11-15 | Resolved: 2018-12-15

## 2018-12-27 ENCOUNTER — TELEPHONE (OUTPATIENT)
Dept: UROLOGY | Age: 68
End: 2018-12-27

## 2018-12-27 DIAGNOSIS — D64.9 ANEMIA, UNSPECIFIED TYPE: Primary | ICD-10-CM

## 2018-12-28 NOTE — TELEPHONE ENCOUNTER
Lico Reed this is the response from BRENDA please call pt and let him know. Jaron Lozano # if pt wants to call him is [phone]. Thanks! Corey Garcia,  Happy new year to you as well! I checked in with our billing folks earlier and there is nothing you need to follow up on. We (and the patient) received a denial from his insurance [facility]) for the Decipher test, but we will appeal the claim on the patients behalf. This process could take several weeks before the appeal is filed and [employer] makes a final determination. After the appeals are complete, there are instances where we are obligated to send a bill as dictated by the individual insurance plans, but we are not obligated to collect and patients will not be responsible for this amount. You can feel free to give him my cell phone and I can explain to him (or any of your patients). Let me know if you want to get on the phone tomorrow to discuss further or if you would like me to discuss with the patient. Call me whenever.   Thanks,  BRENDA

## 2018-12-28 NOTE — TELEPHONE ENCOUNTER
I called the patient and advised him of the message from Kirstin Mooney and gave him [employer] telephone number. I sent him a copy of the CBC and PSA level via mail. The patient is also requesting a iron level and a ferritin level to be ordered. Please advise. Thank you.

## 2019-01-08 ENCOUNTER — TELEPHONE (OUTPATIENT)
Dept: UROLOGY | Age: 69
End: 2019-01-08

## 2019-01-09 ENCOUNTER — TELEPHONE (OUTPATIENT)
Dept: UROLOGY | Age: 69
End: 2019-01-09

## 2019-01-15 ENCOUNTER — HOSPITAL ENCOUNTER (OUTPATIENT)
Age: 69
Discharge: HOME | End: 2019-01-15
Payer: COMMERCIAL

## 2019-01-15 DIAGNOSIS — D72.829 LEUKOCYTOSIS, UNSPECIFIED TYPE: ICD-10-CM

## 2019-01-15 DIAGNOSIS — D64.9 ANEMIA, UNSPECIFIED TYPE: ICD-10-CM

## 2019-01-15 DIAGNOSIS — C61 PROSTATE CANCER (HCC): ICD-10-CM

## 2019-01-15 LAB
ABSOLUTE EOS #: 0.31 K/UL (ref 0–0.44)
ABSOLUTE IMMATURE GRANULOCYTE: <0.03 K/UL (ref 0–0.3)
ABSOLUTE LYMPH #: 1.21 K/UL (ref 1.1–3.7)
ABSOLUTE MONO #: 0.63 K/UL (ref 0.1–1.2)
BASOPHILS # BLD: 2 % (ref 0–2)
BASOPHILS ABSOLUTE: 0.09 K/UL (ref 0–0.2)
DIFFERENTIAL TYPE: ABNORMAL
EOSINOPHILS RELATIVE PERCENT: 7 % (ref 1–4)
FERRITIN: 110 UG/L (ref 30–400)
HCT VFR BLD CALC: 46.4 % (ref 40.7–50.3)
HEMOGLOBIN: 14.4 G/DL (ref 13–17)
IMMATURE GRANULOCYTES: 0 %
IRON: 91 UG/DL (ref 59–158)
LYMPHOCYTES # BLD: 26 % (ref 24–43)
MCH RBC QN AUTO: 30.1 PG (ref 25.2–33.5)
MCHC RBC AUTO-ENTMCNC: 31 G/DL (ref 28.4–34.8)
MCV RBC AUTO: 96.9 FL (ref 82.6–102.9)
MONOCYTES # BLD: 14 % (ref 3–12)
NRBC AUTOMATED: 0 PER 100 WBC
PDW BLD-RTO: 13.6 % (ref 11.8–14.4)
PLATELET # BLD: 252 K/UL (ref 138–453)
PLATELET ESTIMATE: ABNORMAL
PMV BLD AUTO: 10.4 FL (ref 8.1–13.5)
PROSTATE SPECIFIC ANTIGEN: <0.01 UG/L
RBC # BLD: 4.79 M/UL (ref 4.21–5.77)
RBC # BLD: ABNORMAL 10*6/UL
SEG NEUTROPHILS: 51 % (ref 36–65)
SEGMENTED NEUTROPHILS ABSOLUTE COUNT: 2.33 K/UL (ref 1.5–8.1)
WBC # BLD: 4.6 K/UL (ref 3.5–11.3)
WBC # BLD: ABNORMAL 10*3/UL

## 2019-01-15 PROCEDURE — 85025 COMPLETE CBC W/AUTO DIFF WBC: CPT

## 2019-01-15 PROCEDURE — 83540 ASSAY OF IRON: CPT

## 2019-01-15 PROCEDURE — 84153 ASSAY OF PSA TOTAL: CPT

## 2019-01-15 PROCEDURE — 82728 ASSAY OF FERRITIN: CPT

## 2019-01-15 PROCEDURE — 36415 COLL VENOUS BLD VENIPUNCTURE: CPT

## 2019-01-23 ENCOUNTER — OFFICE VISIT (OUTPATIENT)
Dept: UROLOGY | Age: 69
End: 2019-01-23

## 2019-01-23 VITALS
DIASTOLIC BLOOD PRESSURE: 72 MMHG | SYSTOLIC BLOOD PRESSURE: 126 MMHG | WEIGHT: 218 LBS | BODY MASS INDEX: 33.04 KG/M2 | HEIGHT: 68 IN

## 2019-01-23 DIAGNOSIS — C61 PROSTATE CA (HCC): Primary | ICD-10-CM

## 2019-01-23 PROCEDURE — 99024 POSTOP FOLLOW-UP VISIT: CPT | Performed by: NURSE PRACTITIONER

## 2019-01-23 RX ORDER — SILDENAFIL CITRATE 20 MG/1
20 TABLET ORAL DAILY
Qty: 30 TABLET | Refills: 11 | Status: SHIPPED | OUTPATIENT
Start: 2019-01-23 | End: 2019-03-06 | Stop reason: ALTCHOICE

## 2019-02-13 ENCOUNTER — NURSE TRIAGE (OUTPATIENT)
Dept: OTHER | Facility: CLINIC | Age: 69
End: 2019-02-13

## 2019-03-06 ENCOUNTER — OFFICE VISIT (OUTPATIENT)
Dept: PRIMARY CARE CLINIC | Age: 69
End: 2019-03-06
Payer: COMMERCIAL

## 2019-03-06 VITALS
BODY MASS INDEX: 34.1 KG/M2 | DIASTOLIC BLOOD PRESSURE: 80 MMHG | OXYGEN SATURATION: 95 % | RESPIRATION RATE: 20 BRPM | WEIGHT: 225 LBS | SYSTOLIC BLOOD PRESSURE: 127 MMHG | TEMPERATURE: 98.6 F | HEIGHT: 68 IN | HEART RATE: 60 BPM

## 2019-03-06 DIAGNOSIS — Z00.00 ANNUAL PHYSICAL EXAM: ICD-10-CM

## 2019-03-06 DIAGNOSIS — F51.01 PRIMARY INSOMNIA: ICD-10-CM

## 2019-03-06 DIAGNOSIS — C61 PROSTATE CANCER (HCC): ICD-10-CM

## 2019-03-06 DIAGNOSIS — E78.00 PURE HYPERCHOLESTEROLEMIA: ICD-10-CM

## 2019-03-06 DIAGNOSIS — Z76.0 MEDICATION REFILL: ICD-10-CM

## 2019-03-06 DIAGNOSIS — M1A.9XX0 CHRONIC GOUT WITHOUT TOPHUS, UNSPECIFIED CAUSE, UNSPECIFIED SITE: ICD-10-CM

## 2019-03-06 DIAGNOSIS — I10 BENIGN ESSENTIAL HTN: Primary | Chronic | ICD-10-CM

## 2019-03-06 DIAGNOSIS — E66.09 CLASS 1 OBESITY DUE TO EXCESS CALORIES WITH BODY MASS INDEX (BMI) OF 34.0 TO 34.9 IN ADULT, UNSPECIFIED WHETHER SERIOUS COMORBIDITY PRESENT: ICD-10-CM

## 2019-03-06 PROCEDURE — 99397 PER PM REEVAL EST PAT 65+ YR: CPT | Performed by: PHYSICIAN ASSISTANT

## 2019-03-06 RX ORDER — MELOXICAM 7.5 MG/1
7.5 TABLET ORAL 2 TIMES DAILY
Qty: 180 TABLET | Refills: 3 | Status: SHIPPED | OUTPATIENT
Start: 2019-03-06 | End: 2019-03-07 | Stop reason: SDUPTHER

## 2019-03-06 RX ORDER — CLONIDINE HYDROCHLORIDE 0.1 MG/1
0.1 TABLET ORAL DAILY PRN
Qty: 90 TABLET | Refills: 3 | Status: SHIPPED | OUTPATIENT
Start: 2019-03-06 | End: 2019-03-07 | Stop reason: SDUPTHER

## 2019-03-06 RX ORDER — ALLOPURINOL 300 MG/1
TABLET ORAL
Qty: 90 TABLET | Refills: 3 | Status: SHIPPED | OUTPATIENT
Start: 2019-03-06 | End: 2019-03-07 | Stop reason: SDUPTHER

## 2019-03-06 RX ORDER — FLECAINIDE ACETATE 100 MG/1
100 TABLET ORAL 2 TIMES DAILY
Qty: 180 TABLET | Refills: 3 | Status: SHIPPED | OUTPATIENT
Start: 2019-03-06 | End: 2019-03-07 | Stop reason: SDUPTHER

## 2019-03-06 RX ORDER — ROSUVASTATIN CALCIUM 10 MG/1
10 TABLET, COATED ORAL DAILY
Qty: 90 TABLET | Refills: 3 | Status: SHIPPED | OUTPATIENT
Start: 2019-03-06 | End: 2019-03-07 | Stop reason: SDUPTHER

## 2019-03-06 RX ORDER — AMLODIPINE BESYLATE 5 MG/1
5 TABLET ORAL DAILY
Qty: 90 TABLET | Refills: 3 | Status: SHIPPED | OUTPATIENT
Start: 2019-03-06 | End: 2019-03-07 | Stop reason: SDUPTHER

## 2019-03-06 RX ORDER — ZOLPIDEM TARTRATE 5 MG/1
TABLET ORAL
Qty: 135 TABLET | Refills: 1 | Status: SHIPPED | OUTPATIENT
Start: 2019-03-06 | End: 2019-03-07 | Stop reason: SDUPTHER

## 2019-03-06 RX ORDER — FUROSEMIDE 20 MG/1
20 TABLET ORAL DAILY
Qty: 90 TABLET | Refills: 3 | Status: SHIPPED | OUTPATIENT
Start: 2019-03-06 | End: 2019-03-07 | Stop reason: SDUPTHER

## 2019-03-06 RX ORDER — LISINOPRIL 10 MG/1
TABLET ORAL
Qty: 225 TABLET | Refills: 1 | Status: SHIPPED | OUTPATIENT
Start: 2019-03-06 | End: 2019-03-07 | Stop reason: SDUPTHER

## 2019-03-06 ASSESSMENT — PATIENT HEALTH QUESTIONNAIRE - PHQ9
SUM OF ALL RESPONSES TO PHQ9 QUESTIONS 1 & 2: 0
1. LITTLE INTEREST OR PLEASURE IN DOING THINGS: 0
SUM OF ALL RESPONSES TO PHQ QUESTIONS 1-9: 0
2. FEELING DOWN, DEPRESSED OR HOPELESS: 0
SUM OF ALL RESPONSES TO PHQ QUESTIONS 1-9: 0

## 2019-03-06 ASSESSMENT — ENCOUNTER SYMPTOMS
SHORTNESS OF BREATH: 1
BACK PAIN: 1

## 2019-03-07 ENCOUNTER — TELEPHONE (OUTPATIENT)
Dept: PRIMARY CARE CLINIC | Age: 69
End: 2019-03-07

## 2019-03-07 DIAGNOSIS — E78.00 PURE HYPERCHOLESTEROLEMIA: ICD-10-CM

## 2019-03-07 DIAGNOSIS — Z76.0 MEDICATION REFILL: ICD-10-CM

## 2019-03-07 DIAGNOSIS — F51.01 PRIMARY INSOMNIA: ICD-10-CM

## 2019-03-07 DIAGNOSIS — M1A.9XX0 CHRONIC GOUT WITHOUT TOPHUS, UNSPECIFIED CAUSE, UNSPECIFIED SITE: ICD-10-CM

## 2019-03-07 DIAGNOSIS — I10 BENIGN ESSENTIAL HTN: Chronic | ICD-10-CM

## 2019-03-07 DIAGNOSIS — I10 ESSENTIAL HYPERTENSION: ICD-10-CM

## 2019-03-07 RX ORDER — CLONIDINE HYDROCHLORIDE 0.1 MG/1
0.1 TABLET ORAL DAILY PRN
Qty: 90 TABLET | Refills: 3 | Status: SHIPPED | OUTPATIENT
Start: 2019-03-07 | End: 2020-03-13

## 2019-03-07 RX ORDER — MELOXICAM 7.5 MG/1
7.5 TABLET ORAL 2 TIMES DAILY
Qty: 180 TABLET | Refills: 3 | Status: SHIPPED | OUTPATIENT
Start: 2019-03-07 | End: 2020-03-04 | Stop reason: SDUPTHER

## 2019-03-07 RX ORDER — FLECAINIDE ACETATE 100 MG/1
100 TABLET ORAL 2 TIMES DAILY
Qty: 180 TABLET | Refills: 3 | Status: ON HOLD
Start: 2019-03-07 | End: 2020-02-27 | Stop reason: HOSPADM

## 2019-03-07 RX ORDER — LISINOPRIL 10 MG/1
TABLET ORAL
Qty: 225 TABLET | Refills: 1 | Status: SHIPPED | OUTPATIENT
Start: 2019-03-07 | End: 2019-04-11 | Stop reason: SDUPTHER

## 2019-03-07 RX ORDER — AMLODIPINE BESYLATE 5 MG/1
5 TABLET ORAL DAILY
Qty: 90 TABLET | Refills: 3 | Status: SHIPPED | OUTPATIENT
Start: 2019-03-07 | End: 2019-09-25 | Stop reason: SDUPTHER

## 2019-03-07 RX ORDER — FUROSEMIDE 20 MG/1
20 TABLET ORAL DAILY
Qty: 90 TABLET | Refills: 3 | Status: SHIPPED | OUTPATIENT
Start: 2019-03-07 | End: 2020-03-04

## 2019-03-07 RX ORDER — ZOLPIDEM TARTRATE 5 MG/1
TABLET ORAL
Qty: 135 TABLET | Refills: 1 | Status: SHIPPED | OUTPATIENT
Start: 2019-03-07 | End: 2019-09-25 | Stop reason: SDUPTHER

## 2019-03-07 RX ORDER — ALLOPURINOL 300 MG/1
TABLET ORAL
Qty: 90 TABLET | Refills: 3 | Status: SHIPPED | OUTPATIENT
Start: 2019-03-07 | End: 2020-03-04 | Stop reason: SDUPTHER

## 2019-03-07 RX ORDER — ROSUVASTATIN CALCIUM 10 MG/1
10 TABLET, COATED ORAL DAILY
Qty: 90 TABLET | Refills: 3 | Status: SHIPPED | OUTPATIENT
Start: 2019-03-07 | End: 2020-03-04 | Stop reason: SDUPTHER

## 2019-03-09 PROBLEM — E66.09 CLASS 1 OBESITY DUE TO EXCESS CALORIES WITH BODY MASS INDEX (BMI) OF 34.0 TO 34.9 IN ADULT: Status: ACTIVE | Noted: 2019-03-09

## 2019-03-09 ASSESSMENT — ENCOUNTER SYMPTOMS
COUGH: 0
EYE PAIN: 0
DIARRHEA: 0
RHINORRHEA: 0
CONSTIPATION: 0
VOMITING: 0
ABDOMINAL PAIN: 0
SORE THROAT: 0
NAUSEA: 0

## 2019-03-26 ENCOUNTER — HOSPITAL ENCOUNTER (OUTPATIENT)
Age: 69
Discharge: HOME | End: 2019-03-26
Payer: COMMERCIAL

## 2019-03-26 ENCOUNTER — OFFICE VISIT (OUTPATIENT)
Dept: ONCOLOGY | Age: 69
End: 2019-03-26
Payer: COMMERCIAL

## 2019-03-26 VITALS
BODY MASS INDEX: 35.09 KG/M2 | TEMPERATURE: 98.2 F | DIASTOLIC BLOOD PRESSURE: 88 MMHG | WEIGHT: 223.6 LBS | SYSTOLIC BLOOD PRESSURE: 135 MMHG | HEIGHT: 67 IN | HEART RATE: 65 BPM

## 2019-03-26 DIAGNOSIS — C61 PROSTATE CANCER (HCC): Primary | ICD-10-CM

## 2019-03-26 DIAGNOSIS — C61 PROSTATE CANCER (HCC): ICD-10-CM

## 2019-03-26 DIAGNOSIS — E78.00 PURE HYPERCHOLESTEROLEMIA: ICD-10-CM

## 2019-03-26 LAB
CHOLESTEROL/HDL RATIO: 2.8
CHOLESTEROL: 166 MG/DL
HDLC SERPL-MCNC: 59 MG/DL
LDL CHOLESTEROL: 87 MG/DL (ref 0–130)
TESTOSTERONE TOTAL: 301 NG/DL (ref 220–1000)
TRIGL SERPL-MCNC: 99 MG/DL
VLDLC SERPL CALC-MCNC: NORMAL MG/DL (ref 1–30)

## 2019-03-26 PROCEDURE — 83036 HEMOGLOBIN GLYCOSYLATED A1C: CPT

## 2019-03-26 PROCEDURE — 84153 ASSAY OF PSA TOTAL: CPT

## 2019-03-26 PROCEDURE — 84403 ASSAY OF TOTAL TESTOSTERONE: CPT

## 2019-03-26 PROCEDURE — 36415 COLL VENOUS BLD VENIPUNCTURE: CPT

## 2019-03-26 PROCEDURE — 99244 OFF/OP CNSLTJ NEW/EST MOD 40: CPT | Performed by: INTERNAL MEDICINE

## 2019-03-26 PROCEDURE — 80061 LIPID PANEL: CPT

## 2019-03-27 ENCOUNTER — TELEPHONE (OUTPATIENT)
Dept: ONCOLOGY | Age: 69
End: 2019-03-27

## 2019-03-27 LAB
ESTIMATED AVERAGE GLUCOSE: 117 MG/DL
HBA1C MFR BLD: 5.7 % (ref 4–6)
PROSTATE SPECIFIC ANTIGEN: <0.01 UG/L

## 2019-03-29 ENCOUNTER — HOSPITAL ENCOUNTER (OUTPATIENT)
Dept: RADIATION ONCOLOGY | Age: 69
Discharge: HOME | End: 2019-03-29
Payer: COMMERCIAL

## 2019-03-29 VITALS
RESPIRATION RATE: 14 BRPM | TEMPERATURE: 98.1 F | SYSTOLIC BLOOD PRESSURE: 139 MMHG | OXYGEN SATURATION: 98 % | WEIGHT: 224 LBS | DIASTOLIC BLOOD PRESSURE: 85 MMHG | BODY MASS INDEX: 35.16 KG/M2 | HEIGHT: 67 IN | HEART RATE: 53 BPM

## 2019-03-29 DIAGNOSIS — C61 PROSTATE CANCER (HCC): ICD-10-CM

## 2019-03-29 PROCEDURE — 99213 OFFICE O/P EST LOW 20 MIN: CPT | Performed by: RADIOLOGY

## 2019-04-03 ENCOUNTER — HOSPITAL ENCOUNTER (OUTPATIENT)
Dept: RADIATION ONCOLOGY | Age: 69
Discharge: HOME | End: 2019-04-03
Attending: RADIOLOGY
Payer: COMMERCIAL

## 2019-04-03 ENCOUNTER — HOSPITAL ENCOUNTER (OUTPATIENT)
Dept: INFUSION THERAPY | Age: 69
Discharge: HOME | End: 2019-04-03
Payer: COMMERCIAL

## 2019-04-03 ENCOUNTER — HOSPITAL ENCOUNTER (OUTPATIENT)
Age: 69
Discharge: HOME | End: 2019-04-03
Payer: COMMERCIAL

## 2019-04-03 DIAGNOSIS — C61 PROSTATE CANCER (HCC): Primary | ICD-10-CM

## 2019-04-03 DIAGNOSIS — C61 PROSTATE CANCER (HCC): ICD-10-CM

## 2019-04-03 LAB
ABSOLUTE EOS #: 0.23 K/UL (ref 0–0.44)
ABSOLUTE IMMATURE GRANULOCYTE: 0.04 K/UL (ref 0–0.3)
ABSOLUTE LYMPH #: 1.09 K/UL (ref 1.1–3.7)
ABSOLUTE MONO #: 0.72 K/UL (ref 0.1–1.2)
BASOPHILS # BLD: 1 % (ref 0–2)
BASOPHILS ABSOLUTE: 0.08 K/UL (ref 0–0.2)
CREAT SERPL-MCNC: 0.96 MG/DL (ref 0.7–1.2)
DIFFERENTIAL TYPE: ABNORMAL
EOSINOPHILS RELATIVE PERCENT: 4 % (ref 1–4)
GFR AFRICAN AMERICAN: >60 ML/MIN
GFR NON-AFRICAN AMERICAN: >60 ML/MIN
GFR SERPL CREATININE-BSD FRML MDRD: NORMAL ML/MIN/{1.73_M2}
GFR SERPL CREATININE-BSD FRML MDRD: NORMAL ML/MIN/{1.73_M2}
HCT VFR BLD CALC: 43 % (ref 40.7–50.3)
HEMOGLOBIN: 14.3 G/DL (ref 13–17)
IMMATURE GRANULOCYTES: 1 %
LYMPHOCYTES # BLD: 20 % (ref 24–43)
MCH RBC QN AUTO: 30.8 PG (ref 25.2–33.5)
MCHC RBC AUTO-ENTMCNC: 33.3 G/DL (ref 28.4–34.8)
MCV RBC AUTO: 92.7 FL (ref 82.6–102.9)
MONOCYTES # BLD: 13 % (ref 3–12)
NRBC AUTOMATED: 0 PER 100 WBC
PDW BLD-RTO: 14.5 % (ref 11.8–14.4)
PLATELET # BLD: 219 K/UL (ref 138–453)
PLATELET ESTIMATE: ABNORMAL
PMV BLD AUTO: 10.5 FL (ref 8.1–13.5)
RBC # BLD: 4.64 M/UL (ref 4.21–5.77)
RBC # BLD: ABNORMAL 10*6/UL
SEG NEUTROPHILS: 61 % (ref 36–65)
SEGMENTED NEUTROPHILS ABSOLUTE COUNT: 3.36 K/UL (ref 1.5–8.1)
WBC # BLD: 5.5 K/UL (ref 3.5–11.3)
WBC # BLD: ABNORMAL 10*3/UL

## 2019-04-03 PROCEDURE — 36415 COLL VENOUS BLD VENIPUNCTURE: CPT

## 2019-04-03 PROCEDURE — 77334 RADIATION TREATMENT AID(S): CPT | Performed by: RADIOLOGY

## 2019-04-03 PROCEDURE — 77290 THER RAD SIMULAJ FIELD CPLX: CPT | Performed by: RADIOLOGY

## 2019-04-03 PROCEDURE — 85025 COMPLETE CBC W/AUTO DIFF WBC: CPT

## 2019-04-03 PROCEDURE — 96402 CHEMO HORMON ANTINEOPL SQ/IM: CPT

## 2019-04-03 PROCEDURE — 82565 ASSAY OF CREATININE: CPT

## 2019-04-03 NOTE — PROGRESS NOTES
Pt here today for teach and simulation scan. Radiation and You information provided along with additional education regarding radiation therapy and what to expect. Pt verbalizes understanding and all questions answered to the best of my knowledge. Samples of aquaphor and community resource information provided. Pt escorted to CT room without any difficulty. IV contrast checklist reviewed with pt. IV started,contrast given. Pt tolerated well. IV d/c and tip of cath was intact.

## 2019-04-09 ENCOUNTER — HOSPITAL ENCOUNTER (OUTPATIENT)
Dept: RADIATION ONCOLOGY | Age: 69
Discharge: HOME | End: 2019-04-09
Attending: RADIOLOGY
Payer: COMMERCIAL

## 2019-04-09 PROCEDURE — 77301 RADIOTHERAPY DOSE PLAN IMRT: CPT | Performed by: RADIOLOGY

## 2019-04-09 PROCEDURE — 77338 DESIGN MLC DEVICE FOR IMRT: CPT | Performed by: RADIOLOGY

## 2019-04-09 PROCEDURE — 77300 RADIATION THERAPY DOSE PLAN: CPT | Performed by: RADIOLOGY

## 2019-04-11 ENCOUNTER — HOSPITAL ENCOUNTER (OUTPATIENT)
Dept: RADIATION ONCOLOGY | Age: 69
Discharge: HOME | End: 2019-04-11
Attending: RADIOLOGY
Payer: COMMERCIAL

## 2019-04-11 DIAGNOSIS — I10 ESSENTIAL HYPERTENSION: ICD-10-CM

## 2019-04-11 PROCEDURE — 77385 HC NTSTY MODUL RAD TX DLVR SMPL: CPT | Performed by: RADIOLOGY

## 2019-04-11 NOTE — TELEPHONE ENCOUNTER
Pt called in requesting a refill on his lisinopril. He states that he has altered the way he has been taking it and it seems to be improving his bp. He is now taking 20 mg in the morning and 15 mg in the evening and is requesting 90- 10 mg tabs be sent in to the mail order pharmacy so he can finish out this month.      Health Maintenance   Topic Date Due    Colon cancer screen colonoscopy  08/30/2000    Potassium monitoring  11/17/2019    A1C test (Diabetic or Prediabetic)  03/26/2020    Creatinine monitoring  04/03/2020    Lipid screen  03/26/2024    DTaP/Tdap/Td vaccine (2 - Td) 05/10/2027    Flu vaccine  Completed    Shingles Vaccine  Completed    Pneumococcal 65+ years Vaccine  Completed    Hepatitis C screen  Completed             (applicable per patient's age: Cancer Screenings, Depression Screening, Fall Risk Screening, Immunizations)    Hemoglobin A1C (%)   Date Value   03/26/2019 5.7     LDL Cholesterol (mg/dL)   Date Value   03/26/2019 87     AST (U/L)   Date Value   11/17/2018 65 (H)     ALT (U/L)   Date Value   11/17/2018 67 (H)     BUN (mg/dL)   Date Value   11/17/2018 7 (L)      (goal A1C is < 7)   (goal LDL is <100) need 30-50% reduction from baseline     BP Readings from Last 3 Encounters:   03/29/19 139/85   03/26/19 135/88   03/06/19 127/80    (goal /80)      All Future Testing planned in [facility]:  Lab Frequency Next Occurrence   MRI PROSTATE W WO CONTRAST Once 06/01/2019   POCT Fecal Immunochemical Test (FIT) Once 06/26/2018   Creatinine, Serum Once 06/12/2019   PSA Prostatic Specific Antigen Once 04/23/2019   Creatinine, Serum Once 04/10/2019   PSA, Diagnostic         Next Visit Date:  Future Appointments   Date Time Provider Carol Stephenson   4/12/2019  4:30 PM STVZ PBURG VERSA STVZ PB RON[facility] None   4/15/2019  4:30 PM STVZ PBURG VERSA STVZ PB RONC None   4/16/2019  4:30 PM STVZ PBURG VERSA STVZ PB RONC None   4/17/2019  4:30 PM STVZ PBURG VERSA STVZ PB RONC None   4/18/2019  4:30 PM STVZ PBURG VERSA STVZ PB RONC None   4/19/2019  4:30 PM STVZ PBURG VERSA STVZ PB RONC None   4/22/2019  4:30 PM STVZ PBURG VERSA STVZ PB RONC None   4/23/2019  4:30 PM STVZ PBURG VERSA STVZ PB RONC None   4/24/2019  3:00 PM Dennis Paul, APRN - CNP [facility] [address] [facility] [address]   4/24/2019  4:30 PM STVZ PBURG VERSA STVZ PB RONC None   4/25/2019  4:30 PM STVZ PBURG VERSA STVZ PB RONC None   4/26/2019  4:30 PM STVZ PBURG VERSA STVZ PB RONC None   4/29/2019  4:30 PM STVZ PBURG VERSA STVZ PB RONC None   4/30/2019  4:30 PM STVZ PBURG VERSA STVZ PB RONC None   5/1/2019  2:30 PM STV PB MED ONC CHAIR 07 STVZ PB MONC None   5/1/2019  4:30 PM STVZ PBURG VERSA STVZ PB RONC None   5/2/2019  4:30 PM STVZ PBURG VERSA STVZ PB RONC None   5/3/2019  4:30 PM STVZ PBURG VERSA STVZ PB RONC None   5/6/2019  4:30 PM STVZ PBURG VERSA STVZ PB RONC None   5/7/2019  4:30 PM STVZ PBURG VERSA STVZ PB RONC None   5/8/2019  4:30 PM STVZ PBURG VERSA STVZ PB RONC None   5/9/2019  4:30 PM STVZ PBURG VERSA STVZ PB RONC None   5/10/2019  4:30 PM STVZ PBURG VERSA STVZ PB RONC None   5/13/2019  4:30 PM STVZ PBURG VERSA STVZ PB RONC None   5/14/2019  4:30 PM STVZ PBURG VERSA STVZ PB RONC None   5/15/2019  4:30 PM STVZ PBURG VERSA STVZ PB RONC None   5/16/2019  4:30 PM STVZ PBURG VERSA STVZ PB RONC None   5/17/2019  4:30 PM STVZ PBURG VERSA STVZ PB RONC None   5/20/2019  4:30 PM STVZ PBURG VERSA STVZ PB RONC None   5/21/2019  4:30 PM STVZ PBURG VERSA STVZ PB RONC None   5/22/2019  4:30 PM STVZ PBURG VERSA STVZ PB RONC None   5/23/2019  4:30 PM STVZ PBURG VERSA STVZ PB RONC None   5/24/2019  4:30 PM STVZ PBURG VERSA STVZ PB RONC None   5/28/2019  4:30 PM STVZ PBURG VERSA STVZ PB RONC None   5/29/2019  2:30 PM STV PB MED ONC CHAIR 18 STVZ PB MONC None   5/29/2019  4:30 PM STVZ PBURG VERSA STVZ PB RONC None   5/30/2019  4:30 PM STVZ PBURG VERSA STVZ PB RONC None   5/31/2019  4:30 PM STVZ PBURG VERSA STVZ PB RONC None   6/3/2019  4:30 PM STVZ PBURG VERSA STVZ PB RONC None   6/26/2019  2:30 PM STV PB MED ONC CHAIR 04 STVZ PB MONC None   9/26/2019  4:00 PM Aureliano Hernandez MD URG CANCER [facility]   3/4/2020 11:40 AM Meryl Howard PA-C ST V WALK IN [facility]            Patient Active Problem List:     Need for prophylactic vaccination and inoculation against varicella     Benign essential HTN     Frequent PVCs     Primary insomnia     Prostate cancer (HCC)     Abdominal pain     Leukocytosis     Elevated CPK     Elevated PSA measurement     Gout     Hyperlipidemia     Hypertension     Osteoarthrosis     Rising PSA level     Class 1 obesity due to excess calories with body mass index (BMI) of 34.0 to 34.9 in adult

## 2019-04-12 ENCOUNTER — APPOINTMENT (OUTPATIENT)
Dept: RADIATION ONCOLOGY | Age: 69
End: 2019-04-12
Attending: RADIOLOGY
Payer: COMMERCIAL

## 2019-04-12 PROCEDURE — 77385 HC NTSTY MODUL RAD TX DLVR SMPL: CPT | Performed by: RADIOLOGY

## 2019-04-15 ENCOUNTER — APPOINTMENT (OUTPATIENT)
Dept: RADIATION ONCOLOGY | Age: 69
End: 2019-04-15
Attending: RADIOLOGY
Payer: COMMERCIAL

## 2019-04-15 ENCOUNTER — HOSPITAL ENCOUNTER (OUTPATIENT)
Dept: RADIATION ONCOLOGY | Age: 69
Discharge: HOME | End: 2019-04-15
Attending: RADIOLOGY
Payer: COMMERCIAL

## 2019-04-15 VITALS
HEART RATE: 64 BPM | TEMPERATURE: 98 F | OXYGEN SATURATION: 98 % | DIASTOLIC BLOOD PRESSURE: 102 MMHG | RESPIRATION RATE: 20 BRPM | SYSTOLIC BLOOD PRESSURE: 166 MMHG

## 2019-04-15 PROCEDURE — 77385 HC NTSTY MODUL RAD TX DLVR SMPL: CPT | Performed by: RADIOLOGY

## 2019-04-15 NOTE — PROGRESS NOTES
Janell Tidwell  4/15/2019  Wt Readings from Last 3 Encounters:   03/29/19 224 lb (101.6 kg)   03/26/19 223 lb 9.6 oz (101.4 kg)   03/06/19 225 lb (102.1 kg)     There is no height or weight on file to calculate BMI. Treatment Area:pelvis     Patient was seen today for weekly visit. Comfort Alteration    Fatigue: Mild    Nutritional Alteration  Anorexia: No  Nausea: No  Vomiting: No     Elimination Alterations  Constipation: no  Diarrhea:  no  Urinary Frequency/Urgency: No  Urinary Retention: No  Dysuria: No  Urinary Incontinence: No  Proctitis: No  Nocturia: No #/night: 2     Skin Alteration   Sensation:intact     Radiation Dermatitis:  None     Emotional  Coping: effective    Sexuality Alteration  absent    Injury, potential bleeding or infection: none     Lab Results   Component Value Date    WBC 5.5 04/03/2019     04/03/2019         BP (!) 166/102   Pulse 64   Temp 98 °F (36.7 °C)   Resp 20   SpO2 98%       Assessment/Plan: Dr Lisa Acharya notified and examined pt. No new orders received.      Jose Swenson

## 2019-04-15 NOTE — PROGRESS NOTES
Date Value Ref Range Status   04/03/2019 5.5 3.5 - 11.3 k/uL Final     Segs Absolute   Date Value Ref Range Status   04/03/2019 3.36 1.50 - 8.10 k/uL Final     Hemoglobin   Date Value Ref Range Status   04/03/2019 14.3 13.0 - 17.0 g/dL Final     Platelets   Date Value Ref Range Status   04/03/2019 219 138 - 453 k/uL Final   [unfilled]  PSA   Date Value Ref Range Status   03/26/2019 <0.01 <4.1 ug/L Final     Comment:     The Roche \"ECLIA\" assay is used. Results obtained with different assay methods cannot be   used interchangeably. 01/15/2019 <0.01 <4.1 ug/L Final     Comment:     The Roche \"ECLIA\" assay is used. Results obtained with different assay methods   cannot be used interchangeably. 11/05/2018 3.85 <4.1 ug/L Final     Comment:     The Roche \"ECLIA\" assay is used. Results obtained with different assay methods   cannot be used interchangeably.      Medications:    Current Outpatient Medications:     allopurinol (ZYLOPRIM) 300 MG tablet, Take 3/4 tablet daily, Disp: 90 tablet, Rfl: 3    amLODIPine (NORVASC) 5 MG tablet, Take 1 tablet by mouth daily, Disp: 90 tablet, Rfl: 3    cloNIDine (CATAPRES) 0.1 MG tablet, Take 1 tablet by mouth daily as needed for High Blood Pressure (150/90 or above), Disp: 90 tablet, Rfl: 3    flecainide (TAMBOCOR) 100 MG tablet, Take 1 tablet by mouth 2 times daily, Disp: 180 tablet, Rfl: 3    furosemide (LASIX) 20 MG tablet, Take 1 tablet by mouth daily, Disp: 90 tablet, Rfl: 3    lisinopril (PRINIVIL;ZESTRIL) 10 MG tablet, Patient states he Takes 1 pill in the am and 1 and 1/2 pills in the PM, Disp: 225 tablet, Rfl: 1    meloxicam (MOBIC) 7.5 MG tablet, Take 1 tablet by mouth 2 times daily, Disp: 180 tablet, Rfl: 3    rosuvastatin (CRESTOR) 10 MG tablet, Take 1 tablet by mouth daily, Disp: 90 tablet, Rfl: 3    zolpidem (AMBIEN) 5 MG tablet, Indications: pt take 1 1/2 a day Patient states that he takes 1.5 pills every Night, Disp: 135 tablet, Rfl: 1    FLUZONE Prescribed:  New Prescriptions    No medications on file       Other Orders Placed:  No orders of the defined types were placed in this encounter.

## 2019-04-16 ENCOUNTER — HOSPITAL ENCOUNTER (OUTPATIENT)
Dept: RADIATION ONCOLOGY | Age: 69
Discharge: HOME | End: 2019-04-16
Attending: RADIOLOGY
Payer: COMMERCIAL

## 2019-04-16 VITALS — WEIGHT: 225.2 LBS | BODY MASS INDEX: 35.27 KG/M2

## 2019-04-16 PROCEDURE — 77385 HC NTSTY MODUL RAD TX DLVR SMPL: CPT | Performed by: RADIOLOGY

## 2019-04-17 ENCOUNTER — APPOINTMENT (OUTPATIENT)
Dept: RADIATION ONCOLOGY | Age: 69
End: 2019-04-17
Attending: RADIOLOGY
Payer: COMMERCIAL

## 2019-04-17 PROCEDURE — 77385 HC NTSTY MODUL RAD TX DLVR SMPL: CPT | Performed by: RADIOLOGY

## 2019-04-17 PROCEDURE — 77336 RADIATION PHYSICS CONSULT: CPT | Performed by: RADIOLOGY

## 2019-04-17 RX ORDER — LISINOPRIL 10 MG/1
TABLET ORAL
Qty: 90 TABLET | Refills: 3 | Status: SHIPPED | OUTPATIENT
Start: 2019-04-17 | End: 2019-09-25 | Stop reason: SDUPTHER

## 2019-04-18 ENCOUNTER — APPOINTMENT (OUTPATIENT)
Dept: RADIATION ONCOLOGY | Age: 69
End: 2019-04-18
Attending: RADIOLOGY
Payer: COMMERCIAL

## 2019-04-18 PROCEDURE — 77385 HC NTSTY MODUL RAD TX DLVR SMPL: CPT | Performed by: RADIOLOGY

## 2019-04-19 ENCOUNTER — HOSPITAL ENCOUNTER (OUTPATIENT)
Dept: RADIATION ONCOLOGY | Age: 69
Discharge: HOME | End: 2019-04-19
Attending: RADIOLOGY
Payer: COMMERCIAL

## 2019-04-19 PROCEDURE — 77385 HC NTSTY MODUL RAD TX DLVR SMPL: CPT | Performed by: RADIOLOGY

## 2019-04-22 ENCOUNTER — TELEPHONE (OUTPATIENT)
Dept: INTERNAL MEDICINE | Age: 69
End: 2019-04-22

## 2019-04-22 ENCOUNTER — HOSPITAL ENCOUNTER (OUTPATIENT)
Dept: RADIATION ONCOLOGY | Age: 69
Discharge: HOME | End: 2019-04-22
Attending: RADIOLOGY
Payer: COMMERCIAL

## 2019-04-22 ENCOUNTER — OFFICE VISIT (OUTPATIENT)
Dept: INTERNAL MEDICINE | Age: 69
End: 2019-04-22
Payer: COMMERCIAL

## 2019-04-22 ENCOUNTER — TELEPHONE (OUTPATIENT)
Dept: PHARMACY | Age: 69
End: 2019-04-22

## 2019-04-22 VITALS
RESPIRATION RATE: 16 BRPM | SYSTOLIC BLOOD PRESSURE: 161 MMHG | TEMPERATURE: 97.8 F | HEART RATE: 63 BPM | DIASTOLIC BLOOD PRESSURE: 94 MMHG | OXYGEN SATURATION: 99 %

## 2019-04-22 VITALS
WEIGHT: 222 LBS | DIASTOLIC BLOOD PRESSURE: 100 MMHG | HEART RATE: 72 BPM | HEIGHT: 67 IN | BODY MASS INDEX: 34.84 KG/M2 | SYSTOLIC BLOOD PRESSURE: 159 MMHG

## 2019-04-22 DIAGNOSIS — C61 PROSTATE CANCER (HCC): Primary | ICD-10-CM

## 2019-04-22 PROBLEM — Z23 NEED FOR PROPHYLACTIC VACCINATION AND INOCULATION AGAINST VARICELLA: Status: RESOLVED | Noted: 2018-06-01 | Resolved: 2019-04-22

## 2019-04-22 PROBLEM — E66.09 CLASS 1 OBESITY DUE TO EXCESS CALORIES WITH BODY MASS INDEX (BMI) OF 34.0 TO 34.9 IN ADULT: Status: RESOLVED | Noted: 2019-03-09 | Resolved: 2019-04-22

## 2019-04-22 PROBLEM — R97.20 ELEVATED PSA MEASUREMENT: Status: RESOLVED | Noted: 2018-03-23 | Resolved: 2019-04-22

## 2019-04-22 PROBLEM — R10.9 ABDOMINAL PAIN: Status: RESOLVED | Noted: 2018-11-14 | Resolved: 2019-04-22

## 2019-04-22 PROBLEM — D72.829 LEUKOCYTOSIS: Status: RESOLVED | Noted: 2018-11-15 | Resolved: 2019-04-22

## 2019-04-22 PROBLEM — I49.3 FREQUENT PVCS: Status: RESOLVED | Noted: 2018-06-01 | Resolved: 2019-04-22

## 2019-04-22 PROBLEM — R97.20 RISING PSA LEVEL: Status: RESOLVED | Noted: 2018-05-10 | Resolved: 2019-04-22

## 2019-04-22 PROCEDURE — PHARMNEW PHARMACY SPECIALTY VISIT NEW: Performed by: INTERNAL MEDICINE

## 2019-04-22 PROCEDURE — 77385 HC NTSTY MODUL RAD TX DLVR SMPL: CPT | Performed by: RADIOLOGY

## 2019-04-22 NOTE — PROGRESS NOTES
Visit Information    Have you changed or started any medications since your last visit including any over-the-counter medicines, vitamins, or herbal medicines? yes - lisinopril dose   Have you stopped taking any of your medications? Is so, why? -  no  Are you having any side effects from any of your medications? - no    Have you seen any other physician or provider since your last visit?  no   Have you had any other diagnostic tests since your last visit? yes - labs and dexa scan   Have you been seen in the emergency room and/or had an admission in a hospital since we last saw you?  no   Have you had your routine dental cleaning in the past 6 months?  yes -      Do you have an active MyChart account? If no, what is the barrier?   Yes    Patient Care Team:  Phyllis Camarena PA-C as PCP - General (Physician Assistant)  Davidson Ye MD as PCP - S Attributed Provider  Cornell Hillman MD as Consulting Physician (Infectious Diseases)  Rojelio Bergman RN as Nurse Navigator (Oncology)    Medical History Review  Past Medical, Family, and Social History reviewed and does not contribute to the patient presenting condition    Health Maintenance   Topic Date Due    Colon cancer screen colonoscopy  08/30/2000    Potassium monitoring  11/17/2019    A1C test (Diabetic or Prediabetic)  03/26/2020    Creatinine monitoring  04/03/2020    Lipid screen  03/26/2024    DTaP/Tdap/Td vaccine (2 - Td) 05/10/2027    Flu vaccine  Completed    Shingles Vaccine  Completed    Pneumococcal 65+ years Vaccine  Completed    Hepatitis C screen  Completed

## 2019-04-22 NOTE — PROGRESS NOTES
Arthur Hernández  4/22/2019  Wt Readings from Last 3 Encounters:   04/22/19 222 lb (100.7 kg)   04/15/19 225 lb 3.2 oz (102.2 kg)   03/29/19 224 lb (101.6 kg)     There is no height or weight on file to calculate BMI. Treatment Area:prostate     Patient was seen today for weekly visit.      Comfort Alteration    Fatigue: Mild    Nutritional Alteration  Anorexia: No  Nausea: No  Vomiting: No     Elimination Alterations  Constipation: no  Diarrhea:  Yes   Urinary Frequency/Urgency: Yes  Urinary Retention: No  Dysuria: No  Urinary Incontinence: No  Proctitis: No  Nocturia: Yes #/night: 2     Skin Alteration   Sensation:intact     Radiation Dermatitis: none     Emotional  Coping: effective    Sexuality Alteration  absent    Injury, potential bleeding or infection: none     Lab Results   Component Value Date    WBC 5.5 04/03/2019     04/03/2019         BP (!) 161/94   Pulse 63   Temp 97.8 °F (36.6 °C) (Oral)   Resp 16   SpO2 99%       Assessment/Plan:    Williams Paulino

## 2019-04-22 NOTE — TELEPHONE ENCOUNTER
Medication Management Service    Date of Chart Review: 4/22/2019  Patient's Name: María Limb YOB: 1950            ______________________________________________________________________    Reason for visit: Pharmacy chart review completed prior to Specialty Medication Service physician visit. Medication list updated. Specialty Medication: Lupron Depot 7.5 mg   Frequency: Every 4 weeks  Indication: Prostate Cancer  Initially Diagnosed: November 2018  Additional Therapy:   · Radiation   Previous Therapy:   · Prostatectomy    Specialist: Dr. Bandar Minor  Specialist Progress Note Available: Yes  Last Specialist Visit: 3/26/19   ______________________________________________________________________  Medical History Survey:  Specialty Medication Start Date: April 2019  Appropriate Dose: Yes  ______________________________________________________________________  Current Medications and Allergies:     Allergies   Allergen Reactions    Phenothiazines Other (See Comments)     Neuromotor reaction called a phenothiazine reaction    Sulfa Antibiotics Other (See Comments)    Prochlorperazine Edisylate Other (See Comments)     Lose control         Current Outpatient Medications   Medication Sig Dispense Refill    leuprolide (LUPRON DEPOT, 1-MONTH,) 7.5 MG injection Inject 7.5 mg into the muscle every 28 days      lisinopril (PRINIVIL;ZESTRIL) 10 MG tablet Takes 2 pills in the am and 1 and 1/2 pills in the PM 90 tablet 3    allopurinol (ZYLOPRIM) 300 MG tablet Take 3/4 tablet daily 90 tablet 3    amLODIPine (NORVASC) 5 MG tablet Take 1 tablet by mouth daily 90 tablet 3    cloNIDine (CATAPRES) 0.1 MG tablet Take 1 tablet by mouth daily as needed for High Blood Pressure (150/90 or above) 90 tablet 3    flecainide (TAMBOCOR) 100 MG tablet Take 1 tablet by mouth 2 times daily 180 tablet 3    furosemide (LASIX) 20 MG tablet Take 1 tablet by mouth daily 90 tablet 3    meloxicam (MOBIC) 7.5 MG tablet Take 1 tablet by mouth 2 times daily 180 tablet 3    rosuvastatin (CRESTOR) 10 MG tablet Take 1 tablet by mouth daily 90 tablet 3    zolpidem (AMBIEN) 5 MG tablet Indications: pt take 1 1/2 a day Patient states that he takes 1.5 pills every Night 135 tablet 1    Multiple Vitamin (MULTI-VITAMIN) TABS Take 1 tablet by mouth      nitroGLYCERIN (NITROSTAT) 0.4 MG SL tablet Place 0.4 mg under the tongue every 5 minutes as needed        No current facility-administered medications for this visit.      _____________________________________________________________________  Drug Interactions:  No clinically significant interactions identified via [employer] Interaction Analysis as category D or higher.  _____________________________________________________________________  Renal Dosing:  Creatinine Clearance: Estimated Creatinine Clearance: 84 mL/min (based on SCr of 0.96 mg/dL). No renal adjustments necessary. _____________________________________________________________________  Labs:  CBC:   Lab Results   Component Value Date    WBC 5.5 04/03/2019    HGB 14.3 04/03/2019     04/03/2019       BMP:   Lab Results   Component Value Date     11/17/2018    K 3.5 11/17/2018     11/17/2018    CO2 22 11/17/2018    BUN 7 11/17/2018    CREATININE 0.96 04/03/2019    GLUCOSE 95 11/17/2018       FASTING LIPID PANEL:   Lab Results   Component Value Date    CHOL 166 03/26/2019    HDL 59 03/26/2019    TRIG 99 03/26/2019       LFTS:   Lab Results   Component Value Date    AST 65 11/17/2018    ALT 67 11/17/2018    BILITOT 0.6 11/17/2018    ALKPHOS 104 11/17/2018     ______________________________________________________________________  Assessment/Plan:  No clinically significant drug/drug interactions identified. No recommendation with current therapy identified. BMD should be monitored, last DEXA completed prior to initiating therapy in March 2019.  Serum testosterone should be completed ~4 weeks after initiation of therapy, which would be due ~5/1/19. LH, FSH, and PSA levels should also be monitored periodically.      _____________________________________________________________________  Next Follow-Up: 4/22/19 with [facility]    Benito Vega, PharmD  PGY-2 Ambulatory Care Pharmacy Resident  [address] Medication Management  Phone: [phone]  4/22/2019 9:35 AM

## 2019-04-22 NOTE — PROGRESS NOTES
Julieta Gupta M.D. Eugenie Cueva. Ingrid Mcgregor, Ph.D., M.D., Tyronne Riedel, M.D. Gibran Brown, Ph.D., M.D. Valeria Bonilla M.D. Date of Service: 2019    Location:  [address] [place] [address] [address] [place] [address]   [phone]     RADIATION ONCOLOGY WEEKLY PROGRESS NOTE    Patient ID:   Will Bullock  : 1950   MRN: [id number]    Diagnosis:  Cancer Staging  Prostate cancer [facility])  Staging form: Prostate, AJCC 8th Edition  - Clinical: No stage assigned - Unsigned  - Pathologic stage from 3/29/2019: Stage IIIB (pT3a, pN0, cM0, PSA: 3.9, Grade Group: 3) - Signed by Valeria Bonilla MD on 3/29/2019      Radiation Treatment Information:   Actual Dose: 1440 cGy  Total Planned Dose: 4500 cGy, 2160 centigrays boost  Treatment Site: Pelvis/prostate/seminal vesicular bed, prostate bed    IMAGING:   CBCT    CHEMOTHERAPY UPDATE:   Treatment Summary   Plan Name LEUPROLIDE (LUPRON DEPOT) IM   Treatment goal [No plan goal]   Provider Mee Lyons MD   Status Active   Start Date [No treatment day found]   End Date 4/3/2019   Treatment plan weight/height/BSA [Plan weight, height, and BSA not specified]   Most recent weight/height/BSA Weight: Weight: 222 lb (100.7 kg)    Height: Height: 5' 7\" (1.702 m)    BSA: BSA (Calculated - sq m): 2.18 sq meters      Treatment on Clinical Trial? [This plan is not part of a research study]   Reason for stopping treatment [Plan is still active]   Treatment Plan Medications leuprolide (LUPRON) injection 7.5 mg, 7.5 mg, Intramuscular, ONCE, 1 of 1 cycle  Administration: 7.5 mg (4/3/2019)           SUBJECTIVE: Will Bullock  continues to tolerating radiation therapy well. He has had occasional diarrhea which is well controlled with over-the-counter Imodium. He denies any nausea, vomiting, constipation. He has no dysuria or hematuria.   He continues to experience stress incontinence which has not changed since starting radiation therapy. OBJECTIVE:     Labs:  WBC   Date Value Ref Range Status   04/03/2019 5.5 3.5 - 11.3 k/uL Final     Segs Absolute   Date Value Ref Range Status   04/03/2019 3.36 1.50 - 8.10 k/uL Final     Hemoglobin   Date Value Ref Range Status   04/03/2019 14.3 13.0 - 17.0 g/dL Final     Platelets   Date Value Ref Range Status   04/03/2019 219 138 - 453 k/uL Final   [unfilled]  PSA   Date Value Ref Range Status   03/26/2019 <0.01 <4.1 ug/L Final     Comment:     The Roche \"ECLIA\" assay is used. Results obtained with different assay methods cannot be   used interchangeably. 01/15/2019 <0.01 <4.1 ug/L Final     Comment:     The Roche \"ECLIA\" assay is used. Results obtained with different assay methods   cannot be used interchangeably. 11/05/2018 3.85 <4.1 ug/L Final     Comment:     The Roche \"ECLIA\" assay is used. Results obtained with different assay methods   cannot be used interchangeably.        Medications:    Current Outpatient Medications:     leuprolide (LUPRON DEPOT, 1-MONTH,) 7.5 MG injection, Inject 7.5 mg into the muscle every 28 days, Disp: 1 kit, Rfl: 11    lisinopril (PRINIVIL;ZESTRIL) 10 MG tablet, Takes 2 pills in the am and 1 and 1/2 pills in the PM, Disp: 90 tablet, Rfl: 3    allopurinol (ZYLOPRIM) 300 MG tablet, Take 3/4 tablet daily, Disp: 90 tablet, Rfl: 3    amLODIPine (NORVASC) 5 MG tablet, Take 1 tablet by mouth daily, Disp: 90 tablet, Rfl: 3    cloNIDine (CATAPRES) 0.1 MG tablet, Take 1 tablet by mouth daily as needed for High Blood Pressure (150/90 or above), Disp: 90 tablet, Rfl: 3    flecainide (TAMBOCOR) 100 MG tablet, Take 1 tablet by mouth 2 times daily, Disp: 180 tablet, Rfl: 3    furosemide (LASIX) 20 MG tablet, Take 1 tablet by mouth daily, Disp: 90 tablet, Rfl: 3    meloxicam (MOBIC) 7.5 MG tablet, Take 1 tablet by mouth 2 times daily, Disp: 180 tablet, Rfl: 3    rosuvastatin (CRESTOR) 10 MG tablet, Take 1 tablet by mouth daily, Disp: 90 tablet, Rfl: 3   zolpidem (AMBIEN) 5 MG tablet, Indications: pt take 1 1/2 a day Patient states that he takes 1.5 pills every Night, Disp: 135 tablet, Rfl: 1    Multiple Vitamin (MULTI-VITAMIN) TABS, Take 1 tablet by mouth, Disp: , Rfl:     nitroGLYCERIN (NITROSTAT) 0.4 MG SL tablet, Place 0.4 mg under the tongue every 5 minutes as needed , Disp: , Rfl:     Pain Plan:  None needed       Patient Currently in Pain: Denies         Immunizations/Smoking Status:  Immunization History   Administered Date(s) Administered    Hepatitis B, unspecified formulation 2012, 2012, 2012, 2013    Influenza Vaccine, unspecified formulation 2012, 10/11/2013, 10/10/2014    Influenza, High Dose (Fluzone 65 yrs and older) 10/06/2015, 10/11/2016, 2017, 2018    MMR 10/22/2007, 2012    Pneumococcal 13-valent Conjugate (Kyybtde10) 10/06/2015    Pneumococcal Polysaccharide (Zyreqtbou96) 2012, 2017    Tdap (Boostrix, Adacel) 05/10/2017    Varicella (Varivax) 2016    Zoster Subunit (Shingrix) 2018, 2018       Social History     Tobacco Use   Smoking Status Never Smoker   Smokeless Tobacco Never Used     Counseling given: Not Answered      PHYSICAL FINDINGS:    CHAPERONE: Not Required    ECO Asymptomatic      Vital Signs:  BP (!) 161/94   Pulse 63   Temp 97.8 °F (36.6 °C) (Oral)   Resp 16   SpO2 99%   Wt Readings from Last 5 Encounters:   19 222 lb (100.7 kg)   04/15/19 225 lb 3.2 oz (102.2 kg)   19 224 lb (101.6 kg)   19 223 lb 9.6 oz (101.4 kg)   19 225 lb (102.1 kg)     The patient is in no acute distress, oriented in time, person and place. Mucous membrane is pink, moist and anicteric. ASSESSMENT PLAN: The patient continues to tolerate radiation therapy well. We'll continue radiation therapy as planned.     Electronically signed by Lucía Falcon MD on 2019 at 4:58 PM  [address] [facility] Prescribed:  New Prescriptions    No medications on file       Other Orders Placed:  No orders of the defined types were placed in this encounter.

## 2019-04-22 NOTE — PROGRESS NOTES
[facility]  [facility] [address] [facility]  [address]  Dept: [phone]  Dept Fax: [phone]    Initial Specialty Medication Office Visit  Date of patient's visit: 4/22/2019  Patient's Name:  Becca Gtz YOB: 1950            Patient Care Team:  Aruna Nguyen PA-C as PCP - General (Physician Assistant)  Vitaly Collins MD as PCP - S Attributed Provider  Jesus Healy MD as Consulting Physician (Infectious Diseases)  Sanya Alvares, BERNARDO as Nurse Navigator (Oncology)  ================================================================    REASON FOR VISIT/CHIEF COMPLAINT:  Medication Management    HISTORY OF PRESENTING ILLNESS:  History was obtained from: patient. Becca Gtz is 76 y.o. is here for initial visit for specialty medication. Patient has history of prostate cancer and currently on Lupron. He follows up with oncology. Last visit was a few months ago. Patient has no symptoms today. Becca Gtz has no new complain today.        Patient Active Problem List   Diagnosis    Benign essential HTN    Primary insomnia    Prostate cancer ([facility] [address]    Gout    Hyperlipidemia    Hypertension    Osteoarthrosis       Health Maintenance Due   Topic Date Due    Colon cancer screen colonoscopy  08/30/2000       Allergies   Allergen Reactions    Phenothiazines Other (See Comments)     Neuromotor reaction called a phenothiazine reaction    Sulfa Antibiotics Other (See Comments)    Prochlorperazine Edisylate Other (See Comments)     Lose control         Current Outpatient Medications   Medication Sig Dispense Refill    leuprolide (LUPRON DEPOT, 1-MONTH,) 7.5 MG injection Inject 7.5 mg into the muscle every 28 days 1 kit 11    lisinopril (PRINIVIL;ZESTRIL) 10 MG tablet Takes 2 pills in the am and 1 and 1/2 pills in the PM 90 tablet 3    allopurinol (ZYLOPRIM) 300 MG tablet Take 3/4 tablet daily 90 tablet 3    amLODIPine (NORVASC) 5 MG tablet Take 1 tablet by mouth daily 90 tablet 3    cloNIDine (CATAPRES) 0.1 MG tablet Take 1 tablet by mouth daily as needed for High Blood Pressure (150/90 or above) 90 tablet 3    flecainide (TAMBOCOR) 100 MG tablet Take 1 tablet by mouth 2 times daily 180 tablet 3    furosemide (LASIX) 20 MG tablet Take 1 tablet by mouth daily 90 tablet 3    meloxicam (MOBIC) 7.5 MG tablet Take 1 tablet by mouth 2 times daily 180 tablet 3    rosuvastatin (CRESTOR) 10 MG tablet Take 1 tablet by mouth daily 90 tablet 3    zolpidem (AMBIEN) 5 MG tablet Indications: pt take 1 1/2 a day Patient states that he takes 1.5 pills every Night 135 tablet 1    Multiple Vitamin (MULTI-VITAMIN) TABS Take 1 tablet by mouth      nitroGLYCERIN (NITROSTAT) 0.4 MG SL tablet Place 0.4 mg under the tongue every 5 minutes as needed        No current facility-administered medications for this visit. Social History     Tobacco Use    Smoking status: Never Smoker    Smokeless tobacco: Never Used   Substance Use Topics    Alcohol use:  Yes     Alcohol/week: 1.2 oz     Types: 2 Cans of beer per week    Drug use: Never       Family History   Problem Relation Age of Onset    Prostate Cancer Father 71    Kidney Cancer Father         58s     Cancer Maternal Aunt         d. 48 unknown cancer     Prostate Cancer Paternal Grandfather 76        REVIEW OF SYSTEMS:  Review of Systems    PHYSICAL EXAM:  Vitals:    04/22/19 1455 04/22/19 1457   BP: (!) 159/95 (!) 159/100   Site: Right Upper Arm Left Upper Arm   Position: Sitting Sitting   Cuff Size: Medium Adult Large Adult   Pulse: 70 72   Weight: 222 lb (100.7 kg)    Height: 5' 7\" (1.702 m)      BP Readings from Last 3 Encounters:   04/22/19 (!) 161/94   04/22/19 (!) 159/100   04/15/19 (!) 166/102        Physical Exam      DIAGNOSTIC FINDINGS:  CBC:  Lab Results   Component Value Date    WBC 5.5 04/03/2019    HGB 14.3 04/03/2019     04/03/2019       BMP:    Lab Results

## 2019-04-23 ENCOUNTER — HOSPITAL ENCOUNTER (OUTPATIENT)
Dept: RADIATION ONCOLOGY | Age: 69
Discharge: HOME | End: 2019-04-23
Attending: RADIOLOGY
Payer: COMMERCIAL

## 2019-04-23 PROCEDURE — 77385 HC NTSTY MODUL RAD TX DLVR SMPL: CPT | Performed by: RADIOLOGY

## 2019-04-24 ENCOUNTER — APPOINTMENT (OUTPATIENT)
Dept: RADIATION ONCOLOGY | Age: 69
End: 2019-04-24
Attending: RADIOLOGY
Payer: COMMERCIAL

## 2019-04-24 ENCOUNTER — HOSPITAL ENCOUNTER (OUTPATIENT)
Dept: RADIATION ONCOLOGY | Age: 69
Discharge: HOME | End: 2019-04-24
Attending: RADIOLOGY
Payer: COMMERCIAL

## 2019-04-24 PROCEDURE — 77336 RADIATION PHYSICS CONSULT: CPT | Performed by: RADIOLOGY

## 2019-04-24 PROCEDURE — 77385 HC NTSTY MODUL RAD TX DLVR SMPL: CPT | Performed by: RADIOLOGY

## 2019-04-25 ENCOUNTER — HOSPITAL ENCOUNTER (OUTPATIENT)
Dept: RADIATION ONCOLOGY | Age: 69
Discharge: HOME | End: 2019-04-25
Attending: RADIOLOGY
Payer: COMMERCIAL

## 2019-04-25 PROCEDURE — 77385 HC NTSTY MODUL RAD TX DLVR SMPL: CPT | Performed by: RADIOLOGY

## 2019-04-26 ENCOUNTER — HOSPITAL ENCOUNTER (OUTPATIENT)
Dept: RADIATION ONCOLOGY | Age: 69
Discharge: HOME | End: 2019-04-26
Attending: RADIOLOGY
Payer: COMMERCIAL

## 2019-04-26 PROCEDURE — 77385 HC NTSTY MODUL RAD TX DLVR SMPL: CPT | Performed by: RADIOLOGY

## 2019-04-29 ENCOUNTER — HOSPITAL ENCOUNTER (OUTPATIENT)
Dept: RADIATION ONCOLOGY | Age: 69
Discharge: HOME | End: 2019-04-29
Attending: RADIOLOGY
Payer: COMMERCIAL

## 2019-04-29 VITALS
RESPIRATION RATE: 16 BRPM | DIASTOLIC BLOOD PRESSURE: 84 MMHG | HEART RATE: 73 BPM | TEMPERATURE: 97.5 F | BODY MASS INDEX: 35.24 KG/M2 | WEIGHT: 225 LBS | SYSTOLIC BLOOD PRESSURE: 138 MMHG

## 2019-04-29 DIAGNOSIS — C61 PROSTATE CANCER (HCC): Primary | ICD-10-CM

## 2019-04-29 PROCEDURE — 77385 HC NTSTY MODUL RAD TX DLVR SMPL: CPT | Performed by: RADIOLOGY

## 2019-04-29 NOTE — PROGRESS NOTES
Everette Abebe M.D. Goldie Castro. Lauren Gomes, Ph.D., M.D., Aimee Brower M.D. Raghavendra Valverde, Ph.D., M.D. Tana Diaz M.D. Date of Service: 2019    Location:  [address] [place], [place] [address] [address], [facility], [facility]   [phone]     RADIATION ONCOLOGY WEEKLY PROGRESS NOTE    Patient ID:   Robinson Becerra  : 1950   MRN: [id number]    Diagnosis:  Cancer Staging  Prostate cancer [facility])  Staging form: Prostate, AJCC 8th Edition  - Clinical: No stage assigned - Unsigned  - Pathologic stage from 3/29/2019: Stage IIIB (pT3a, pN0, cM0, PSA: 3.9, Grade Group: 3) - Signed by Tana Diaz MD on 3/29/2019      Radiation Treatment Information:   Actual Dose: 2340 cGy  Total Planned Dose: 4500 cGy, 2160 centigrays boost  Treatment Site: Pelvis/prostate/seminal vesicular bed, prostate bed boost    IMAGING:   CBCT    CHEMOTHERAPY UPDATE:   Treatment Summary   Plan Name LEUPROLIDE (LUPRON DEPOT) IM   Treatment goal [No plan goal]   Provider Camryn Conner MD   Status Active   Start Date [No treatment day found]   End Date 4/3/2019   Treatment plan weight/height/BSA [Plan weight, height, and BSA not specified]   Most recent weight/height/BSA Weight: Weight: 225 lb (102.1 kg)    Height: Height: 5' 7\" (1.702 m)    BSA: BSA (Calculated - sq m): 2.18 sq meters      Treatment on Clinical Trial? [This plan is not part of a research study]   Reason for stopping treatment [Plan is still active]   Treatment Plan Medications leuprolide (LUPRON) injection 7.5 mg, 7.5 mg, Intramuscular, ONCE, 1 of 1 cycle  Administration: 7.5 mg (4/3/2019)           SUBJECTIVE: Robinson Becerra  continues to tolerate radiation therapy relatively well. He has had occasional diarrhea for which he uses Imodium. He denies any dysuria or hematuria. He has no urgency or hesitancy. Have occasional stress incontinence.     OBJECTIVE:     Labs:  WBC   Date Value Ref Range Status 04/03/2019 5.5 3.5 - 11.3 k/uL Final     Segs Absolute   Date Value Ref Range Status   04/03/2019 3.36 1.50 - 8.10 k/uL Final     Hemoglobin   Date Value Ref Range Status   04/03/2019 14.3 13.0 - 17.0 g/dL Final     Platelets   Date Value Ref Range Status   04/03/2019 219 138 - 453 k/uL Final   @ANA MARIA@  PSA   Date Value Ref Range Status   03/26/2019 <0.01 <4.1 ug/L Final     Comment:     The Roche \"ECLIA\" assay is used. Results obtained with different assay methods cannot be   used interchangeably. 01/15/2019 <0.01 <4.1 ug/L Final     Comment:     The Roche \"ECLIA\" assay is used. Results obtained with different assay methods   cannot be used interchangeably. 11/05/2018 3.85 <4.1 ug/L Final     Comment:     The Roche \"ECLIA\" assay is used. Results obtained with different assay methods   cannot be used interchangeably.        Medications:    Current Outpatient Medications:     leuprolide (LUPRON DEPOT, 1-MONTH,) 7.5 MG injection, Inject 7.5 mg into the muscle every 28 days, Disp: 1 kit, Rfl: 11    lisinopril (PRINIVIL;ZESTRIL) 10 MG tablet, Takes 2 pills in the am and 1 and 1/2 pills in the PM, Disp: 90 tablet, Rfl: 3    allopurinol (ZYLOPRIM) 300 MG tablet, Take 3/4 tablet daily, Disp: 90 tablet, Rfl: 3    amLODIPine (NORVASC) 5 MG tablet, Take 1 tablet by mouth daily, Disp: 90 tablet, Rfl: 3    cloNIDine (CATAPRES) 0.1 MG tablet, Take 1 tablet by mouth daily as needed for High Blood Pressure (150/90 or above), Disp: 90 tablet, Rfl: 3    flecainide (TAMBOCOR) 100 MG tablet, Take 1 tablet by mouth 2 times daily, Disp: 180 tablet, Rfl: 3    furosemide (LASIX) 20 MG tablet, Take 1 tablet by mouth daily, Disp: 90 tablet, Rfl: 3    meloxicam (MOBIC) 7.5 MG tablet, Take 1 tablet by mouth 2 times daily, Disp: 180 tablet, Rfl: 3    rosuvastatin (CRESTOR) 10 MG tablet, Take 1 tablet by mouth daily, Disp: 90 tablet, Rfl: 3    zolpidem (AMBIEN) 5 MG tablet, Indications: pt take 1 1/2 a day Patient states that he takes 1.5 pills every Night, Disp: 135 tablet, Rfl: 1    Multiple Vitamin (MULTI-VITAMIN) TABS, Take 1 tablet by mouth, Disp: , Rfl:     nitroGLYCERIN (NITROSTAT) 0.4 MG SL tablet, Place 0.4 mg under the tongue every 5 minutes as needed , Disp: , Rfl:     Pain Plan:  None needed       Patient Currently in Pain: No         Immunizations/Smoking Status:  Immunization History   Administered Date(s) Administered    Hepatitis B, unspecified formulation 2012, 2012, 2012, 2013    Influenza Vaccine, unspecified formulation 2012, 10/11/2013, 10/10/2014    Influenza, High Dose (Fluzone 65 yrs and older) 10/06/2015, 10/11/2016, 2017, 2018    MMR 10/22/2007, 2012    Pneumococcal 13-valent Conjugate (Hzhowyk35) 10/06/2015    Pneumococcal Polysaccharide (Mefnmrnbh35) 2012, 2017    Tdap (Boostrix, Adacel) 05/10/2017    Varicella (Varivax) 2016    Zoster Subunit (Shingrix) 2018, 2018       Social History     Tobacco Use   Smoking Status Never Smoker   Smokeless Tobacco Never Used     Counseling given: Not Answered      PHYSICAL FINDINGS:    CHAPERONE: Not Required    ECO Asymptomatic      Vital Signs:  /84   Pulse 73   Temp 97.5 °F (36.4 °C) (Oral)   Resp 16   Wt 225 lb (102.1 kg)   BMI 35.24 kg/m²   Wt Readings from Last 5 Encounters:   19 225 lb (102.1 kg)   19 222 lb (100.7 kg)   04/15/19 225 lb 3.2 oz (102.2 kg)   19 224 lb (101.6 kg)   19 223 lb 9.6 oz (101.4 kg)     The patient is in no acute distress, oriented in time, person and place. Mucous membrane is pink, moist and nonicteric. Abdomen is soft and nontender. ASSESSMENT PLAN: The patient continues to tolerate radiation therapy well. We'll continue radiation therapy as planned.     Electronically signed by Linda Story MD on 2019 at 4:54 PM  [address]    Drugs Prescribed:  New Prescriptions    No medications on file       Other Orders Placed:  No orders of the defined types were placed in this encounter.

## 2019-04-30 ENCOUNTER — HOSPITAL ENCOUNTER (OUTPATIENT)
Dept: RADIATION ONCOLOGY | Age: 69
Discharge: HOME | End: 2019-04-30
Attending: RADIOLOGY
Payer: COMMERCIAL

## 2019-04-30 PROCEDURE — 77385 HC NTSTY MODUL RAD TX DLVR SMPL: CPT | Performed by: RADIOLOGY

## 2019-05-01 ENCOUNTER — HOSPITAL ENCOUNTER (OUTPATIENT)
Dept: RADIATION ONCOLOGY | Age: 69
Discharge: HOME | End: 2019-05-01
Attending: RADIOLOGY
Payer: COMMERCIAL

## 2019-05-01 ENCOUNTER — HOSPITAL ENCOUNTER (OUTPATIENT)
Dept: INFUSION THERAPY | Age: 69
Discharge: HOME | End: 2019-05-01
Payer: COMMERCIAL

## 2019-05-01 DIAGNOSIS — C61 PROSTATE CANCER (HCC): Primary | ICD-10-CM

## 2019-05-01 PROCEDURE — 77385 HC NTSTY MODUL RAD TX DLVR SMPL: CPT | Performed by: RADIOLOGY

## 2019-05-01 PROCEDURE — 77336 RADIATION PHYSICS CONSULT: CPT | Performed by: RADIOLOGY

## 2019-05-01 PROCEDURE — 96401 CHEMO ANTI-NEOPL SQ/IM: CPT

## 2019-05-01 NOTE — PROGRESS NOTES
Pt here for Lupron injection. Pt was treated without incident and discharged in stable condition. He will return on 5/29/19 for next Lupron injection.

## 2019-05-02 ENCOUNTER — HOSPITAL ENCOUNTER (OUTPATIENT)
Dept: RADIATION ONCOLOGY | Age: 69
Discharge: HOME | End: 2019-05-02
Attending: RADIOLOGY
Payer: COMMERCIAL

## 2019-05-02 PROCEDURE — 77385 HC NTSTY MODUL RAD TX DLVR SMPL: CPT | Performed by: RADIOLOGY

## 2019-05-03 ENCOUNTER — APPOINTMENT (OUTPATIENT)
Dept: RADIATION ONCOLOGY | Age: 69
End: 2019-05-03
Attending: RADIOLOGY
Payer: COMMERCIAL

## 2019-05-06 ENCOUNTER — HOSPITAL ENCOUNTER (OUTPATIENT)
Dept: RADIATION ONCOLOGY | Age: 69
Discharge: HOME | End: 2019-05-06
Attending: RADIOLOGY
Payer: COMMERCIAL

## 2019-05-06 VITALS
WEIGHT: 225 LBS | DIASTOLIC BLOOD PRESSURE: 91 MMHG | SYSTOLIC BLOOD PRESSURE: 155 MMHG | BODY MASS INDEX: 35.24 KG/M2 | OXYGEN SATURATION: 99 % | HEART RATE: 53 BPM | RESPIRATION RATE: 18 BRPM | TEMPERATURE: 98.4 F

## 2019-05-06 DIAGNOSIS — C61 PROSTATE CANCER (HCC): Primary | ICD-10-CM

## 2019-05-06 PROCEDURE — 77385 HC NTSTY MODUL RAD TX DLVR SMPL: CPT | Performed by: RADIOLOGY

## 2019-05-06 NOTE — PROGRESS NOTES
Blessing Vazquez M.D. [place]. Lisa Acharya, Ph.D., M.D., Fernanda Morales M.D. Estrella Toribio, Ph.D., M.D. Nhan Browning M.D. Date of Service: 2019    Location:  [address] [place] [address]   [facility] [address] [place]   [phone]     RADIATION ONCOLOGY WEEKLY PROGRESS NOTE    Patient ID:   Janell Tidwell  : 1950   MRN: [id number]    Diagnosis:  Cancer Staging  Prostate cancer [facility])  Staging form: Prostate, AJCC 8th Edition  - Clinical: No stage assigned - Unsigned  - Pathologic stage from 3/29/2019: Stage IIIB (pT3a, pN0, cM0, PSA: 3.9, Grade Group: 3) - Signed by Nhan Browning MD on 3/29/2019      Radiation Treatment Information:   Actual Dose: 3060 cGy  Total Planned Dose: 4500 cGy, 2160 centigrays boost  Treatment Site: Pelvis/prostate/seminal vesicular Nolene Gonzalez, prostate bed boost    IMAGING:   CBCT    CHEMOTHERAPY UPDATE:   Treatment Summary   Plan Name LEUPROLIDE (LUPRON DEPOT) IM   Treatment goal [No plan goal]   Provider Jeanna Watts MD   Status Active   Start Date [No treatment day found]   End Date 2019   Treatment plan weight/height/BSA [Plan weight, height, and BSA not specified]   Most recent weight/height/BSA Weight: Weight: 225 lb (102.1 kg)    Height: Height: 5' 7\" (1.702 m)    BSA: BSA (Calculated - sq m): 2.18 sq meters      Treatment on Clinical Trial? [This plan is not part of a research study]   Reason for stopping treatment [Plan is still active]   Treatment Plan Medications leuprolide (LUPRON) injection 7.5 mg, 7.5 mg, Intramuscular, ONCE, 1 of 1 cycle  Administration: 7.5 mg (4/3/2019), 7.5 mg (2019)           SUBJECTIVE: Janell Tidwell  continues to experience a frequency and urgency. He has tried Azo for the last 3 days but has not noticed any discernible changes. He denies any dysuria. He has no nausea, vomiting, constipation or diarrhea.     OBJECTIVE:     Labs:  WBC   Date Value Ref Range Status 04/03/2019 5.5 3.5 - 11.3 k/uL Final     Segs Absolute   Date Value Ref Range Status   04/03/2019 3.36 1.50 - 8.10 k/uL Final     Hemoglobin   Date Value Ref Range Status   04/03/2019 14.3 13.0 - 17.0 g/dL Final     Platelets   Date Value Ref Range Status   04/03/2019 219 138 - 453 k/uL Final   @ANA MARIA@  PSA   Date Value Ref Range Status   03/26/2019 <0.01 <4.1 ug/L Final     Comment:     The Roche \"ECLIA\" assay is used. Results obtained with different assay methods cannot be   used interchangeably. 01/15/2019 <0.01 <4.1 ug/L Final     Comment:     The Roche \"ECLIA\" assay is used. Results obtained with different assay methods   cannot be used interchangeably. 11/05/2018 3.85 <4.1 ug/L Final     Comment:     The Roche \"ECLIA\" assay is used. Results obtained with different assay methods   cannot be used interchangeably.      ications:    Current Outpatient Medications:     leuprolide (LUPRON DEPOT, 1-MONTH,) 7.5 MG injection, Inject 7.5 mg into the muscle every 28 days, Disp: 1 kit, Rfl: 11    lisinopril (PRINIVIL;ZESTRIL) 10 MG tablet, Takes 2 pills in the am and 1 and 1/2 pills in the PM, Disp: 90 tablet, Rfl: 3    allopurinol (ZYLOPRIM) 300 MG tablet, Take 3/4 tablet daily, Disp: 90 tablet, Rfl: 3    amLODIPine (NORVASC) 5 MG tablet, Take 1 tablet by mouth daily, Disp: 90 tablet, Rfl: 3    cloNIDine (CATAPRES) 0.1 MG tablet, Take 1 tablet by mouth daily as needed for High Blood Pressure (150/90 or above), Disp: 90 tablet, Rfl: 3    flecainide (TAMBOCOR) 100 MG tablet, Take 1 tablet by mouth 2 times daily, Disp: 180 tablet, Rfl: 3    furosemide (LASIX) 20 MG tablet, Take 1 tablet by mouth daily, Disp: 90 tablet, Rfl: 3    meloxicam (MOBIC) 7.5 MG tablet, Take 1 tablet by mouth 2 times daily, Disp: 180 tablet, Rfl: 3    rosuvastatin (CRESTOR) 10 MG tablet, Take 1 tablet by mouth daily, Disp: 90 tablet, Rfl: 3    zolpidem (AMBIEN) 5 MG tablet, Indications: pt take 1 1/2 a day Patient states that he takes 1.5 pills every Night, Disp: 135 tablet, Rfl: 1    Multiple Vitamin (MULTI-VITAMIN) TABS, Take 1 tablet by mouth, Disp: , Rfl:     nitroGLYCERIN (NITROSTAT) 0.4 MG SL tablet, Place 0.4 mg under the tongue every 5 minutes as needed , Disp: , Rfl:     Pain Plan:  None needed       Patient Currently in Pain: Denies         Immunizations/Smoking Status:  Immunization History   Administered Date(s) Administered    Hepatitis B, unspecified formulation 2012, 2012, 2012, 2013    Influenza Vaccine, unspecified formulation 2012, 10/11/2013, 10/10/2014    Influenza, High Dose (Fluzone 65 yrs and older) 10/06/2015, 10/11/2016, 2017, 2018    MMR 10/22/2007, 2012    Pneumococcal 13-valent Conjugate (Nuweirg56) 10/06/2015    Pneumococcal Polysaccharide (Vnddkizis18) 2012, 2017    Tdap (Boostrix, Adacel) 05/10/2017    Varicella (Varivax) 2016    Zoster Subunit (Shingrix) 2018, 2018       Social History     Tobacco Use   Smoking Status Never Smoker   Smokeless Tobacco Never Used     Counseling given: Not Answered      PHYSICAL FINDINGS:    CHAPERONE: Not Required    ECO Asymptomatic      Vital Signs:  BP (!) 155/91   Pulse 53   Temp 98.4 °F (36.9 °C) (Oral)   Resp 18   Wt 225 lb (102.1 kg)   SpO2 99%   BMI 35.24 kg/m²   Wt Readings from Last 5 Encounters:   19 225 lb (102.1 kg)   19 225 lb (102.1 kg)   19 222 lb (100.7 kg)   04/15/19 225 lb 3.2 oz (102.2 kg)   19 224 lb (101.6 kg)     The patient is in no acute distress, oriented in time, person and place. Mucous membrane is pink, moist and anicteric. ASSESSMENT PLAN: The patient is tolerating radiation therapy relatively well. He continues to have frequency and urgency spite the Azo. I did offer the patient Pyridium and he indicated he would like to try Azo for an additional 1 week. Continue radiation therapy as planned.     Electronically signed by Eliecer Valdez MD on 5/6/2019 at 5:05 PM  [address]    Drugs Prescribed:  New Prescriptions    No medications on file       Other Orders Placed:  No orders of the defined types were placed in this encounter.

## 2019-05-07 ENCOUNTER — HOSPITAL ENCOUNTER (OUTPATIENT)
Dept: RADIATION ONCOLOGY | Age: 69
Discharge: HOME | End: 2019-05-07
Attending: RADIOLOGY
Payer: COMMERCIAL

## 2019-05-07 PROCEDURE — 77385 HC NTSTY MODUL RAD TX DLVR SMPL: CPT | Performed by: RADIOLOGY

## 2019-05-08 ENCOUNTER — HOSPITAL ENCOUNTER (OUTPATIENT)
Dept: RADIATION ONCOLOGY | Age: 69
Discharge: HOME | End: 2019-05-08
Attending: RADIOLOGY
Payer: COMMERCIAL

## 2019-05-08 PROCEDURE — 77385 HC NTSTY MODUL RAD TX DLVR SMPL: CPT | Performed by: RADIOLOGY

## 2019-05-09 ENCOUNTER — HOSPITAL ENCOUNTER (OUTPATIENT)
Dept: RADIATION ONCOLOGY | Age: 69
Discharge: HOME | End: 2019-05-09
Attending: RADIOLOGY
Payer: COMMERCIAL

## 2019-05-09 PROCEDURE — 77336 RADIATION PHYSICS CONSULT: CPT | Performed by: RADIOLOGY

## 2019-05-09 PROCEDURE — 77385 HC NTSTY MODUL RAD TX DLVR SMPL: CPT | Performed by: RADIOLOGY

## 2019-05-10 ENCOUNTER — HOSPITAL ENCOUNTER (OUTPATIENT)
Dept: RADIATION ONCOLOGY | Age: 69
Discharge: HOME | End: 2019-05-10
Attending: RADIOLOGY
Payer: COMMERCIAL

## 2019-05-10 PROCEDURE — 77385 HC NTSTY MODUL RAD TX DLVR SMPL: CPT | Performed by: RADIOLOGY

## 2019-05-13 ENCOUNTER — HOSPITAL ENCOUNTER (OUTPATIENT)
Dept: RADIATION ONCOLOGY | Age: 69
Discharge: HOME | End: 2019-05-13
Attending: RADIOLOGY
Payer: COMMERCIAL

## 2019-05-13 VITALS
HEART RATE: 54 BPM | WEIGHT: 227 LBS | SYSTOLIC BLOOD PRESSURE: 155 MMHG | DIASTOLIC BLOOD PRESSURE: 82 MMHG | RESPIRATION RATE: 16 BRPM | OXYGEN SATURATION: 99 % | BODY MASS INDEX: 35.55 KG/M2 | TEMPERATURE: 98.7 F

## 2019-05-13 DIAGNOSIS — C61 PROSTATE CANCER (HCC): Primary | ICD-10-CM

## 2019-05-13 PROCEDURE — 77385 HC NTSTY MODUL RAD TX DLVR SMPL: CPT | Performed by: RADIOLOGY

## 2019-05-13 RX ORDER — PHENAZOPYRIDINE HYDROCHLORIDE 100 MG/1
200 TABLET, FILM COATED ORAL 3 TIMES DAILY PRN
Qty: 60 TABLET | Refills: 1 | Status: SHIPPED | OUTPATIENT
Start: 2019-05-13 | End: 2019-05-16

## 2019-05-13 NOTE — PROGRESS NOTES
Terese Gaines M.D. Tabitha Goldstein. June Pierre, Ph.D., M.D., Stephanie Arredondo M.D. Jim Albarran, Ph.D., M.D. Pia Soriano M.D. Date of Service: 2019    Location:  [facility] [address] [place]   [phone]     RADIATION ONCOLOGY WEEKLY PROGRESS NOTE    Patient ID:   Liliane Villeda  : 1950   MRN: [id number]    Diagnosis:  Cancer Staging  Prostate cancer [facility])  Staging form: Prostate, AJCC 8th Edition  - Clinical: No stage assigned - Unsigned  - Pathologic stage from 3/29/2019: Stage IIIB (pT3a, pN0, cM0, PSA: 3.9, Grade Group: 3) - Signed by Pia Soriano MD on 3/29/2019      Radiation Treatment Information:   Actual Dose: 3960 cGy  Total Planned Dose: 4500 cGy, 60 centigrays boost  Treatment Site: Pelvis/prostate/seminal vesicular bed, prostate bed boost    IMAGING:   CBCT    CHEMOTHERAPY UPDATE:   Treatment Summary   Plan Name LEUPROLIDE (LUPRON DEPOT) IM   Treatment goal [No plan goal]   Provider Elmer Lyons MD   Status Active   Start Date [No treatment day found]   End Date 2019   Treatment plan weight/height/BSA [Plan weight, height, and BSA not specified]   Most recent weight/height/BSA Weight: Weight: 227 lb (103 kg)    Height: Height: 5' 7\" (1.702 m)    BSA: BSA (Calculated - sq m): 2.18 sq meters      Treatment on Clinical Trial? [This plan is not part of a research study]   Reason for stopping treatment [Plan is still active]   Treatment Plan Medications leuprolide (LUPRON) injection 7.5 mg, 7.5 mg, Intramuscular, ONCE, 1 of 1 cycle  Administration: 7.5 mg (4/3/2019), 7.5 mg (2019)           SUBJECTIVE: Liliane Villeda  continues to tolerate radiation therapy relatively well. The patient did not worsening of the stress incontinence. 9 is any dysuria or hematuria. He has no nausea, vomiting, constipation or diarrhea.     OBJECTIVE:     Labs:  WBC   Date Value Ref Range Status   2019 5.5 3.5 - 11.3 k/uL Final     Segs Absolute   Date Value Ref Range Status   04/03/2019 3.36 1.50 - 8.10 k/uL Final     Hemoglobin   Date Value Ref Range Status   04/03/2019 14.3 13.0 - 17.0 g/dL Final     Platelets   Date Value Ref Range Status   04/03/2019 219 138 - 453 k/uL Final   [unfilled]  PSA   Date Value Ref Range Status   03/26/2019 <0.01 <4.1 ug/L Final     Comment:     The Roche \"ECLIA\" assay is used. Results obtained with different assay methods cannot be   used interchangeably. 01/15/2019 <0.01 <4.1 ug/L Final     Comment:     The Roche \"ECLIA\" assay is used. Results obtained with different assay methods   cannot be used interchangeably. 11/05/2018 3.85 <4.1 ug/L Final     Comment:     The Roche \"ECLIA\" assay is used. Results obtained with different assay methods   cannot be used interchangeably.        Medications:    Current Outpatient Medications:     phenazopyridine (PYRIDIUM) 100 MG tablet, Take 2 tablets by mouth 3 times daily as needed for Pain, Disp: 60 tablet, Rfl: 1    leuprolide (LUPRON DEPOT, 1-MONTH,) 7.5 MG injection, Inject 7.5 mg into the muscle every 28 days, Disp: 1 kit, Rfl: 11    lisinopril (PRINIVIL;ZESTRIL) 10 MG tablet, Takes 2 pills in the am and 1 and 1/2 pills in the PM, Disp: 90 tablet, Rfl: 3    allopurinol (ZYLOPRIM) 300 MG tablet, Take 3/4 tablet daily, Disp: 90 tablet, Rfl: 3    amLODIPine (NORVASC) 5 MG tablet, Take 1 tablet by mouth daily, Disp: 90 tablet, Rfl: 3    cloNIDine (CATAPRES) 0.1 MG tablet, Take 1 tablet by mouth daily as needed for High Blood Pressure (150/90 or above), Disp: 90 tablet, Rfl: 3    flecainide (TAMBOCOR) 100 MG tablet, Take 1 tablet by mouth 2 times daily, Disp: 180 tablet, Rfl: 3    furosemide (LASIX) 20 MG tablet, Take 1 tablet by mouth daily, Disp: 90 tablet, Rfl: 3    meloxicam (MOBIC) 7.5 MG tablet, Take 1 tablet by mouth 2 times daily, Disp: 180 tablet, Rfl: 3    rosuvastatin (CRESTOR) 10 MG tablet, Take 1 tablet by mouth daily, Disp: 90 tablet, Rfl: 3    zolpidem (AMBIEN) 5 MG tablet, Indications: pt take 1 1/2 a day Patient states that he takes 1.5 pills every Night, Disp: 135 tablet, Rfl: 1    Multiple Vitamin (MULTI-VITAMIN) TABS, Take 1 tablet by mouth, Disp: , Rfl:     nitroGLYCERIN (NITROSTAT) 0.4 MG SL tablet, Place 0.4 mg under the tongue every 5 minutes as needed , Disp: , Rfl:     Pain Plan:  Change in medication (see below)       Patient Currently in Pain: Denies         Immunizations/Smoking Status:  Immunization History   Administered Date(s) Administered    Hepatitis B, unspecified formulation 2012, 2012, 2012, 2013    Influenza Vaccine, unspecified formulation 2012, 10/11/2013, 10/10/2014    Influenza, High Dose (Fluzone 65 yrs and older) 10/06/2015, 10/11/2016, 2017, 2018    MMR 10/22/2007, 2012    Pneumococcal 13-valent Conjugate (Qzykimu15) 10/06/2015    Pneumococcal Polysaccharide (Yhmovlvcq78) 2012, 2017    Tdap (Boostrix, Adacel) 05/10/2017    Varicella (Varivax) 2016    Zoster Subunit (Shingrix) 2018, 2018       Social History     Tobacco Use   Smoking Status Never Smoker   Smokeless Tobacco Never Used     Counseling given: Not Answered      PHYSICAL FINDINGS:    CHAPERONE: Not Required    ECO Symptomatic but completely ambulatory      Vital Signs:  BP (!) 155/82   Pulse 54   Temp 98.7 °F (37.1 °C)   Resp 16   Wt 227 lb (103 kg)   SpO2 99%   BMI 35.55 kg/m²   Wt Readings from Last 5 Encounters:   19 227 lb (103 kg)   19 225 lb (102.1 kg)   19 225 lb (102.1 kg)   19 222 lb (100.7 kg)   04/15/19 225 lb 3.2 oz (102.2 kg)     The patient is in no acute distress, oriented in time, person and place. Mucous membrane is pink, moist and anicteric. Is soft and nontender. ASSESSMENT PLAN: The patient is now experiencing worsening stress incontinence. He continues the iLinc exercise.   He has been using Azo with no clinical effect. I have therefore switched to Pyridium. If his symptoms persists I would consider an antispasmodic. We will continue radiation therapy as planned. Electronically signed by Sheron House MD on 5/13/2019 at [facility].    Drugs Prescribed:  New Prescriptions    PHENAZOPYRIDINE (PYRIDIUM) 100 MG TABLET    Take 2 tablets by mouth 3 times daily as needed for Pain       Other Orders Placed:  No orders of the defined types were placed in this encounter.

## 2019-05-14 ENCOUNTER — HOSPITAL ENCOUNTER (OUTPATIENT)
Dept: RADIATION ONCOLOGY | Age: 69
Discharge: HOME | End: 2019-05-14
Attending: RADIOLOGY
Payer: COMMERCIAL

## 2019-05-14 PROCEDURE — 77385 HC NTSTY MODUL RAD TX DLVR SMPL: CPT | Performed by: RADIOLOGY

## 2019-05-14 PROCEDURE — 77300 RADIATION THERAPY DOSE PLAN: CPT | Performed by: RADIOLOGY

## 2019-05-14 PROCEDURE — 77338 DESIGN MLC DEVICE FOR IMRT: CPT | Performed by: RADIOLOGY

## 2019-05-15 ENCOUNTER — HOSPITAL ENCOUNTER (OUTPATIENT)
Dept: RADIATION ONCOLOGY | Age: 69
Discharge: HOME | End: 2019-05-15
Attending: RADIOLOGY
Payer: COMMERCIAL

## 2019-05-15 PROCEDURE — 77385 HC NTSTY MODUL RAD TX DLVR SMPL: CPT | Performed by: RADIOLOGY

## 2019-05-16 ENCOUNTER — HOSPITAL ENCOUNTER (OUTPATIENT)
Dept: RADIATION ONCOLOGY | Age: 69
Discharge: HOME | End: 2019-05-16
Attending: RADIOLOGY
Payer: COMMERCIAL

## 2019-05-16 PROCEDURE — 77336 RADIATION PHYSICS CONSULT: CPT | Performed by: RADIOLOGY

## 2019-05-16 PROCEDURE — 77385 HC NTSTY MODUL RAD TX DLVR SMPL: CPT | Performed by: RADIOLOGY

## 2019-05-17 ENCOUNTER — APPOINTMENT (OUTPATIENT)
Dept: RADIATION ONCOLOGY | Age: 69
End: 2019-05-17
Attending: RADIOLOGY
Payer: COMMERCIAL

## 2019-05-17 PROCEDURE — 77385 HC NTSTY MODUL RAD TX DLVR SMPL: CPT | Performed by: RADIOLOGY

## 2019-05-20 ENCOUNTER — APPOINTMENT (OUTPATIENT)
Dept: RADIATION ONCOLOGY | Age: 69
End: 2019-05-20
Attending: RADIOLOGY
Payer: COMMERCIAL

## 2019-05-21 ENCOUNTER — HOSPITAL ENCOUNTER (OUTPATIENT)
Dept: RADIATION ONCOLOGY | Age: 69
Discharge: HOME | End: 2019-05-21
Attending: RADIOLOGY
Payer: COMMERCIAL

## 2019-05-21 VITALS
BODY MASS INDEX: 35.71 KG/M2 | HEART RATE: 60 BPM | SYSTOLIC BLOOD PRESSURE: 110 MMHG | DIASTOLIC BLOOD PRESSURE: 68 MMHG | RESPIRATION RATE: 18 BRPM | WEIGHT: 228 LBS | TEMPERATURE: 98 F | OXYGEN SATURATION: 100 %

## 2019-05-21 DIAGNOSIS — C61 PROSTATE CANCER (HCC): Primary | ICD-10-CM

## 2019-05-21 PROCEDURE — 77385 HC NTSTY MODUL RAD TX DLVR SMPL: CPT | Performed by: RADIOLOGY

## 2019-05-21 RX ORDER — TOLTERODINE 2 MG/1
2 CAPSULE, EXTENDED RELEASE ORAL DAILY
Qty: 30 CAPSULE | Refills: 5 | Status: SHIPPED | OUTPATIENT
Start: 2019-05-21 | End: 2019-09-18

## 2019-05-21 NOTE — PROGRESS NOTES
Julia Levy M.D. Toyin Rocha. Katie Sales, Ph.D., M.D., Elisha Bishop M.D. Gabriela Yancey, Ph.D., M.D. Lesly Donohue M.D. Date of Service: 2019    Location:  [address] [place] [address],   [address] [facility]   [phone]     RADIATION ONCOLOGY WEEKLY PROGRESS NOTE    Patient ID:   Chaz Reynolds  : 1950   MRN: [id number]    Diagnosis:  Cancer Staging  Prostate cancer [facility])  Staging form: Prostate, AJCC 8th Edition  - Clinical: No stage assigned - Unsigned  - Pathologic stage from 3/29/2019: Stage IIIB (pT3a, pN0, cM0, PSA: 3.9, Grade Group: 3) - Signed by Lesly Donohue MD on 3/29/2019      Radiation Treatment Information:   Actual Dose: 4500 cGy, 180 cGy boost pre treatment  Total Planned Dose: 4500 cGy, 2160 centigrays boost  Treatment Site: Pelvis/prostate and seminal vesicular bed, prostate bed boost    IMAGING:   CBCT    CHEMOTHERAPY UPDATE:   Treatment Summary   Plan Name LEUPROLIDE (LUPRON DEPOT) IM   Treatment goal [No plan goal]   Provider Queta Jones MD   Status Active   Start Date [No treatment day found]   End Date 2019   Treatment plan weight/height/BSA [Plan weight, height, and BSA not specified]   Most recent weight/height/BSA Weight: Weight: 228 lb (103.4 kg)    Height: Height: 5' 7\" (1.702 m)    BSA: BSA (Calculated - sq m): 2.18 sq meters      Treatment on Clinical Trial? [This plan is not part of a research study]   Reason for stopping treatment [Plan is still active]   Treatment Plan Medications leuprolide (LUPRON) injection 7.5 mg, 7.5 mg, Intramuscular, ONCE, 1 of 1 cycle  Administration: 7.5 mg (4/3/2019), 7.5 mg (2019)           SUBJECTIVE: Chaz Reynolds  continues to experience occasional stress incontinence together with urgency. He denies any dysuria or hematuria. He continues to have occasional diarrhea which is controlled with over-the-counter Imodium.     OBJECTIVE:     Labs:  WBC Date Value Ref Range Status   04/03/2019 5.5 3.5 - 11.3 k/uL Final     Segs Absolute   Date Value Ref Range Status   04/03/2019 3.36 1.50 - 8.10 k/uL Final     Hemoglobin   Date Value Ref Range Status   04/03/2019 14.3 13.0 - 17.0 g/dL Final     Platelets   Date Value Ref Range Status   04/03/2019 219 138 - 453 k/uL Final   [unfilled]  PSA   Date Value Ref Range Status   03/26/2019 <0.01 <4.1 ug/L Final     Comment:     The Roche \"ECLIA\" assay is used. Results obtained with different assay methods cannot be   used interchangeably. 01/15/2019 <0.01 <4.1 ug/L Final     Comment:     The Roche \"ECLIA\" assay is used. Results obtained with different assay methods   cannot be used interchangeably. 11/05/2018 3.85 <4.1 ug/L Final     Comment:     The Roche \"ECLIA\" assay is used. Results obtained with different assay methods   cannot be used interchangeably.        Medications:    Current Outpatient Medications:     tolterodine (DETROL LA) 2 MG extended release capsule, Take 1 capsule by mouth daily, Disp: 30 capsule, Rfl: 5    leuprolide (LUPRON DEPOT, 1-MONTH,) 7.5 MG injection, Inject 7.5 mg into the muscle every 28 days, Disp: 1 kit, Rfl: 11    lisinopril (PRINIVIL;ZESTRIL) 10 MG tablet, Takes 2 pills in the am and 1 and 1/2 pills in the PM, Disp: 90 tablet, Rfl: 3    allopurinol (ZYLOPRIM) 300 MG tablet, Take 3/4 tablet daily, Disp: 90 tablet, Rfl: 3    amLODIPine (NORVASC) 5 MG tablet, Take 1 tablet by mouth daily, Disp: 90 tablet, Rfl: 3    cloNIDine (CATAPRES) 0.1 MG tablet, Take 1 tablet by mouth daily as needed for High Blood Pressure (150/90 or above), Disp: 90 tablet, Rfl: 3    flecainide (TAMBOCOR) 100 MG tablet, Take 1 tablet by mouth 2 times daily, Disp: 180 tablet, Rfl: 3    furosemide (LASIX) 20 MG tablet, Take 1 tablet by mouth daily, Disp: 90 tablet, Rfl: 3    meloxicam (MOBIC) 7.5 MG tablet, Take 1 tablet by mouth 2 times daily, Disp: 180 tablet, Rfl: 3    rosuvastatin (CRESTOR) 10 MG tablet, Take 1 tablet by mouth daily, Disp: 90 tablet, Rfl: 3    zolpidem (AMBIEN) 5 MG tablet, Indications: pt take 1 1/2 a day Patient states that he takes 1.5 pills every Night, Disp: 135 tablet, Rfl: 1    Multiple Vitamin (MULTI-VITAMIN) TABS, Take 1 tablet by mouth, Disp: , Rfl:     nitroGLYCERIN (NITROSTAT) 0.4 MG SL tablet, Place 0.4 mg under the tongue every 5 minutes as needed , Disp: , Rfl:     Pain Plan:  Change in medication (see below)       Patient Currently in Pain: Denies         Immunizations/Smoking Status:  Immunization History   Administered Date(s) Administered    Hepatitis B, unspecified formulation 2012, 2012, 2012, 2013    Influenza Vaccine, unspecified formulation 2012, 10/11/2013, 10/10/2014    Influenza, High Dose (Fluzone 65 yrs and older) 10/06/2015, 10/11/2016, 2017, 2018    MMR 10/22/2007, 2012    Pneumococcal 13-valent Conjugate (Llkmwdm53) 10/06/2015    Pneumococcal Polysaccharide (Hzpknnfiv29) 2012, 2017    Tdap (Boostrix, Adacel) 05/10/2017    Varicella (Varivax) 2016    Zoster Subunit (Shingrix) 2018, 2018       Social History     Tobacco Use   Smoking Status Never Smoker   Smokeless Tobacco Never Used     Counseling given: Not Answered      PHYSICAL FINDINGS:    CHAPERONE: Not Required    ECO Symptomatic but completely ambulatory      Vital Signs:  /68   Pulse 60   Temp 98 °F (36.7 °C)   Resp 18   Wt 228 lb (103.4 kg)   SpO2 100%   BMI 35.71 kg/m²   Wt Readings from Last 5 Encounters:   19 228 lb (103.4 kg)   19 227 lb (103 kg)   19 225 lb (102.1 kg)   19 225 lb (102.1 kg)   19 222 lb (100.7 kg)     The patient is in no acute distress, oriented in time, person and place. Mucous membrane is pink, moist and anicteric. ASSESSMENT PLAN: The patient is used to experience stress incontinence together with urgency.   He has tried Azo and Pyridium without much clinical effect. A prescription for Detrol will be sent to his pharmacy. The patient also has diarrhea which is a well controlled with Imodium. We will continue radiation therapy as planned. Electronically signed by Juan Scott MD on 5/21/2019 at 4:39 PM  [address]    Drugs Prescribed:  New Prescriptions    TOLTERODINE (DETROL LA) 2 MG EXTENDED RELEASE CAPSULE    Take 1 capsule by mouth daily       Other Orders Placed:  No orders of the defined types were placed in this encounter.

## 2019-05-21 NOTE — PROGRESS NOTES
Chetan Persons  5/21/2019  Wt Readings from Last 3 Encounters:   05/21/19 228 lb (103.4 kg)   05/13/19 227 lb (103 kg)   05/06/19 225 lb (102.1 kg)     Body mass index is 35.71 kg/m². Treatment Area:prostate bed     Patient was seen today for weekly visit.      Comfort Alteration    Fatigue: Mild    Nutritional Alteration  Anorexia: No  Nausea: No  Vomiting: No     Elimination Alterations  Constipation: no  Diarrhea:  yes  Urinary Frequency/Urgency: Yes  Urinary Retention: No  Dysuria: Yes  Urinary Incontinence: Yes  Proctitis: No  Nocturia: Yes #/night: 4     Skin Alteration   Sensation:intact     Radiation Dermatitis:  none    Emotional  Coping: effective    Sexuality Alteration  absent    Injury, potential bleeding or infection: none     Lab Results   Component Value Date    WBC 5.5 04/03/2019     04/03/2019         /68   Pulse 60   Temp 98 °F (36.7 °C)   Resp 18   Wt 228 lb (103.4 kg)   SpO2 100%   BMI 35.71 kg/m²       Assessment/Plan: Dr Yoanna Hassan notified and examined pt     Jeris Scales

## 2019-05-22 ENCOUNTER — HOSPITAL ENCOUNTER (OUTPATIENT)
Dept: RADIATION ONCOLOGY | Age: 69
Discharge: HOME | End: 2019-05-22
Attending: RADIOLOGY
Payer: COMMERCIAL

## 2019-05-22 PROCEDURE — 77385 HC NTSTY MODUL RAD TX DLVR SMPL: CPT | Performed by: RADIOLOGY

## 2019-05-23 ENCOUNTER — HOSPITAL ENCOUNTER (OUTPATIENT)
Dept: RADIATION ONCOLOGY | Age: 69
Discharge: HOME | End: 2019-05-23
Attending: RADIOLOGY
Payer: COMMERCIAL

## 2019-05-23 PROCEDURE — 77385 HC NTSTY MODUL RAD TX DLVR SMPL: CPT | Performed by: RADIOLOGY

## 2019-05-23 PROCEDURE — 77336 RADIATION PHYSICS CONSULT: CPT | Performed by: RADIOLOGY

## 2019-05-24 ENCOUNTER — HOSPITAL ENCOUNTER (OUTPATIENT)
Dept: RADIATION ONCOLOGY | Age: 69
Discharge: HOME | End: 2019-05-24
Attending: RADIOLOGY
Payer: COMMERCIAL

## 2019-05-24 PROCEDURE — 77385 HC NTSTY MODUL RAD TX DLVR SMPL: CPT | Performed by: RADIOLOGY

## 2019-05-28 ENCOUNTER — HOSPITAL ENCOUNTER (OUTPATIENT)
Dept: RADIATION ONCOLOGY | Age: 69
Discharge: HOME | End: 2019-05-28
Attending: RADIOLOGY
Payer: COMMERCIAL

## 2019-05-28 ENCOUNTER — TELEPHONE (OUTPATIENT)
Dept: UROLOGY | Age: 69
End: 2019-05-28

## 2019-05-28 VITALS
RESPIRATION RATE: 14 BRPM | BODY MASS INDEX: 34.46 KG/M2 | WEIGHT: 220 LBS | HEART RATE: 69 BPM | TEMPERATURE: 97.9 F | DIASTOLIC BLOOD PRESSURE: 83 MMHG | OXYGEN SATURATION: 99 % | SYSTOLIC BLOOD PRESSURE: 149 MMHG

## 2019-05-28 DIAGNOSIS — C61 PROSTATE CANCER (HCC): Primary | ICD-10-CM

## 2019-05-28 PROCEDURE — 77385 HC NTSTY MODUL RAD TX DLVR SMPL: CPT | Performed by: RADIOLOGY

## 2019-05-28 RX ORDER — DIPHENOXYLATE HYDROCHLORIDE AND ATROPINE SULFATE 2.5; .025 MG/1; MG/1
1 TABLET ORAL 4 TIMES DAILY PRN
Qty: 30 TABLET | Refills: 1 | Status: SHIPPED | OUTPATIENT
Start: 2019-05-28 | End: 2019-05-29

## 2019-05-28 ASSESSMENT — PAIN DESCRIPTION - DESCRIPTORS: DESCRIPTORS: CRAMPING

## 2019-05-28 ASSESSMENT — PAIN DESCRIPTION - LOCATION: LOCATION: ABDOMEN

## 2019-05-28 NOTE — TELEPHONE ENCOUNTER
The patient left a message stating he will be done with radiation on 06/05/2019. He has an appointment with this office on 06/12/2019. When do you want the PSA done? Does his appointment need to be changed? Please advise. Thank you.

## 2019-05-28 NOTE — PROGRESS NOTES
Arnita Leventhal  5/28/2019  Wt Readings from Last 3 Encounters:   05/28/19 220 lb (99.8 kg)   05/21/19 228 lb (103.4 kg)   05/13/19 227 lb (103 kg)     Body mass index is 34.46 kg/m². Treatment Area:prostate bed     Patient was seen today for weekly visit. Comfort Alteration    Fatigue: Mild    Nutritional Alteration  Anorexia: No  Nausea: No  Vomiting: No     Elimination Alterations  Constipation: no  Diarrhea:  yes  Urinary Frequency/Urgency: Yes  Urinary Retention: No  Dysuria: Yes  Urinary Incontinence: Yes  Proctitis: No  Nocturia: Yes #/night: 4     Skin Alteration   Sensation:intact     Radiation Dermatitis:  None     Emotional  Coping: effective    Sexuality Alteration  absent    Injury, potential bleeding or infection: none     Lab Results   Component Value Date    WBC 5.5 04/03/2019     04/03/2019         BP (!) 149/83   Pulse 69   Temp 97.9 °F (36.6 °C)   Resp 14   Wt 220 lb (99.8 kg)   SpO2 99%   BMI 34.46 kg/m²       Assessment/Plan: Dr Praneeth Colvin notified and examined pt.  No new orders received     Finley Leyden
tablet, Take 1 tablet by mouth 2 times daily, Disp: 180 tablet, Rfl: 3    rosuvastatin (CRESTOR) 10 MG tablet, Take 1 tablet by mouth daily, Disp: 90 tablet, Rfl: 3    zolpidem (AMBIEN) 5 MG tablet, Indications: pt take 1 1/2 a day Patient states that he takes 1.5 pills every Night, Disp: 135 tablet, Rfl: 1    Multiple Vitamin (MULTI-VITAMIN) TABS, Take 1 tablet by mouth, Disp: , Rfl:     nitroGLYCERIN (NITROSTAT) 0.4 MG SL tablet, Place 0.4 mg under the tongue every 5 minutes as needed , Disp: , Rfl:     Pain Plan:  Change in medication (see below)       Patient Currently in Pain: Yes  Pain Assessment: 0-10      Immunizations/Smoking Status:  Immunization History   Administered Date(s) Administered    Hepatitis B, unspecified formulation 2012, 2012, 2012, 2013    Influenza Vaccine, unspecified formulation 2012, 10/11/2013, 10/10/2014    Influenza, High Dose (Fluzone 65 yrs and older) 10/06/2015, 10/11/2016, 2017, 2018    MMR 10/22/2007, 2012    Pneumococcal 13-valent Conjugate (Cenzltn46) 10/06/2015    Pneumococcal Polysaccharide (Dgveicftt89) 2012, 2017    Tdap (Boostrix, Adacel) 05/10/2017    Varicella (Varivax) 2016    Zoster Subunit (Shingrix) 2018, 2018       Social History     Tobacco Use   Smoking Status Never Smoker   Smokeless Tobacco Never Used     Counseling given: Not Answered      PHYSICAL FINDINGS:    CHAPERONE: Not Required    ECO Symptomatic but completely ambulatory      Vital Signs:  BP (!) 149/83   Pulse 69   Temp 97.9 °F (36.6 °C)   Resp 14   Wt 220 lb (99.8 kg)   SpO2 99%   BMI 34.46 kg/m²   Wt Readings from Last 5 Encounters:   19 220 lb (99.8 kg)   19 228 lb (103.4 kg)   19 227 lb (103 kg)   19 225 lb (102.1 kg)   19 225 lb (102.1 kg)     The patient is in no acute distress, oriented in time, person and place. Abdomen is soft and nontender.     ASSESSMENT PLAN:

## 2019-05-29 ENCOUNTER — HOSPITAL ENCOUNTER (OUTPATIENT)
Dept: RADIATION ONCOLOGY | Age: 69
Discharge: HOME | End: 2019-05-29
Attending: RADIOLOGY
Payer: COMMERCIAL

## 2019-05-29 ENCOUNTER — HOSPITAL ENCOUNTER (OUTPATIENT)
Dept: INFUSION THERAPY | Age: 69
Discharge: HOME | End: 2019-05-29
Payer: COMMERCIAL

## 2019-05-29 DIAGNOSIS — C61 PROSTATE CANCER (HCC): Primary | ICD-10-CM

## 2019-05-29 PROCEDURE — 77385 HC NTSTY MODUL RAD TX DLVR SMPL: CPT | Performed by: RADIOLOGY

## 2019-05-29 PROCEDURE — 96402 CHEMO HORMON ANTINEOPL SQ/IM: CPT

## 2019-05-29 RX ORDER — DIPHENOXYLATE HYDROCHLORIDE AND ATROPINE SULFATE 2.5; .025 MG/1; MG/1
1 TABLET ORAL 4 TIMES DAILY PRN
Qty: 30 TABLET | Refills: 1 | Status: SHIPPED | OUTPATIENT
Start: 2019-05-29 | End: 2019-06-08

## 2019-05-29 NOTE — PROGRESS NOTES
Pt here for Lupron injection. Denies any complaints. Pt tolerated injection without incident and was d/c'd in stable condition. Will return on 6/26/19 for next Lupron injection.

## 2019-05-30 ENCOUNTER — HOSPITAL ENCOUNTER (OUTPATIENT)
Dept: RADIATION ONCOLOGY | Age: 69
Discharge: HOME | End: 2019-05-30
Attending: RADIOLOGY
Payer: COMMERCIAL

## 2019-05-30 PROCEDURE — 77385 HC NTSTY MODUL RAD TX DLVR SMPL: CPT | Performed by: RADIOLOGY

## 2019-05-31 ENCOUNTER — HOSPITAL ENCOUNTER (OUTPATIENT)
Dept: RADIATION ONCOLOGY | Age: 69
Discharge: HOME | End: 2019-05-31
Attending: RADIOLOGY
Payer: COMMERCIAL

## 2019-05-31 PROCEDURE — 77385 HC NTSTY MODUL RAD TX DLVR SMPL: CPT | Performed by: RADIOLOGY

## 2019-06-03 ENCOUNTER — HOSPITAL ENCOUNTER (OUTPATIENT)
Dept: RADIATION ONCOLOGY | Age: 69
Discharge: HOME | End: 2019-06-03
Attending: RADIOLOGY
Payer: COMMERCIAL

## 2019-06-03 VITALS
TEMPERATURE: 97.9 F | RESPIRATION RATE: 16 BRPM | DIASTOLIC BLOOD PRESSURE: 89 MMHG | SYSTOLIC BLOOD PRESSURE: 160 MMHG | OXYGEN SATURATION: 98 % | HEART RATE: 68 BPM

## 2019-06-03 DIAGNOSIS — C61 PROSTATE CANCER (HCC): Primary | ICD-10-CM

## 2019-06-03 PROCEDURE — 77385 HC NTSTY MODUL RAD TX DLVR SMPL: CPT | Performed by: RADIOLOGY

## 2019-06-03 PROCEDURE — 77336 RADIATION PHYSICS CONSULT: CPT | Performed by: RADIOLOGY

## 2019-06-03 NOTE — PROGRESS NOTES
Rekha Saleh M.D. Bianca Corporal. Renaldo Cramer, Ph.D., M.D., Colletta Males, M.D. Damaris Rodriguez, Ph.D., M.D. Michael Andrade M.D. Date of Service: 6/3/2019    Location:  [address] [place] [address],   [address] [facility] [facility]   [phone]     RADIATION ONCOLOGY WEEKLY PROGRESS NOTE    Patient ID:   Zhen Art  : 1950   MRN: [id number]    Diagnosis:  Cancer Staging  Prostate cancer [facility])  Staging form: Prostate, AJCC 8th Edition  - Clinical: No stage assigned - Unsigned  - Pathologic stage from 3/29/2019: Stage IIIB (pT3a, pN0, cM0, PSA: 3.9, Grade Group: 3) - Signed by Michael Andrade MD on 3/29/2019      Radiation Treatment Information:   Actual Dose: 4500 cGy, 1800 cGy boost  Total Planned Dose: 4500 cGy, 2160 centigrays boost  Treatment Site: Pelvis/prostate and seminal vesicle bed, prostate bed boost    IMAGING:   CBCT    CHEMOTHERAPY UPDATE:   Treatment Summary   Plan Name LEUPROLIDE (LUPRON DEPOT) IM   Treatment goal [No plan goal]   Provider Emil Toribio MD   Status Active   Start Date [No treatment day found]   End Date 2019   Treatment plan weight/height/BSA [Plan weight, height, and BSA not specified]   Most recent weight/height/BSA Weight: Weight: 220 lb (99.8 kg)    Height: Height: 5' 7\" (1.702 m)    BSA: BSA (Calculated - sq m): 2.18 sq meters      Treatment on Clinical Trial? [This plan is not part of a research study]   Reason for stopping treatment [Plan is still active]   Treatment Plan Medications leuprolide (LUPRON) injection 7.5 mg, 7.5 mg, Intramuscular, ONCE, 1 of 1 cycle  Administration: 7.5 mg (4/3/2019), 7.5 mg (2019), 7.5 mg (2019)           SUBJECTIVE: Zhen Art  continues to experience occasional diarrhea and bladder spasms. He however noted some improvement to with low maternal and Detrol. He denies any dysuria and had one episode of hematuria.   He denies any abdominal or pelvic pains.    OBJECTIVE:     Labs:  WBC   Date Value Ref Range Status   04/03/2019 5.5 3.5 - 11.3 k/uL Final     Segs Absolute   Date Value Ref Range Status   04/03/2019 3.36 1.50 - 8.10 k/uL Final     Hemoglobin   Date Value Ref Range Status   04/03/2019 14.3 13.0 - 17.0 g/dL Final     Platelets   Date Value Ref Range Status   04/03/2019 219 138 - 453 k/uL Final   [unfilled]  PSA   Date Value Ref Range Status   03/26/2019 <0.01 <4.1 ug/L Final     Comment:     The Roche \"ECLIA\" assay is used. Results obtained with different assay methods cannot be   used interchangeably. 01/15/2019 <0.01 <4.1 ug/L Final     Comment:     The Roche \"ECLIA\" assay is used. Results obtained with different assay methods   cannot be used interchangeably. 11/05/2018 3.85 <4.1 ug/L Final     Comment:     The Roche \"ECLIA\" assay is used. Results obtained with different assay methods   cannot be used interchangeably. Medications:    Current Outpatient Medications:     diphenoxylate-atropine (DIPHENATOL) 2.5-0.025 MG per tablet, Take 1 tablet by mouth 4 times daily as needed for Diarrhea for up to 10 days. , Disp: 30 tablet, Rfl: 1    tolterodine (DETROL LA) 2 MG extended release capsule, Take 1 capsule by mouth daily, Disp: 30 capsule, Rfl: 5    leuprolide (LUPRON DEPOT, 1-MONTH,) 7.5 MG injection, Inject 7.5 mg into the muscle every 28 days, Disp: 1 kit, Rfl: 11    lisinopril (PRINIVIL;ZESTRIL) 10 MG tablet, Takes 2 pills in the am and 1 and 1/2 pills in the PM, Disp: 90 tablet, Rfl: 3    allopurinol (ZYLOPRIM) 300 MG tablet, Take 3/4 tablet daily, Disp: 90 tablet, Rfl: 3    amLODIPine (NORVASC) 5 MG tablet, Take 1 tablet by mouth daily, Disp: 90 tablet, Rfl: 3    cloNIDine (CATAPRES) 0.1 MG tablet, Take 1 tablet by mouth daily as needed for High Blood Pressure (150/90 or above), Disp: 90 tablet, Rfl: 3    flecainide (TAMBOCOR) 100 MG tablet, Take 1 tablet by mouth 2 times daily, Disp: 180 tablet, Rfl: 3    furosemide (LASIX) 20 MG tablet, Take 1 tablet by mouth daily, Disp: 90 tablet, Rfl: 3    meloxicam (MOBIC) 7.5 MG tablet, Take 1 tablet by mouth 2 times daily, Disp: 180 tablet, Rfl: 3    rosuvastatin (CRESTOR) 10 MG tablet, Take 1 tablet by mouth daily, Disp: 90 tablet, Rfl: 3    zolpidem (AMBIEN) 5 MG tablet, Indications: pt take 1 1/2 a day Patient states that he takes 1.5 pills every Night, Disp: 135 tablet, Rfl: 1    Multiple Vitamin (MULTI-VITAMIN) TABS, Take 1 tablet by mouth, Disp: , Rfl:     nitroGLYCERIN (NITROSTAT) 0.4 MG SL tablet, Place 0.4 mg under the tongue every 5 minutes as needed , Disp: , Rfl:     Pain Plan:  Patient is satisfied with current level of pain control       Patient Currently in Pain: Denies         Immunizations/Smoking Status:  Immunization History   Administered Date(s) Administered    Hepatitis B, unspecified formulation 2012, 2012, 2012, 2013    Influenza Vaccine, unspecified formulation 2012, 10/11/2013, 10/10/2014    Influenza, High Dose (Fluzone 65 yrs and older) 10/06/2015, 10/11/2016, 2017, 2018    MMR 10/22/2007, 2012    Pneumococcal 13-valent Conjugate (Nysycrb11) 10/06/2015    Pneumococcal Polysaccharide (Brlouauwl05) 2012, 2017    Tdap (Boostrix, Adacel) 05/10/2017    Varicella (Varivax) 2016    Zoster Subunit (Shingrix) 2018, 2018       Social History     Tobacco Use   Smoking Status Never Smoker   Smokeless Tobacco Never Used     Counseling given: Not Answered      PHYSICAL FINDINGS:    CHAPERONE: Not Required    ECO Symptomatic but completely ambulatory      Vital Signs:  BP (!) 160/89   Pulse 68   Temp 97.9 °F (36.6 °C) (Oral)   Resp 16   SpO2 98%   Wt Readings from Last 5 Encounters:   19 220 lb (99.8 kg)   19 228 lb (103.4 kg)   19 227 lb (103 kg)   19 225 lb (102.1 kg)   19 225 lb (102.1 kg)     The patient is in no acute distress, oriented in time, person and place. Mucous membrane is pink, moist and anicteric. Abdomen is soft and nontender. Bowel sounds intact. ASSESSMENT PLAN: The patient is tolerating radiation therapy relatively well. He did note occasional diarrhea with abdominal cramps and bladder cramps. He however notes that these have improved since starting Detrol and Lomotil. We will continue radiation therapy as planned. The patient will complete treatment in 2 days and will be given a 3 month follow-up appointment. Electronically signed by Paradise Madden MD on 6/3/2019 at 4:20 PM  [address]    Drugs Prescribed:  New Prescriptions    No medications on file       Other Orders Placed:  No orders of the defined types were placed in this encounter.

## 2019-06-03 NOTE — PROGRESS NOTES
Osmar Hernandez  6/3/2019  Wt Readings from Last 3 Encounters:   05/28/19 220 lb (99.8 kg)   05/21/19 228 lb (103.4 kg)   05/13/19 227 lb (103 kg)     There is no height or weight on file to calculate BMI. Treatment Area: prostate bed     Patient was seen today for weekly visit. Comfort Alteration    Fatigue: Mild    Nutritional Alteration  Anorexia: No  Nausea: No  Vomiting: No     Elimination Alterations  Constipation: no  Diarrhea:  Yes   Urinary Frequency/Urgency: Yes   Urinary Retention: No  Dysuria: Yes   Urinary Incontinence: Yes   Proctitis: No  Nocturia: Yes #/night: 5     Skin Alteration   Sensation:intact     Radiation Dermatitis:  None     Emotional  Coping: effective    Sexuality Alteration  absent    Injury, potential bleeding or infection: none     Lab Results   Component Value Date    WBC 5.5 04/03/2019     04/03/2019         BP (!) 160/89   Pulse 68   Temp 97.9 °F (36.6 °C) (Oral)   Resp 16   SpO2 98%       Assessment/Plan: Dr Fadi Warren notified and examined pt. Pt completes treatments this week. Pt to f/u in 3 months.      Alice Ball

## 2019-06-04 ENCOUNTER — HOSPITAL ENCOUNTER (OUTPATIENT)
Dept: RADIATION ONCOLOGY | Age: 69
Discharge: HOME | End: 2019-06-04
Attending: RADIOLOGY
Payer: COMMERCIAL

## 2019-06-04 PROCEDURE — 77385 HC NTSTY MODUL RAD TX DLVR SMPL: CPT | Performed by: RADIOLOGY

## 2019-06-05 ENCOUNTER — HOSPITAL ENCOUNTER (OUTPATIENT)
Dept: RADIATION ONCOLOGY | Age: 69
Discharge: HOME | End: 2019-06-05
Attending: RADIOLOGY
Payer: COMMERCIAL

## 2019-06-05 PROCEDURE — 77385 HC NTSTY MODUL RAD TX DLVR SMPL: CPT | Performed by: RADIOLOGY

## 2019-06-11 ENCOUNTER — HOSPITAL ENCOUNTER (OUTPATIENT)
Age: 69
Discharge: HOME | End: 2019-06-11
Payer: COMMERCIAL

## 2019-06-11 DIAGNOSIS — C61 PROSTATE CANCER (HCC): ICD-10-CM

## 2019-06-11 LAB — PROSTATE SPECIFIC ANTIGEN: <0.01 UG/L

## 2019-06-11 PROCEDURE — 84153 ASSAY OF PSA TOTAL: CPT

## 2019-06-11 PROCEDURE — 36415 COLL VENOUS BLD VENIPUNCTURE: CPT

## 2019-06-12 ENCOUNTER — OFFICE VISIT (OUTPATIENT)
Dept: UROLOGY | Age: 69
End: 2019-06-12
Payer: COMMERCIAL

## 2019-06-12 VITALS
DIASTOLIC BLOOD PRESSURE: 80 MMHG | WEIGHT: 213 LBS | SYSTOLIC BLOOD PRESSURE: 122 MMHG | HEIGHT: 68 IN | BODY MASS INDEX: 32.28 KG/M2

## 2019-06-12 DIAGNOSIS — C61 PROSTATE CANCER (HCC): Primary | ICD-10-CM

## 2019-06-12 LAB
BILIRUBIN URINE: NEGATIVE
BLOOD URINE, POC: ABNORMAL
CHARACTER, URINE: CLEAR
COLOR, URINE: YELLOW
GLUCOSE URINE: NEGATIVE MG/DL
KETONES, URINE: NEGATIVE
LEUKOCYTE CLUMPS, URINE: ABNORMAL
NITRITE, URINE: NEGATIVE
PH, URINE: 5.5 (ref 5–9)
PROTEIN, URINE: 30 MG/DL
SPECIFIC GRAVITY, URINE: 1.01 (ref 1–1.03)
UROBILINOGEN, URINE: 0.2 EU/DL (ref 0–1)

## 2019-06-12 PROCEDURE — 99213 OFFICE O/P EST LOW 20 MIN: CPT | Performed by: NURSE PRACTITIONER

## 2019-06-12 PROCEDURE — 81003 URINALYSIS AUTO W/O SCOPE: CPT | Performed by: NURSE PRACTITIONER

## 2019-06-12 NOTE — PROGRESS NOTES
[address]  [address] [facility]  Dept: [phone]  Dept Fax: [phone] : [phone]      Visit Date: 6/12/2019    HPI:     Uday Govea presents for follow-up of prostate cancer. He underwent RALRP with bilateral pelvic lymph node dissection, and placement of umbilical cord bilateral nerve bundles on 11/9/18 per Dr. Sindhu Weiss. Pathology showed Althea 4+3=7 prostate cancer with perineural invasion and extraprostatic extension, pT3aN0. He reports that he continues to have incontinence but it is slowly improving. He is taking sildenafil 20 mg daily for ED. He completed adjunct radiation on 6/5/19. He reports he has continued diarrhea and bladder spasms. He is taking Detrol 2 mg XL daily with minimal relief. He believes the diarrhea is slowly improving. He plans to return to work later this month. He is seeing Medical Oncology and they have started him on monthly Lupron. He reports he had severe fatigue after the first injection but is now doing better.         Past Medical History:   Diagnosis Date    Abdominal pain 11/14/2018    Benign essential HTN 6/1/2018    Cough 11/15/2018    Elevated CPK 4/5/2013    Elevated PSA measurement 3/23/2018    Frequent PVCs 6/1/2018    Gout     Hyperlipidemia 11/23/2018    Hypertension     Leukocytosis 11/15/2018    Need for prophylactic vaccination and inoculation against varicella 6/1/2018    Osteoarthritis     Osteoarthrosis 11/23/2018    Primary insomnia     Prostate cancer ([facility] [address] 11/4/2018    Rising PSA level 5/10/2018    Sleep apnea       Past Surgical History:   Procedure Laterality Date    HAND SURGERY      HIP SURGERY  2012    ID LAP,PROSTATECTOMY,RADICAL,W/NERVE SPARE,INCL ROBOTIC N/A 11/9/2018    ROBOTIC RADICAL PROSTATECTOMY WITH PELVIC LYMPH NODES DISSECTION performed by Cayetano Osler, MD at [place] [address] History   Problem Relation Age of Onset    Prostate Cancer Father 71    Kidney Cancer Father         62s     Cancer Maternal Aunt         d. 48 unknown cancer     Prostate Cancer Paternal Grandfather 76       Social History     Tobacco Use    Smoking status: Never Smoker    Smokeless tobacco: Never Used   Substance Use Topics    Alcohol use: Yes     Alcohol/week: 1.2 oz     Types: 2 Cans of beer per week          ROS:  Constitutional: Negative for chills, fatigue, fever, or weight loss. Eyes: Denies reported visual changes. ENT: Denies headache, difficulty swallowing, nose bleeds, ringing in ears, or earaches. Cardiovascular: Negative for chest pain, palpitations, tachycardia or edema. Respiratory: Denies cough or SOB. GI:The patient denies abdominal or flank pain, anorexia, nausea or vomiting. : See HPI  Musculoskeletal: Patient denies low back pain or painful or reduced ROM of the back or extremities. Neurological: The patient denies any symptoms of neurological impairment or TIA's; no history of stroke. Lymphatic: Denies swollen glands in neck, axillary or inguinal areas. Psychiatric: Denies anxiety or depression. Skin: Denies rash or lesions. The remainder of the complete ROS is negative    PHYSICAL EXAM:  VITALS:  There were no vitals taken for this visit. .    Constitutional:    Alert and oriented times 3, no acute distress and cooperative to examination with appropriate mood and affect. HEENT:   Head:         Normocephalic and atraumatic. Mouth/Throat:          Mucous membranes are normal.   Eyes:         EOM are normal. No scleral icterus. Nose:    The external appearance of the nose is normal  Ears: The ears appear normal to external inspection   Neck:         Supple, symmetrical, trachea midline, no adenopathy, thyroid symmetric, not enlarged and no tenderness. Cardiovascular:        Normal rate, regular rhythm, S1 S2 heart sounds.     Pulmonary/Chest:       Chest symmetric with normal A/P diameter, no wheezes, rales, or rhonchi noted. Normal respiratory rate and rhthym. No use of accessory muscles. Abdominal:          Soft. No tenderness. Active bowel sounds. Musculoskeletal:    Normal range of motion. She exhibits no edema or tenderness of lower extremities. Extremities:    No cyanosis, clubbing, or edema present. Neurological:    Alert and oriented. No cranial nerve deficit. There are no focalizing motor or sensory deficits.     DATA:  CBC:   Lab Results   Component Value Date    WBC 5.5 04/03/2019    RBC 4.64 04/03/2019    HGB 14.3 04/03/2019    HCT 43.0 04/03/2019    MCV 92.7 04/03/2019    MCH 30.8 04/03/2019    MCHC 33.3 04/03/2019    RDW 14.5 04/03/2019     04/03/2019    MPV 10.5 04/03/2019     BMP:    Lab Results   Component Value Date     11/17/2018    K 3.5 11/17/2018     11/17/2018    CO2 22 11/17/2018    BUN 7 11/17/2018    CREATININE 0.96 04/03/2019    CALCIUM 8.6 11/17/2018    GFRAA >60 04/03/2019    LABGLOM >60 04/03/2019    LABGLOM 84 11/11/2018    GLUCOSE 95 11/17/2018     BUN/Creatinine:    Lab Results   Component Value Date    BUN 7 11/17/2018    CREATININE 0.96 04/03/2019     Magnesium:    Lab Results   Component Value Date    MG 2.3 11/17/2018     Phosphorus:  No results found for: PHOS  PT/INR:    Lab Results   Component Value Date    PROTIME 10.8 11/14/2018    INR 1.0 11/14/2018     U/A:    Lab Results   Component Value Date    NITRITE neg 06/01/2018    COLORU Yellow 11/14/2018    PHUR 7.0 11/14/2018    WBCUA 0-2 11/14/2018    RBCUA 5-10 11/14/2018    BACTERIA Rare 11/14/2018    CLARITYU Clear 11/14/2018    SPECGRAV 1.007 11/14/2018    LEUKOCYTESUR TRACE 11/14/2018    UROBILINOGEN 0.2 11/14/2018    BILIRUBINUR Negative 11/14/2018    BILIRUBINUR neg 06/01/2018    BLOODU LARGE 11/14/2018    GLUCOSEU Negative 11/14/2018         ADDENDUM REPORT 12/12/18:    FINAL DIAGNOSIS:  A.  Prostate, radical prostatectomy:      Adenocarcinoma of the prostate, Althea score 4+3 = 7, involving      approximately 5% of prostatic tissue, with perineural invasion and      extraprostatic extension,      stage pT3aN0.   Margins negative for carcinoma.      High-grade prostatic intraepithelial neoplasia.   Glandular and stromal hyperplasia. B.  Lymph nodes, left pelvic, excision:   One lymph node, negative for malignancy (0/1). C.  Lymph nodes, right pelvic, excision:   Two lymph nodes, negative for malignancy (0/2). Lab Results   Component Value Date    PSA <0.01 06/11/2019    PSA <0.01 03/26/2019    PSA <0.01 01/15/2019     Results for POC orders placed in visit on 06/12/19   POCT Urinalysis No Micro (Auto)   Result Value Ref Range    Glucose, Ur Negative NEGATIVE mg/dl    Bilirubin Urine Negative     Ketones, Urine Negative NEGATIVE    Specific Gravity, Urine 1.015 1.002 - 1.03    Blood, UA POC Moderate (A) NEGATIVE    pH, Urine 5.50 5.0 - 9.0    Protein, Urine 30 (A) NEGATIVE mg/dl    Urobilinogen, Urine 0.20 0.0 - 1.0 eu/dl    Nitrite, Urine Negative NEGATIVE    Leukocyte Clumps, Urine Small (A) NEGATIVE    Color, Urine Yellow YELLOW-STR    Character, Urine Clear [id number]       Assessment & Plan:      Prostate Cancer pT3a N0    Decipher showed high risk. PSA remains undetectable showing great cancer control. He completed adjunct radiation on 6/5/19. Follow-up with Dr. Starr Gandara for Lupron injections. He is unable to tolerate sildenafil. We discussed Trimix & Edex, however, he would like to get over radiation side effects first.    Follow-up in 3 months with PSA prior.           Electronically signed by JHONNY Cox CNP on 6/12/2019 at 1:16 PM

## 2019-06-26 ENCOUNTER — HOSPITAL ENCOUNTER (OUTPATIENT)
Dept: INFUSION THERAPY | Age: 69
Discharge: HOME | End: 2019-06-26
Payer: COMMERCIAL

## 2019-06-26 DIAGNOSIS — C61 PROSTATE CANCER (HCC): Primary | ICD-10-CM

## 2019-06-26 PROCEDURE — 96402 CHEMO HORMON ANTINEOPL SQ/IM: CPT

## 2019-08-07 ENCOUNTER — HOSPITAL ENCOUNTER (OUTPATIENT)
Dept: INFUSION THERAPY | Age: 69
Discharge: HOME | End: 2019-08-07
Payer: COMMERCIAL

## 2019-08-07 DIAGNOSIS — C61 PROSTATE CANCER (HCC): Primary | ICD-10-CM

## 2019-08-07 PROCEDURE — 96402 CHEMO HORMON ANTINEOPL SQ/IM: CPT

## 2019-08-07 PROCEDURE — 6360000002 HC RX W HCPCS: Performed by: INTERNAL MEDICINE

## 2019-08-07 RX ADMIN — LEUPROLIDE ACETATE 7.5 MG: KIT at 13:54

## 2019-09-04 ENCOUNTER — HOSPITAL ENCOUNTER (OUTPATIENT)
Dept: INFUSION THERAPY | Age: 69
Discharge: HOME | End: 2019-09-04
Payer: COMMERCIAL

## 2019-09-04 ENCOUNTER — TELEPHONE (OUTPATIENT)
Dept: PRIMARY CARE CLINIC | Age: 69
End: 2019-09-04

## 2019-09-04 DIAGNOSIS — Z00.00 HEALTHCARE MAINTENANCE: Primary | ICD-10-CM

## 2019-09-04 DIAGNOSIS — C61 PROSTATE CANCER (HCC): Primary | ICD-10-CM

## 2019-09-04 PROCEDURE — 96402 CHEMO HORMON ANTINEOPL SQ/IM: CPT

## 2019-09-04 PROCEDURE — 96401 CHEMO ANTI-NEOPL SQ/IM: CPT

## 2019-09-04 NOTE — PROGRESS NOTES
Pt here for Lupron injection. C/o fatigue. Injection given without incident and pt d/c'd in stable condition. Pt will return next wk for f/u with RO and Dr. Sandoval Coleman.

## 2019-09-05 ENCOUNTER — HOSPITAL ENCOUNTER (OUTPATIENT)
Age: 69
Discharge: HOME | End: 2019-09-05
Payer: COMMERCIAL

## 2019-09-05 DIAGNOSIS — Z00.00 HEALTHCARE MAINTENANCE: Primary | ICD-10-CM

## 2019-09-05 DIAGNOSIS — Z00.00 HEALTHCARE MAINTENANCE: ICD-10-CM

## 2019-09-05 DIAGNOSIS — C61 PROSTATE CANCER (HCC): ICD-10-CM

## 2019-09-05 LAB
ABSOLUTE EOS #: 0.14 K/UL (ref 0–0.4)
ABSOLUTE IMMATURE GRANULOCYTE: 0 K/UL (ref 0–0.3)
ABSOLUTE LYMPH #: 0.39 K/UL (ref 1–4.8)
ABSOLUTE MONO #: 0.53 K/UL (ref 0.1–0.8)
ALBUMIN SERPL-MCNC: 4.4 G/DL (ref 3.5–5.2)
ALBUMIN/GLOBULIN RATIO: 1.5 (ref 1–2.5)
ALP BLD-CCNC: 75 U/L (ref 40–129)
ALT SERPL-CCNC: 20 U/L (ref 5–41)
ANION GAP SERPL CALCULATED.3IONS-SCNC: 13 MMOL/L (ref 9–17)
AST SERPL-CCNC: 25 U/L
BASOPHILS # BLD: 0 % (ref 0–2)
BASOPHILS ABSOLUTE: 0 K/UL (ref 0–0.2)
BILIRUB SERPL-MCNC: 0.42 MG/DL (ref 0.3–1.2)
BNP INTERPRETATION: NORMAL
BUN BLDV-MCNC: 17 MG/DL (ref 8–23)
BUN/CREAT BLD: NORMAL (ref 9–20)
CALCIUM SERPL-MCNC: 9.3 MG/DL (ref 8.6–10.4)
CHLORIDE BLD-SCNC: 103 MMOL/L (ref 98–107)
CO2: 25 MMOL/L (ref 20–31)
CREAT SERPL-MCNC: 1.03 MG/DL (ref 0.7–1.2)
DIFFERENTIAL TYPE: ABNORMAL
EOSINOPHILS RELATIVE PERCENT: 4 % (ref 1–4)
ESTIMATED AVERAGE GLUCOSE: 103 MG/DL
GFR AFRICAN AMERICAN: >60 ML/MIN
GFR NON-AFRICAN AMERICAN: >60 ML/MIN
GFR SERPL CREATININE-BSD FRML MDRD: NORMAL ML/MIN/{1.73_M2}
GFR SERPL CREATININE-BSD FRML MDRD: NORMAL ML/MIN/{1.73_M2}
GLUCOSE BLD-MCNC: 90 MG/DL (ref 70–99)
HBA1C MFR BLD: 5.2 % (ref 4–6)
HCT VFR BLD CALC: 39.3 % (ref 40.7–50.3)
HEMOGLOBIN: 12.4 G/DL (ref 13–17)
IMMATURE GRANULOCYTES: 0 %
LYMPHOCYTES # BLD: 11 % (ref 24–44)
MCH RBC QN AUTO: 31.4 PG (ref 25.2–33.5)
MCHC RBC AUTO-ENTMCNC: 31.6 G/DL (ref 28.4–34.8)
MCV RBC AUTO: 99.5 FL (ref 82.6–102.9)
MONOCYTES # BLD: 15 % (ref 1–7)
MORPHOLOGY: NORMAL
NRBC AUTOMATED: 0 PER 100 WBC
PDW BLD-RTO: 12.7 % (ref 11.8–14.4)
PLATELET # BLD: 203 K/UL (ref 138–453)
PLATELET ESTIMATE: ABNORMAL
PMV BLD AUTO: 9.5 FL (ref 8.1–13.5)
POTASSIUM SERPL-SCNC: 4.1 MMOL/L (ref 3.7–5.3)
PRO-BNP: 90 PG/ML
PROSTATE SPECIFIC ANTIGEN: <0.01 UG/L
RBC # BLD: 3.95 M/UL (ref 4.21–5.77)
RBC # BLD: ABNORMAL 10*6/UL
SEG NEUTROPHILS: 70 % (ref 36–66)
SEGMENTED NEUTROPHILS ABSOLUTE COUNT: 2.44 K/UL (ref 1.8–7.7)
SEX HORMONE BINDING GLOBULIN: 39 NMOL/L (ref 11–80)
SODIUM BLD-SCNC: 141 MMOL/L (ref 135–144)
T3 FREE: 2.82 PG/ML (ref 2.02–4.43)
TESTOSTERONE FREE-NONMALE: 1.9 PG/ML (ref 47–244)
TESTOSTERONE TOTAL: 12 NG/DL (ref 220–1000)
THYROXINE, FREE: 1.12 NG/DL (ref 0.93–1.7)
TOTAL PROTEIN: 7.3 G/DL (ref 6.4–8.3)
TSH SERPL DL<=0.05 MIU/L-ACNC: 3.85 MIU/L (ref 0.3–5)
URIC ACID: 5.4 MG/DL (ref 3.4–7)
WBC # BLD: 3.5 K/UL (ref 3.5–11.3)
WBC # BLD: ABNORMAL 10*3/UL

## 2019-09-05 PROCEDURE — 84481 FREE ASSAY (FT-3): CPT

## 2019-09-05 PROCEDURE — 84550 ASSAY OF BLOOD/URIC ACID: CPT

## 2019-09-05 PROCEDURE — 84153 ASSAY OF PSA TOTAL: CPT

## 2019-09-05 PROCEDURE — 80053 COMPREHEN METABOLIC PANEL: CPT

## 2019-09-05 PROCEDURE — 84439 ASSAY OF FREE THYROXINE: CPT

## 2019-09-05 PROCEDURE — 85025 COMPLETE CBC W/AUTO DIFF WBC: CPT

## 2019-09-05 PROCEDURE — 83036 HEMOGLOBIN GLYCOSYLATED A1C: CPT

## 2019-09-05 PROCEDURE — 84443 ASSAY THYROID STIM HORMONE: CPT

## 2019-09-05 PROCEDURE — 84403 ASSAY OF TOTAL TESTOSTERONE: CPT

## 2019-09-05 PROCEDURE — 36415 COLL VENOUS BLD VENIPUNCTURE: CPT

## 2019-09-05 PROCEDURE — 84270 ASSAY OF SEX HORMONE GLOBUL: CPT

## 2019-09-05 PROCEDURE — 83880 ASSAY OF NATRIURETIC PEPTIDE: CPT

## 2019-09-11 ENCOUNTER — HOSPITAL ENCOUNTER (OUTPATIENT)
Dept: RADIATION ONCOLOGY | Age: 69
Discharge: HOME | End: 2019-09-11
Attending: RADIOLOGY
Payer: COMMERCIAL

## 2019-09-11 VITALS
RESPIRATION RATE: 14 BRPM | OXYGEN SATURATION: 98 % | BODY MASS INDEX: 32.41 KG/M2 | WEIGHT: 210 LBS | DIASTOLIC BLOOD PRESSURE: 85 MMHG | HEART RATE: 72 BPM | SYSTOLIC BLOOD PRESSURE: 134 MMHG

## 2019-09-11 NOTE — PROGRESS NOTES
2019 <0.01 <4.1 ug/L Final     Comment:     The Roche \"ECLIA\" assay is used. Results obtained with different assay methods cannot be   used interchangeably. 01/15/2019 <0.01 <4.1 ug/L Final     Comment:     The Roche \"ECLIA\" assay is used. Results obtained with different assay methods   cannot be used interchangeably. 2018 3.85 <4.1 ug/L Final     Comment:     The Roche \"ECLIA\" assay is used. Results obtained with different assay methods   cannot be used interchangeably. LASTLAB(GLUCOSE,BUN,CREATININE,BUNCRER,CALCIUM,NA,K,CL,CO2,ANIONGAP,ALKPHOS,ALT,AST,BILITOT,PROT,LABALBU,ALBUMIN,LABGLOM,GFRAA,GFR)@    REVIEW OF SYSTEMS:    Review of Systems    PHYSICAL EXAMINATION:  CHAPERONE: Not Required  ECO Asymptomatic  VITAL SIGNS: /85   Pulse 72   Resp 14   Wt 210 lb (95.3 kg)   SpO2 98%   BMI 32.41 kg/m²   Wt Readings from Last 3 Encounters:   19 210 lb (95.3 kg)   19 213 lb (96.6 kg)   19 220 lb (99.8 kg)     Physical Exam  The patient is in no acute distress, oriented in time, person and place. Mucous membranes pink, moist and anicteric. The rest of examination was deferred. 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  He has a diagnosis of an adenocarcinoma of the prostate status post prostatectomy for a Althea 4+3, positive for extraprostatic extension and the surgical margins were negative. The final pathological stage was pT3a N0 M0. The patient then had a Decipher test which returned a high risk score. He was seen by a urologist at the [facility] clinic and adjuvant radiation therapy with consideration of hormonal manipulation was recommended. The patient completed radiation therapy with hormonal manipulation in 2019. He continues on hormonal injections. A recent PSA on 2019 was again undetectable at less than 0.01.       The patient is having significant side effects with the hormones. I did advise the patient to discuss this with his medical oncologist whom he will see in one day. I also noted that the new jejunal papered that the benefit for hormonal manipulation in the post prostatectomy setting in used Casodex for 1 year. I also noted that with the intact prostate and with an immediate risk score that hormones be continued for 6-8 months. Therefore my recommendation would be to consider 6 months of hormones as the patient is variance and some side effects. I scheduled the patient to return in 6 months. His urology office orders the PSA. Electronically signed by Ez Suarez MD on 9/11/2019 at 5:12 PM  [address]      Medications Prescribed:   New Prescriptions    No medications on file       Orders: No orders of the defined types were placed in this encounter.

## 2019-09-11 NOTE — PROGRESS NOTES
Read Capriceer  9/11/2019  3:34 PM      Vitals:    09/11/19 1532   Pulse: 72   Resp: 14   SpO2: 98%    :  Patient Currently in Pain: Denies             Wt Readings from Last 1 Encounters:   09/11/19 210 lb (95.3 kg)                Current Outpatient Medications:     Phenazopyridine HCl (PYRIDIUM PO), Take by mouth as needed, Disp: , Rfl:     tolterodine (DETROL LA) 2 MG extended release capsule, Take 1 capsule by mouth daily (Patient taking differently: Take 2 mg by mouth as needed ), Disp: 30 capsule, Rfl: 5    leuprolide (LUPRON DEPOT, 1-MONTH,) 7.5 MG injection, Inject 7.5 mg into the muscle every 28 days, Disp: 1 kit, Rfl: 11    lisinopril (PRINIVIL;ZESTRIL) 10 MG tablet, Takes 2 pills in the am and 1 and 1/2 pills in the PM, Disp: 90 tablet, Rfl: 3    allopurinol (ZYLOPRIM) 300 MG tablet, Take 3/4 tablet daily, Disp: 90 tablet, Rfl: 3    amLODIPine (NORVASC) 5 MG tablet, Take 1 tablet by mouth daily, Disp: 90 tablet, Rfl: 3    cloNIDine (CATAPRES) 0.1 MG tablet, Take 1 tablet by mouth daily as needed for High Blood Pressure (150/90 or above), Disp: 90 tablet, Rfl: 3    flecainide (TAMBOCOR) 100 MG tablet, Take 1 tablet by mouth 2 times daily, Disp: 180 tablet, Rfl: 3    furosemide (LASIX) 20 MG tablet, Take 1 tablet by mouth daily, Disp: 90 tablet, Rfl: 3    meloxicam (MOBIC) 7.5 MG tablet, Take 1 tablet by mouth 2 times daily, Disp: 180 tablet, Rfl: 3    rosuvastatin (CRESTOR) 10 MG tablet, Take 1 tablet by mouth daily, Disp: 90 tablet, Rfl: 3    zolpidem (AMBIEN) 5 MG tablet, Indications: pt take 1 1/2 a day Patient states that he takes 1.5 pills every Night, Disp: 135 tablet, Rfl: 1    Multiple Vitamin (MULTI-VITAMIN) TABS, Take 1 tablet by mouth, Disp: , Rfl:     nitroGLYCERIN (NITROSTAT) 0.4 MG SL tablet, Place 0.4 mg under the tongue every 5 minutes as needed , Disp: , Rfl:     Immunizations:    Influenza status:    []   Current   []   Patient declined    Pneumococcal status:  []   Current

## 2019-09-12 ENCOUNTER — TELEPHONE (OUTPATIENT)
Dept: ONCOLOGY | Age: 69
End: 2019-09-12

## 2019-09-12 ENCOUNTER — OFFICE VISIT (OUTPATIENT)
Dept: ONCOLOGY | Age: 69
End: 2019-09-12
Payer: COMMERCIAL

## 2019-09-12 VITALS
HEART RATE: 60 BPM | SYSTOLIC BLOOD PRESSURE: 146 MMHG | TEMPERATURE: 98.1 F | DIASTOLIC BLOOD PRESSURE: 87 MMHG | WEIGHT: 210 LBS | BODY MASS INDEX: 32.41 KG/M2

## 2019-09-12 DIAGNOSIS — C61 PROSTATE CANCER (HCC): ICD-10-CM

## 2019-09-12 PROCEDURE — 99211 OFF/OP EST MAY X REQ PHY/QHP: CPT | Performed by: INTERNAL MEDICINE

## 2019-09-12 PROCEDURE — 99214 OFFICE O/P EST MOD 30 MIN: CPT | Performed by: INTERNAL MEDICINE

## 2019-09-12 PROCEDURE — 99212 OFFICE O/P EST SF 10 MIN: CPT | Performed by: RADIOLOGY

## 2019-09-18 ENCOUNTER — OFFICE VISIT (OUTPATIENT)
Dept: UROLOGY | Age: 69
End: 2019-09-18
Payer: COMMERCIAL

## 2019-09-18 VITALS
SYSTOLIC BLOOD PRESSURE: 136 MMHG | WEIGHT: 232 LBS | DIASTOLIC BLOOD PRESSURE: 86 MMHG | BODY MASS INDEX: 36.41 KG/M2 | HEIGHT: 67 IN

## 2019-09-18 DIAGNOSIS — C61 PROSTATE CANCER (HCC): Primary | ICD-10-CM

## 2019-09-18 PROCEDURE — 99213 OFFICE O/P EST LOW 20 MIN: CPT | Performed by: NURSE PRACTITIONER

## 2019-09-18 NOTE — PROGRESS NOTES
No tenderness. Active bowel sounds. Musculoskeletal:    Normal range of motion. She exhibits no edema or tenderness of lower extremities. Extremities:    No cyanosis, clubbing, or edema present. Neurological:    Alert and oriented. No cranial nerve deficit. There are no focalizing motor or sensory deficits.     DATA:  CBC:   Lab Results   Component Value Date    WBC 3.5 09/05/2019    RBC 3.95 09/05/2019    HGB 12.4 09/05/2019    HCT 39.3 09/05/2019    MCV 99.5 09/05/2019    MCH 31.4 09/05/2019    MCHC 31.6 09/05/2019    RDW 12.7 09/05/2019     09/05/2019    MPV 9.5 09/05/2019     BMP:    Lab Results   Component Value Date     09/05/2019    K 4.1 09/05/2019    K 3.5 11/17/2018     09/05/2019    CO2 25 09/05/2019    BUN 17 09/05/2019    CREATININE 1.03 09/05/2019    CALCIUM 9.3 09/05/2019    GFRAA >60 09/05/2019    LABGLOM >60 09/05/2019    LABGLOM 84 11/11/2018    GLUCOSE 90 09/05/2019     BUN/Creatinine:    Lab Results   Component Value Date    BUN 17 09/05/2019    CREATININE 1.03 09/05/2019     Magnesium:    Lab Results   Component Value Date    MG 2.3 11/17/2018     Phosphorus:  No results found for: PHOS  PT/INR:    Lab Results   Component Value Date    PROTIME 10.8 11/14/2018    INR 1.0 11/14/2018     U/A:    Lab Results   Component Value Date    NITRITE neg 06/01/2018    COLORU Yellow 06/12/2019    COLORU Yellow 11/14/2018    PHUR 7.0 11/14/2018    WBCUA 0-2 11/14/2018    RBCUA 5-10 11/14/2018    BACTERIA Rare 11/14/2018    CLARITYU Clear 11/14/2018    SPECGRAV 1.007 11/14/2018    LEUKOCYTESUR TRACE 11/14/2018    UROBILINOGEN 0.20 06/12/2019    BILIRUBINUR Negative 06/12/2019    BILIRUBINUR neg 06/01/2018    BLOODU Moderate 06/12/2019    BLOODU LARGE 11/14/2018    GLUCOSEU Negative 06/12/2019         ADDENDUM REPORT 12/12/18:    FINAL DIAGNOSIS:  A.  Prostate, radical prostatectomy:      Adenocarcinoma of the prostate, Althea score 4+3 = 7, involving      approximately 5% of prostatic

## 2019-09-25 ENCOUNTER — OFFICE VISIT (OUTPATIENT)
Dept: PRIMARY CARE CLINIC | Age: 69
End: 2019-09-25
Payer: COMMERCIAL

## 2019-09-25 VITALS
SYSTOLIC BLOOD PRESSURE: 137 MMHG | OXYGEN SATURATION: 95 % | DIASTOLIC BLOOD PRESSURE: 91 MMHG | WEIGHT: 233 LBS | BODY MASS INDEX: 36.49 KG/M2 | HEART RATE: 62 BPM

## 2019-09-25 DIAGNOSIS — C61 PROSTATE CANCER (HCC): ICD-10-CM

## 2019-09-25 DIAGNOSIS — I10 ESSENTIAL HYPERTENSION: Primary | ICD-10-CM

## 2019-09-25 DIAGNOSIS — Z87.81 HISTORY OF ANKLE FRACTURE: ICD-10-CM

## 2019-09-25 DIAGNOSIS — E66.09 CLASS 2 OBESITY DUE TO EXCESS CALORIES WITH BODY MASS INDEX (BMI) OF 36.0 TO 36.9 IN ADULT, UNSPECIFIED WHETHER SERIOUS COMORBIDITY PRESENT: ICD-10-CM

## 2019-09-25 DIAGNOSIS — F51.01 PRIMARY INSOMNIA: ICD-10-CM

## 2019-09-25 DIAGNOSIS — Z00.00 HEALTHCARE MAINTENANCE: ICD-10-CM

## 2019-09-25 DIAGNOSIS — M25.572 LEFT ANKLE PAIN, UNSPECIFIED CHRONICITY: ICD-10-CM

## 2019-09-25 PROCEDURE — 99214 OFFICE O/P EST MOD 30 MIN: CPT | Performed by: PHYSICIAN ASSISTANT

## 2019-09-25 RX ORDER — AMLODIPINE BESYLATE 10 MG/1
10 TABLET ORAL DAILY
Qty: 30 TABLET | Refills: 0 | Status: SHIPPED | OUTPATIENT
Start: 2019-09-25 | End: 2019-10-25 | Stop reason: SDUPTHER

## 2019-09-25 RX ORDER — LISINOPRIL 10 MG/1
TABLET ORAL
Qty: 270 TABLET | Refills: 3 | Status: SHIPPED | OUTPATIENT
Start: 2019-09-25 | End: 2020-03-13 | Stop reason: SDUPTHER

## 2019-09-25 RX ORDER — ZOLPIDEM TARTRATE 5 MG/1
7.5 TABLET ORAL NIGHTLY PRN
Qty: 135 TABLET | Refills: 1 | Status: SHIPPED | OUTPATIENT
Start: 2019-09-25 | End: 2020-03-04 | Stop reason: SDUPTHER

## 2019-09-25 ASSESSMENT — ENCOUNTER SYMPTOMS
WHEEZING: 1
CONSTIPATION: 1
SHORTNESS OF BREATH: 1
COUGH: 1

## 2019-10-02 ENCOUNTER — HOSPITAL ENCOUNTER (OUTPATIENT)
Dept: INFUSION THERAPY | Age: 69
End: 2019-10-02
Payer: COMMERCIAL

## 2019-10-06 ASSESSMENT — ENCOUNTER SYMPTOMS
BACK PAIN: 0
VOMITING: 0
NAUSEA: 0
DIARRHEA: 0

## 2019-10-09 ENCOUNTER — OFFICE VISIT (OUTPATIENT)
Dept: ORTHOPEDIC SURGERY | Age: 69
End: 2019-10-09
Payer: COMMERCIAL

## 2019-10-09 VITALS
HEART RATE: 70 BPM | SYSTOLIC BLOOD PRESSURE: 144 MMHG | HEIGHT: 68 IN | WEIGHT: 225 LBS | BODY MASS INDEX: 34.1 KG/M2 | DIASTOLIC BLOOD PRESSURE: 84 MMHG

## 2019-10-09 DIAGNOSIS — M19.079 ARTHRITIS OF FOOT: ICD-10-CM

## 2019-10-09 DIAGNOSIS — M19.272 SECONDARY LOCALIZED OSTEOARTHROSIS OF LEFT ANKLE AND FOOT: Primary | ICD-10-CM

## 2019-10-09 DIAGNOSIS — M87.00 AVN (AVASCULAR NECROSIS OF BONE) (HCC): ICD-10-CM

## 2019-10-09 DIAGNOSIS — M19.079 ARTHRITIS OF SUBTALAR JOINT: ICD-10-CM

## 2019-10-09 PROCEDURE — 99204 OFFICE O/P NEW MOD 45 MIN: CPT | Performed by: ORTHOPAEDIC SURGERY

## 2019-10-09 ASSESSMENT — ENCOUNTER SYMPTOMS
SHORTNESS OF BREATH: 0
ABDOMINAL PAIN: 0
EYE PAIN: 0

## 2019-10-21 ENCOUNTER — NURSE ONLY (OUTPATIENT)
Dept: PRIMARY CARE CLINIC | Age: 69
End: 2019-10-21
Payer: COMMERCIAL

## 2019-10-21 DIAGNOSIS — Z23 NEEDS FLU SHOT: Primary | ICD-10-CM

## 2019-10-21 PROCEDURE — 90471 IMMUNIZATION ADMIN: CPT | Performed by: NURSE PRACTITIONER

## 2019-10-21 PROCEDURE — 90653 IIV ADJUVANT VACCINE IM: CPT | Performed by: NURSE PRACTITIONER

## 2019-10-22 DIAGNOSIS — I10 ESSENTIAL HYPERTENSION: ICD-10-CM

## 2019-10-25 RX ORDER — AMLODIPINE BESYLATE 10 MG/1
10 TABLET ORAL DAILY
Qty: 30 TABLET | Refills: 1 | Status: SHIPPED | OUTPATIENT
Start: 2019-10-25 | End: 2019-12-30

## 2019-10-28 ENCOUNTER — TELEPHONE (OUTPATIENT)
Dept: PRIMARY CARE CLINIC | Age: 69
End: 2019-10-28

## 2019-10-28 DIAGNOSIS — K92.1 BLOOD IN STOOL: Primary | ICD-10-CM

## 2019-11-06 ENCOUNTER — ANESTHESIA (OUTPATIENT)
Dept: ENDOSCOPY | Age: 69
End: 2019-11-06
Payer: COMMERCIAL

## 2019-11-06 ENCOUNTER — HOSPITAL ENCOUNTER (OUTPATIENT)
Age: 69
Setting detail: OUTPATIENT SURGERY
Discharge: HOME | End: 2019-11-06
Attending: INTERNAL MEDICINE | Admitting: INTERNAL MEDICINE
Payer: COMMERCIAL

## 2019-11-06 ENCOUNTER — HOSPITAL ENCOUNTER (OUTPATIENT)
Dept: ULTRASOUND IMAGING | Age: 69
Setting detail: OUTPATIENT SURGERY
Discharge: HOME | End: 2019-11-08
Attending: INTERNAL MEDICINE
Payer: COMMERCIAL

## 2019-11-06 ENCOUNTER — ANESTHESIA EVENT (OUTPATIENT)
Dept: ENDOSCOPY | Age: 69
End: 2019-11-06
Payer: COMMERCIAL

## 2019-11-06 VITALS
DIASTOLIC BLOOD PRESSURE: 83 MMHG | OXYGEN SATURATION: 91 % | SYSTOLIC BLOOD PRESSURE: 127 MMHG | RESPIRATION RATE: 15 BRPM

## 2019-11-06 VITALS
DIASTOLIC BLOOD PRESSURE: 63 MMHG | HEART RATE: 50 BPM | OXYGEN SATURATION: 98 % | SYSTOLIC BLOOD PRESSURE: 120 MMHG | WEIGHT: 225 LBS | RESPIRATION RATE: 17 BRPM | TEMPERATURE: 97.8 F | BODY MASS INDEX: 35.31 KG/M2 | HEIGHT: 67 IN

## 2019-11-06 DIAGNOSIS — R10.9 ABDOMINAL PAIN, UNSPECIFIED ABDOMINAL LOCATION: ICD-10-CM

## 2019-11-06 PROCEDURE — 2580000003 HC RX 258: Performed by: INTERNAL MEDICINE

## 2019-11-06 PROCEDURE — 3609012400 HC EGD TRANSORAL BIOPSY SINGLE/MULTIPLE: Performed by: INTERNAL MEDICINE

## 2019-11-06 PROCEDURE — 2500000003 HC RX 250 WO HCPCS: Performed by: NURSE ANESTHETIST, CERTIFIED REGISTERED

## 2019-11-06 PROCEDURE — 2709999900 HC NON-CHARGEABLE SUPPLY: Performed by: INTERNAL MEDICINE

## 2019-11-06 PROCEDURE — 88305 TISSUE EXAM BY PATHOLOGIST: CPT

## 2019-11-06 PROCEDURE — 3609027000 HC COLONOSCOPY: Performed by: INTERNAL MEDICINE

## 2019-11-06 PROCEDURE — 7100000011 HC PHASE II RECOVERY - ADDTL 15 MIN: Performed by: INTERNAL MEDICINE

## 2019-11-06 PROCEDURE — 6370000000 HC RX 637 (ALT 250 FOR IP): Performed by: ANESTHESIOLOGY

## 2019-11-06 PROCEDURE — 7100000010 HC PHASE II RECOVERY - FIRST 15 MIN: Performed by: INTERNAL MEDICINE

## 2019-11-06 PROCEDURE — 3700000001 HC ADD 15 MINUTES (ANESTHESIA): Performed by: INTERNAL MEDICINE

## 2019-11-06 PROCEDURE — 6360000002 HC RX W HCPCS: Performed by: NURSE ANESTHETIST, CERTIFIED REGISTERED

## 2019-11-06 PROCEDURE — 3700000000 HC ANESTHESIA ATTENDED CARE: Performed by: INTERNAL MEDICINE

## 2019-11-06 PROCEDURE — 88342 IMHCHEM/IMCYTCHM 1ST ANTB: CPT

## 2019-11-06 RX ORDER — LIDOCAINE HYDROCHLORIDE 10 MG/ML
INJECTION, SOLUTION EPIDURAL; INFILTRATION; INTRACAUDAL; PERINEURAL PRN
Status: DISCONTINUED | OUTPATIENT
Start: 2019-11-06 | End: 2019-11-06 | Stop reason: SDUPTHER

## 2019-11-06 RX ORDER — PANTOPRAZOLE SODIUM 40 MG/1
40 TABLET, DELAYED RELEASE ORAL
Qty: 60 TABLET | Refills: 0 | Status: ON HOLD | OUTPATIENT
Start: 2019-11-06 | End: 2020-10-08 | Stop reason: ALTCHOICE

## 2019-11-06 RX ORDER — PROPOFOL 10 MG/ML
INJECTION, EMULSION INTRAVENOUS PRN
Status: DISCONTINUED | OUTPATIENT
Start: 2019-11-06 | End: 2019-11-06 | Stop reason: SDUPTHER

## 2019-11-06 RX ORDER — KETOROLAC TROMETHAMINE 5 MG/ML
2 SOLUTION OPHTHALMIC 2 TIMES DAILY
Status: DISCONTINUED | OUTPATIENT
Start: 2019-11-06 | End: 2019-11-06 | Stop reason: HOSPADM

## 2019-11-06 RX ORDER — SODIUM CHLORIDE 9 MG/ML
INJECTION, SOLUTION INTRAVENOUS CONTINUOUS
Status: DISCONTINUED | OUTPATIENT
Start: 2019-11-06 | End: 2019-11-06 | Stop reason: HOSPADM

## 2019-11-06 RX ORDER — LIDOCAINE HYDROCHLORIDE 10 MG/ML
1 INJECTION, SOLUTION EPIDURAL; INFILTRATION; INTRACAUDAL; PERINEURAL
Status: CANCELLED | OUTPATIENT
Start: 2019-11-06 | End: 2019-11-06

## 2019-11-06 RX ORDER — SODIUM CHLORIDE, SODIUM LACTATE, POTASSIUM CHLORIDE, CALCIUM CHLORIDE 600; 310; 30; 20 MG/100ML; MG/100ML; MG/100ML; MG/100ML
INJECTION, SOLUTION INTRAVENOUS CONTINUOUS
Status: CANCELLED | OUTPATIENT
Start: 2019-11-06

## 2019-11-06 RX ORDER — TOLTERODINE TARTRATE 2 MG/1
2 TABLET, EXTENDED RELEASE ORAL PRN
COMMUNITY
End: 2020-09-23

## 2019-11-06 RX ADMIN — PROPOFOL 10 MG: 10 INJECTION, EMULSION INTRAVENOUS at 11:31

## 2019-11-06 RX ADMIN — SODIUM CHLORIDE: 9 INJECTION, SOLUTION INTRAVENOUS at 10:46

## 2019-11-06 RX ADMIN — PROPOFOL 20 MG: 10 INJECTION, EMULSION INTRAVENOUS at 11:20

## 2019-11-06 RX ADMIN — PROPOFOL 20 MG: 10 INJECTION, EMULSION INTRAVENOUS at 11:13

## 2019-11-06 RX ADMIN — PROPOFOL 10 MG: 10 INJECTION, EMULSION INTRAVENOUS at 11:35

## 2019-11-06 RX ADMIN — PROPOFOL 20 MG: 10 INJECTION, EMULSION INTRAVENOUS at 11:04

## 2019-11-06 RX ADMIN — PROPOFOL 20 MG: 10 INJECTION, EMULSION INTRAVENOUS at 11:11

## 2019-11-06 RX ADMIN — PROPOFOL 20 MG: 10 INJECTION, EMULSION INTRAVENOUS at 11:15

## 2019-11-06 RX ADMIN — PROPOFOL 10 MG: 10 INJECTION, EMULSION INTRAVENOUS at 11:30

## 2019-11-06 RX ADMIN — KETOROLAC TROMETHAMINE 2 DROP: 5 SOLUTION OPHTHALMIC at 13:33

## 2019-11-06 RX ADMIN — PROPOFOL 20 MG: 10 INJECTION, EMULSION INTRAVENOUS at 11:16

## 2019-11-06 RX ADMIN — PROPOFOL 20 MG: 10 INJECTION, EMULSION INTRAVENOUS at 11:18

## 2019-11-06 RX ADMIN — PROPOFOL 20 MG: 10 INJECTION, EMULSION INTRAVENOUS at 11:29

## 2019-11-06 RX ADMIN — PROPOFOL 20 MG: 10 INJECTION, EMULSION INTRAVENOUS at 11:17

## 2019-11-06 RX ADMIN — PROPOFOL 20 MG: 10 INJECTION, EMULSION INTRAVENOUS at 11:36

## 2019-11-06 RX ADMIN — PROPOFOL 20 MG: 10 INJECTION, EMULSION INTRAVENOUS at 11:08

## 2019-11-06 RX ADMIN — PROPOFOL 20 MG: 10 INJECTION, EMULSION INTRAVENOUS at 11:07

## 2019-11-06 RX ADMIN — PROPOFOL 10 MG: 10 INJECTION, EMULSION INTRAVENOUS at 11:25

## 2019-11-06 RX ADMIN — PROPOFOL 20 MG: 10 INJECTION, EMULSION INTRAVENOUS at 11:26

## 2019-11-06 RX ADMIN — PROPOFOL 20 MG: 10 INJECTION, EMULSION INTRAVENOUS at 11:27

## 2019-11-06 RX ADMIN — PROPOFOL 10 MG: 10 INJECTION, EMULSION INTRAVENOUS at 11:28

## 2019-11-06 RX ADMIN — LIDOCAINE HYDROCHLORIDE 100 MG: 10 INJECTION, SOLUTION EPIDURAL; INFILTRATION; INTRACAUDAL at 11:00

## 2019-11-06 RX ADMIN — PROPOFOL 50 MG: 10 INJECTION, EMULSION INTRAVENOUS at 11:00

## 2019-11-06 RX ADMIN — PROPOFOL 30 MG: 10 INJECTION, EMULSION INTRAVENOUS at 11:02

## 2019-11-06 RX ADMIN — PROPOFOL 20 MG: 10 INJECTION, EMULSION INTRAVENOUS at 11:22

## 2019-11-06 RX ADMIN — PROPOFOL 20 MG: 10 INJECTION, EMULSION INTRAVENOUS at 11:21

## 2019-11-06 RX ADMIN — PROPOFOL 20 MG: 10 INJECTION, EMULSION INTRAVENOUS at 11:01

## 2019-11-06 RX ADMIN — PROPOFOL 20 MG: 10 INJECTION, EMULSION INTRAVENOUS at 11:05

## 2019-11-06 RX ADMIN — PROPOFOL 20 MG: 10 INJECTION, EMULSION INTRAVENOUS at 11:03

## 2019-11-06 RX ADMIN — PROPOFOL 20 MG: 10 INJECTION, EMULSION INTRAVENOUS at 11:19

## 2019-11-06 RX ADMIN — PROPOFOL 10 MG: 10 INJECTION, EMULSION INTRAVENOUS at 11:34

## 2019-11-06 RX ADMIN — PROPOFOL 10 MG: 10 INJECTION, EMULSION INTRAVENOUS at 11:24

## 2019-11-06 RX ADMIN — PROPOFOL 10 MG: 10 INJECTION, EMULSION INTRAVENOUS at 11:33

## 2019-11-06 RX ADMIN — PROPOFOL 10 MG: 10 INJECTION, EMULSION INTRAVENOUS at 11:23

## 2019-11-06 RX ADMIN — PROPOFOL 20 MG: 10 INJECTION, EMULSION INTRAVENOUS at 11:14

## 2019-11-06 RX ADMIN — PROPOFOL 20 MG: 10 INJECTION, EMULSION INTRAVENOUS at 11:12

## 2019-11-06 RX ADMIN — PROPOFOL 10 MG: 10 INJECTION, EMULSION INTRAVENOUS at 11:32

## 2019-11-06 RX ADMIN — SODIUM CHLORIDE: 9 INJECTION, SOLUTION INTRAVENOUS at 11:27

## 2019-11-06 ASSESSMENT — PAIN - FUNCTIONAL ASSESSMENT: PAIN_FUNCTIONAL_ASSESSMENT: 0-10

## 2019-11-06 ASSESSMENT — PAIN SCALES - GENERAL
PAINLEVEL_OUTOF10: 0
PAINLEVEL_OUTOF10: 0
PAINLEVEL_OUTOF10: 5

## 2019-11-08 LAB — SURGICAL PATHOLOGY REPORT: NORMAL

## 2019-12-13 ENCOUNTER — TELEPHONE (OUTPATIENT)
Dept: PRIMARY CARE CLINIC | Age: 69
End: 2019-12-13

## 2019-12-13 DIAGNOSIS — D64.9 ANEMIA, UNSPECIFIED TYPE: Primary | ICD-10-CM

## 2019-12-18 ENCOUNTER — HOSPITAL ENCOUNTER (OUTPATIENT)
Age: 69
Discharge: HOME | End: 2019-12-18
Payer: COMMERCIAL

## 2019-12-18 DIAGNOSIS — C61 PROSTATE CANCER (HCC): ICD-10-CM

## 2019-12-18 DIAGNOSIS — D64.9 ANEMIA, UNSPECIFIED TYPE: ICD-10-CM

## 2019-12-18 DIAGNOSIS — I10 ESSENTIAL HYPERTENSION: ICD-10-CM

## 2019-12-18 DIAGNOSIS — Z00.00 HEALTHCARE MAINTENANCE: ICD-10-CM

## 2019-12-18 LAB
ALBUMIN SERPL-MCNC: 4.3 G/DL (ref 3.5–5.2)
ALBUMIN/GLOBULIN RATIO: 1.4 (ref 1–2.5)
ALP BLD-CCNC: 78 U/L (ref 40–129)
ALT SERPL-CCNC: 18 U/L (ref 5–41)
ANION GAP SERPL CALCULATED.3IONS-SCNC: 13 MMOL/L (ref 9–17)
AST SERPL-CCNC: 22 U/L
BILIRUB SERPL-MCNC: 0.38 MG/DL (ref 0.3–1.2)
BUN BLDV-MCNC: 21 MG/DL (ref 8–23)
BUN/CREAT BLD: ABNORMAL (ref 9–20)
CALCIUM SERPL-MCNC: 9.6 MG/DL (ref 8.6–10.4)
CHLORIDE BLD-SCNC: 105 MMOL/L (ref 98–107)
CHOLESTEROL/HDL RATIO: 3.7
CHOLESTEROL: 179 MG/DL
CO2: 24 MMOL/L (ref 20–31)
CREAT SERPL-MCNC: 1.12 MG/DL (ref 0.7–1.2)
GFR AFRICAN AMERICAN: >60 ML/MIN
GFR NON-AFRICAN AMERICAN: >60 ML/MIN
GFR SERPL CREATININE-BSD FRML MDRD: ABNORMAL ML/MIN/{1.73_M2}
GFR SERPL CREATININE-BSD FRML MDRD: ABNORMAL ML/MIN/{1.73_M2}
GLUCOSE BLD-MCNC: 100 MG/DL (ref 70–99)
HCT VFR BLD CALC: 36.9 % (ref 40.7–50.3)
HDLC SERPL-MCNC: 49 MG/DL
HEMOGLOBIN: 12.2 G/DL (ref 13–17)
IRON SATURATION: 26 % (ref 20–55)
IRON: 82 UG/DL (ref 59–158)
LDL CHOLESTEROL: 87 MG/DL (ref 0–130)
MCH RBC QN AUTO: 32.3 PG (ref 25.2–33.5)
MCHC RBC AUTO-ENTMCNC: 33.1 G/DL (ref 28.4–34.8)
MCV RBC AUTO: 97.6 FL (ref 82.6–102.9)
NRBC AUTOMATED: 0 PER 100 WBC
PDW BLD-RTO: 13.4 % (ref 11.8–14.4)
PLATELET # BLD: 235 K/UL (ref 138–453)
PMV BLD AUTO: 10.2 FL (ref 8.1–13.5)
POTASSIUM SERPL-SCNC: 4.6 MMOL/L (ref 3.7–5.3)
PROSTATE SPECIFIC ANTIGEN: <0.01 UG/L
RBC # BLD: 3.78 M/UL (ref 4.21–5.77)
SEX HORMONE BINDING GLOBULIN: 30 NMOL/L (ref 11–80)
SODIUM BLD-SCNC: 142 MMOL/L (ref 135–144)
TESTOSTERONE FREE-NONMALE: 37.8 PG/ML (ref 47–244)
TESTOSTERONE TOTAL: 189 NG/DL (ref 220–1000)
TOTAL IRON BINDING CAPACITY: 311 UG/DL (ref 250–450)
TOTAL PROTEIN: 7.3 G/DL (ref 6.4–8.3)
TRIGL SERPL-MCNC: 216 MG/DL
UNSATURATED IRON BINDING CAPACITY: 229 UG/DL (ref 112–347)
URIC ACID: 6 MG/DL (ref 3.4–7)
VLDLC SERPL CALC-MCNC: ABNORMAL MG/DL (ref 1–30)
WBC # BLD: 3.3 K/UL (ref 3.5–11.3)

## 2019-12-18 PROCEDURE — 83550 IRON BINDING TEST: CPT

## 2019-12-18 PROCEDURE — 36415 COLL VENOUS BLD VENIPUNCTURE: CPT

## 2019-12-18 PROCEDURE — 80053 COMPREHEN METABOLIC PANEL: CPT

## 2019-12-18 PROCEDURE — 83540 ASSAY OF IRON: CPT

## 2019-12-18 PROCEDURE — 84153 ASSAY OF PSA TOTAL: CPT

## 2019-12-18 PROCEDURE — 80061 LIPID PANEL: CPT

## 2019-12-18 PROCEDURE — 84270 ASSAY OF SEX HORMONE GLOBUL: CPT

## 2019-12-18 PROCEDURE — 85027 COMPLETE CBC AUTOMATED: CPT

## 2019-12-18 PROCEDURE — 84403 ASSAY OF TOTAL TESTOSTERONE: CPT

## 2019-12-18 PROCEDURE — 84550 ASSAY OF BLOOD/URIC ACID: CPT

## 2019-12-20 ENCOUNTER — TELEPHONE (OUTPATIENT)
Dept: PRIMARY CARE CLINIC | Age: 69
End: 2019-12-20

## 2019-12-20 DIAGNOSIS — E78.1 HYPERTRIGLYCERIDEMIA: Primary | ICD-10-CM

## 2019-12-23 ENCOUNTER — TELEPHONE (OUTPATIENT)
Dept: UROLOGY | Age: 69
End: 2019-12-23

## 2020-02-25 ENCOUNTER — APPOINTMENT (OUTPATIENT)
Dept: CT IMAGING | Age: 70
DRG: 274 | End: 2020-02-25
Payer: COMMERCIAL

## 2020-02-25 ENCOUNTER — HOSPITAL ENCOUNTER (INPATIENT)
Age: 70
LOS: 2 days | Discharge: HOME | DRG: 274 | End: 2020-02-27
Attending: EMERGENCY MEDICINE | Admitting: INTERNAL MEDICINE
Payer: COMMERCIAL

## 2020-02-25 PROBLEM — I48.92 ATRIAL FLUTTER (HCC): Status: ACTIVE | Noted: 2020-02-25

## 2020-02-25 LAB
-: ABNORMAL
ABSOLUTE EOS #: 0.2 K/UL (ref 0–0.4)
ABSOLUTE IMMATURE GRANULOCYTE: ABNORMAL K/UL (ref 0–0.3)
ABSOLUTE LYMPH #: 0.6 K/UL (ref 1–4.8)
ABSOLUTE MONO #: 0.7 K/UL (ref 0.1–1.2)
AMORPHOUS: ABNORMAL
ANION GAP SERPL CALCULATED.3IONS-SCNC: 15 MMOL/L (ref 9–17)
BACTERIA: ABNORMAL
BASOPHILS # BLD: 1 % (ref 0–2)
BASOPHILS ABSOLUTE: 0 K/UL (ref 0–0.2)
BILIRUBIN URINE: NEGATIVE
BNP INTERPRETATION: NORMAL
BUN BLDV-MCNC: 19 MG/DL (ref 8–23)
BUN/CREAT BLD: ABNORMAL (ref 9–20)
CALCIUM SERPL-MCNC: 9.6 MG/DL (ref 8.6–10.4)
CASTS UA: ABNORMAL /LPF
CHLORIDE BLD-SCNC: 100 MMOL/L (ref 98–107)
CO2: 22 MMOL/L (ref 20–31)
COLOR: YELLOW
COMMENT UA: ABNORMAL
CREAT SERPL-MCNC: 1.03 MG/DL (ref 0.7–1.2)
CRYSTALS, UA: ABNORMAL /HPF
D-DIMER QUANTITATIVE: 0.82 MG/L FEU
DIFFERENTIAL TYPE: ABNORMAL
EOSINOPHILS RELATIVE PERCENT: 4 % (ref 1–4)
EPITHELIAL CELLS UA: ABNORMAL /HPF (ref 0–5)
GFR AFRICAN AMERICAN: >60 ML/MIN
GFR NON-AFRICAN AMERICAN: >60 ML/MIN
GFR SERPL CREATININE-BSD FRML MDRD: ABNORMAL ML/MIN/{1.73_M2}
GFR SERPL CREATININE-BSD FRML MDRD: ABNORMAL ML/MIN/{1.73_M2}
GLUCOSE BLD-MCNC: 122 MG/DL (ref 70–99)
GLUCOSE URINE: NEGATIVE
HCT VFR BLD CALC: 44.5 % (ref 41–53)
HEMOGLOBIN: 14.8 G/DL (ref 13.5–17.5)
IMMATURE GRANULOCYTES: ABNORMAL %
INR BLD: 0.9
KETONES, URINE: ABNORMAL
LACTIC ACID: 1.2 MMOL/L (ref 0.5–2.2)
LEUKOCYTE ESTERASE, URINE: ABNORMAL
LYMPHOCYTES # BLD: 14 % (ref 24–44)
MCH RBC QN AUTO: 31.6 PG (ref 26–34)
MCHC RBC AUTO-ENTMCNC: 33.3 G/DL (ref 31–37)
MCV RBC AUTO: 94.8 FL (ref 80–100)
MONOCYTES # BLD: 15 % (ref 2–11)
MUCUS: ABNORMAL
NITRITE, URINE: NEGATIVE
NRBC AUTOMATED: ABNORMAL PER 100 WBC
OTHER OBSERVATIONS UA: ABNORMAL
PARTIAL THROMBOPLASTIN TIME: 29.4 SEC (ref 21.3–31.3)
PDW BLD-RTO: 14 % (ref 12.5–15.4)
PH UA: 6 (ref 5–8)
PLATELET # BLD: 262 K/UL (ref 140–450)
PLATELET ESTIMATE: ABNORMAL
PMV BLD AUTO: 8.5 FL (ref 6–12)
POTASSIUM SERPL-SCNC: 3.5 MMOL/L (ref 3.7–5.3)
PRO-BNP: 55 PG/ML
PROTEIN UA: NEGATIVE
PROTHROMBIN TIME: 9.9 SEC (ref 9.4–12.6)
RBC # BLD: 4.7 M/UL (ref 4.5–5.9)
RBC # BLD: ABNORMAL 10*6/UL
RBC UA: ABNORMAL /HPF (ref 0–2)
RENAL EPITHELIAL, UA: ABNORMAL /HPF
SEG NEUTROPHILS: 66 % (ref 36–66)
SEGMENTED NEUTROPHILS ABSOLUTE COUNT: 2.9 K/UL (ref 1.8–7.7)
SODIUM BLD-SCNC: 137 MMOL/L (ref 135–144)
SPECIFIC GRAVITY UA: 1.01 (ref 1–1.03)
TRICHOMONAS: ABNORMAL
TROPONIN INTERP: ABNORMAL
TROPONIN INTERP: NORMAL
TROPONIN T: ABNORMAL NG/ML
TROPONIN T: NORMAL NG/ML
TROPONIN, HIGH SENSITIVITY: 21 NG/L (ref 0–22)
TROPONIN, HIGH SENSITIVITY: 23 NG/L (ref 0–22)
TURBIDITY: CLEAR
URINE HGB: NEGATIVE
UROBILINOGEN, URINE: NORMAL
WBC # BLD: 4.4 K/UL (ref 3.5–11)
WBC # BLD: ABNORMAL 10*3/UL
WBC UA: ABNORMAL /HPF (ref 0–5)
YEAST: ABNORMAL

## 2020-02-25 PROCEDURE — 83605 ASSAY OF LACTIC ACID: CPT

## 2020-02-25 PROCEDURE — 84439 ASSAY OF FREE THYROXINE: CPT

## 2020-02-25 PROCEDURE — 85610 PROTHROMBIN TIME: CPT

## 2020-02-25 PROCEDURE — 93005 ELECTROCARDIOGRAM TRACING: CPT | Performed by: EMERGENCY MEDICINE

## 2020-02-25 PROCEDURE — 84443 ASSAY THYROID STIM HORMONE: CPT

## 2020-02-25 PROCEDURE — 71260 CT THORAX DX C+: CPT

## 2020-02-25 PROCEDURE — 99285 EMERGENCY DEPT VISIT HI MDM: CPT

## 2020-02-25 PROCEDURE — 36415 COLL VENOUS BLD VENIPUNCTURE: CPT

## 2020-02-25 PROCEDURE — 85730 THROMBOPLASTIN TIME PARTIAL: CPT

## 2020-02-25 PROCEDURE — 96372 THER/PROPH/DIAG INJ SC/IM: CPT

## 2020-02-25 PROCEDURE — 6370000000 HC RX 637 (ALT 250 FOR IP): Performed by: EMERGENCY MEDICINE

## 2020-02-25 PROCEDURE — 6360000004 HC RX CONTRAST MEDICATION: Performed by: EMERGENCY MEDICINE

## 2020-02-25 PROCEDURE — 2580000003 HC RX 258: Performed by: EMERGENCY MEDICINE

## 2020-02-25 PROCEDURE — 87077 CULTURE AEROBIC IDENTIFY: CPT

## 2020-02-25 PROCEDURE — 6360000002 HC RX W HCPCS: Performed by: EMERGENCY MEDICINE

## 2020-02-25 PROCEDURE — 1200000000 HC SEMI PRIVATE

## 2020-02-25 PROCEDURE — 87186 SC STD MICRODIL/AGAR DIL: CPT

## 2020-02-25 PROCEDURE — 81001 URINALYSIS AUTO W/SCOPE: CPT

## 2020-02-25 PROCEDURE — 85379 FIBRIN DEGRADATION QUANT: CPT

## 2020-02-25 PROCEDURE — 83880 ASSAY OF NATRIURETIC PEPTIDE: CPT

## 2020-02-25 PROCEDURE — 85025 COMPLETE CBC W/AUTO DIFF WBC: CPT

## 2020-02-25 PROCEDURE — 87086 URINE CULTURE/COLONY COUNT: CPT

## 2020-02-25 PROCEDURE — 84484 ASSAY OF TROPONIN QUANT: CPT

## 2020-02-25 PROCEDURE — 80048 BASIC METABOLIC PNL TOTAL CA: CPT

## 2020-02-25 RX ORDER — 0.9 % SODIUM CHLORIDE 0.9 %
1000 INTRAVENOUS SOLUTION INTRAVENOUS ONCE
Status: COMPLETED | OUTPATIENT
Start: 2020-02-25 | End: 2020-02-25

## 2020-02-25 RX ORDER — SODIUM CHLORIDE 0.9 % (FLUSH) 0.9 %
10 SYRINGE (ML) INJECTION PRN
Status: DISCONTINUED | OUTPATIENT
Start: 2020-02-25 | End: 2020-02-27 | Stop reason: HOSPADM

## 2020-02-25 RX ORDER — ASPIRIN 81 MG/1
324 TABLET, CHEWABLE ORAL ONCE
Status: COMPLETED | OUTPATIENT
Start: 2020-02-25 | End: 2020-02-25

## 2020-02-25 RX ORDER — 0.9 % SODIUM CHLORIDE 0.9 %
80 INTRAVENOUS SOLUTION INTRAVENOUS ONCE
Status: COMPLETED | OUTPATIENT
Start: 2020-02-25 | End: 2020-02-25

## 2020-02-25 RX ADMIN — ENOXAPARIN SODIUM 100 MG: 100 INJECTION SUBCUTANEOUS at 23:12

## 2020-02-25 RX ADMIN — ASPIRIN 81 MG 324 MG: 81 TABLET ORAL at 21:13

## 2020-02-25 RX ADMIN — SODIUM CHLORIDE 1000 ML: 9 INJECTION, SOLUTION INTRAVENOUS at 22:19

## 2020-02-25 RX ADMIN — SODIUM CHLORIDE 1000 ML: 9 INJECTION, SOLUTION INTRAVENOUS at 21:10

## 2020-02-25 RX ADMIN — SODIUM CHLORIDE 80 ML: 9 INJECTION, SOLUTION INTRAVENOUS at 21:42

## 2020-02-25 RX ADMIN — Medication 10 ML: at 21:42

## 2020-02-25 RX ADMIN — IOVERSOL 75 ML: 741 INJECTION INTRA-ARTERIAL; INTRAVENOUS at 21:41

## 2020-02-25 ASSESSMENT — PAIN DESCRIPTION - LOCATION: LOCATION: CHEST

## 2020-02-25 ASSESSMENT — PAIN DESCRIPTION - DESCRIPTORS: DESCRIPTORS: DISCOMFORT

## 2020-02-25 ASSESSMENT — PAIN SCALES - GENERAL: PAINLEVEL_OUTOF10: 2

## 2020-02-25 ASSESSMENT — PAIN DESCRIPTION - PAIN TYPE: TYPE: ACUTE PAIN

## 2020-02-26 ENCOUNTER — APPOINTMENT (OUTPATIENT)
Dept: CARDIAC CATH/INVASIVE PROCEDURES | Age: 70
DRG: 274 | End: 2020-02-26
Payer: COMMERCIAL

## 2020-02-26 ENCOUNTER — ANESTHESIA (OUTPATIENT)
Dept: CARDIAC CATH/INVASIVE PROCEDURES | Age: 70
DRG: 274 | End: 2020-02-26
Payer: COMMERCIAL

## 2020-02-26 ENCOUNTER — ANESTHESIA EVENT (OUTPATIENT)
Dept: CARDIAC CATH/INVASIVE PROCEDURES | Age: 70
DRG: 274 | End: 2020-02-26
Payer: COMMERCIAL

## 2020-02-26 VITALS — OXYGEN SATURATION: 97 % | TEMPERATURE: 97.3 F | SYSTOLIC BLOOD PRESSURE: 99 MMHG | DIASTOLIC BLOOD PRESSURE: 67 MMHG

## 2020-02-26 PROBLEM — I48.92 ATRIAL FLUTTER WITH RAPID VENTRICULAR RESPONSE (HCC): Status: ACTIVE | Noted: 2020-02-26

## 2020-02-26 PROBLEM — E87.6 HYPOKALEMIA: Status: ACTIVE | Noted: 2020-02-26

## 2020-02-26 LAB
ALBUMIN SERPL-MCNC: 3.8 G/DL (ref 3.5–5.2)
ALBUMIN/GLOBULIN RATIO: 1.5 (ref 1–2.5)
ALP BLD-CCNC: 72 U/L (ref 40–129)
ALT SERPL-CCNC: 19 U/L (ref 5–41)
ANION GAP SERPL CALCULATED.3IONS-SCNC: 16 MMOL/L (ref 9–17)
AST SERPL-CCNC: 24 U/L
BILIRUB SERPL-MCNC: 0.29 MG/DL (ref 0.3–1.2)
BNP INTERPRETATION: ABNORMAL
BUN BLDV-MCNC: 17 MG/DL (ref 8–23)
BUN/CREAT BLD: ABNORMAL (ref 9–20)
CALCIUM SERPL-MCNC: 9.1 MG/DL (ref 8.6–10.4)
CHLORIDE BLD-SCNC: 107 MMOL/L (ref 98–107)
CO2: 18 MMOL/L (ref 20–31)
CREAT SERPL-MCNC: 0.89 MG/DL (ref 0.7–1.2)
EKG ATRIAL RATE: 118 BPM
EKG ATRIAL RATE: 121 BPM
EKG P-R INTERVAL: 142 MS
EKG P-R INTERVAL: 160 MS
EKG Q-T INTERVAL: 386 MS
EKG Q-T INTERVAL: 388 MS
EKG QRS DURATION: 132 MS
EKG QRS DURATION: 140 MS
EKG QTC CALCULATION (BAZETT): 543 MS
EKG QTC CALCULATION (BAZETT): 548 MS
EKG R AXIS: -36 DEGREES
EKG R AXIS: -55 DEGREES
EKG T AXIS: 142 DEGREES
EKG T AXIS: 148 DEGREES
EKG VENTRICULAR RATE: 118 BPM
EKG VENTRICULAR RATE: 121 BPM
GFR AFRICAN AMERICAN: >60 ML/MIN
GFR NON-AFRICAN AMERICAN: >60 ML/MIN
GFR SERPL CREATININE-BSD FRML MDRD: ABNORMAL ML/MIN/{1.73_M2}
GFR SERPL CREATININE-BSD FRML MDRD: ABNORMAL ML/MIN/{1.73_M2}
GLUCOSE BLD-MCNC: 92 MG/DL (ref 70–99)
INR BLD: 0.9
MAGNESIUM: 2.1 MG/DL (ref 1.6–2.6)
MAGNESIUM: 2.1 MG/DL (ref 1.6–2.6)
PHOSPHORUS: 3.2 MG/DL (ref 2.5–4.5)
POTASSIUM SERPL-SCNC: 4 MMOL/L (ref 3.7–5.3)
PRO-BNP: 378 PG/ML
PROTHROMBIN TIME: 9.9 SEC (ref 9–12)
SODIUM BLD-SCNC: 141 MMOL/L (ref 135–144)
THYROXINE, FREE: 1.47 NG/DL (ref 0.93–1.7)
TOTAL PROTEIN: 6.4 G/DL (ref 6.4–8.3)
TROPONIN INTERP: ABNORMAL
TROPONIN INTERP: ABNORMAL
TROPONIN INTERP: NORMAL
TROPONIN T: ABNORMAL NG/ML
TROPONIN T: ABNORMAL NG/ML
TROPONIN T: NORMAL NG/ML
TROPONIN, HIGH SENSITIVITY: 21 NG/L (ref 0–22)
TROPONIN, HIGH SENSITIVITY: 24 NG/L (ref 0–22)
TROPONIN, HIGH SENSITIVITY: 27 NG/L (ref 0–22)
TSH SERPL DL<=0.05 MIU/L-ACNC: 4.71 MIU/L (ref 0.3–5)
TSH SERPL DL<=0.05 MIU/L-ACNC: 6 MIU/L (ref 0.3–5)

## 2020-02-26 PROCEDURE — 2500000003 HC RX 250 WO HCPCS: Performed by: NURSE ANESTHETIST, CERTIFIED REGISTERED

## 2020-02-26 PROCEDURE — 3700000000 HC ANESTHESIA ATTENDED CARE

## 2020-02-26 PROCEDURE — 7100000010 HC PHASE II RECOVERY - FIRST 15 MIN

## 2020-02-26 PROCEDURE — C1730 CATH, EP, 19 OR FEW ELECT: HCPCS

## 2020-02-26 PROCEDURE — 2500000003 HC RX 250 WO HCPCS

## 2020-02-26 PROCEDURE — 6360000002 HC RX W HCPCS

## 2020-02-26 PROCEDURE — 93613 INTRACARDIAC EPHYS 3D MAPG: CPT | Performed by: INTERNAL MEDICINE

## 2020-02-26 PROCEDURE — 83880 ASSAY OF NATRIURETIC PEPTIDE: CPT

## 2020-02-26 PROCEDURE — C1894 INTRO/SHEATH, NON-LASER: HCPCS

## 2020-02-26 PROCEDURE — 2060000000 HC ICU INTERMEDIATE R&B

## 2020-02-26 PROCEDURE — 7100000001 HC PACU RECOVERY - ADDTL 15 MIN

## 2020-02-26 PROCEDURE — 36415 COLL VENOUS BLD VENIPUNCTURE: CPT

## 2020-02-26 PROCEDURE — 99223 1ST HOSP IP/OBS HIGH 75: CPT | Performed by: INTERNAL MEDICINE

## 2020-02-26 PROCEDURE — 6360000002 HC RX W HCPCS: Performed by: NURSE ANESTHETIST, CERTIFIED REGISTERED

## 2020-02-26 PROCEDURE — 84484 ASSAY OF TROPONIN QUANT: CPT

## 2020-02-26 PROCEDURE — 4A0234Z MEASUREMENT OF CARDIAC ELECTRICAL ACTIVITY, PERCUTANEOUS APPROACH: ICD-10-PCS | Performed by: INTERNAL MEDICINE

## 2020-02-26 PROCEDURE — 83735 ASSAY OF MAGNESIUM: CPT

## 2020-02-26 PROCEDURE — 3700000001 HC ADD 15 MINUTES (ANESTHESIA)

## 2020-02-26 PROCEDURE — 6370000000 HC RX 637 (ALT 250 FOR IP): Performed by: NURSE PRACTITIONER

## 2020-02-26 PROCEDURE — 7100000000 HC PACU RECOVERY - FIRST 15 MIN

## 2020-02-26 PROCEDURE — 93005 ELECTROCARDIOGRAM TRACING: CPT | Performed by: INTERNAL MEDICINE

## 2020-02-26 PROCEDURE — 6370000000 HC RX 637 (ALT 250 FOR IP): Performed by: INTERNAL MEDICINE

## 2020-02-26 PROCEDURE — 84100 ASSAY OF PHOSPHORUS: CPT

## 2020-02-26 PROCEDURE — 84443 ASSAY THYROID STIM HORMONE: CPT

## 2020-02-26 PROCEDURE — 02583ZZ DESTRUCTION OF CONDUCTION MECHANISM, PERCUTANEOUS APPROACH: ICD-10-PCS | Performed by: INTERNAL MEDICINE

## 2020-02-26 PROCEDURE — 93653 COMPRE EP EVAL TX SVT: CPT | Performed by: INTERNAL MEDICINE

## 2020-02-26 PROCEDURE — C1732 CATH, EP, DIAG/ABL, 3D/VECT: HCPCS

## 2020-02-26 PROCEDURE — 80053 COMPREHEN METABOLIC PANEL: CPT

## 2020-02-26 PROCEDURE — 7100000011 HC PHASE II RECOVERY - ADDTL 15 MIN

## 2020-02-26 PROCEDURE — 2580000003 HC RX 258: Performed by: NURSE ANESTHETIST, CERTIFIED REGISTERED

## 2020-02-26 PROCEDURE — 4A023FZ MEASUREMENT OF CARDIAC RHYTHM, PERCUTANEOUS APPROACH: ICD-10-PCS | Performed by: INTERNAL MEDICINE

## 2020-02-26 PROCEDURE — 2709999900 HC NON-CHARGEABLE SUPPLY

## 2020-02-26 PROCEDURE — 85610 PROTHROMBIN TIME: CPT

## 2020-02-26 RX ORDER — FUROSEMIDE 20 MG/1
20 TABLET ORAL DAILY
Status: DISCONTINUED | OUTPATIENT
Start: 2020-02-26 | End: 2020-02-27 | Stop reason: HOSPADM

## 2020-02-26 RX ORDER — PANTOPRAZOLE SODIUM 40 MG/1
40 TABLET, DELAYED RELEASE ORAL
Status: DISCONTINUED | OUTPATIENT
Start: 2020-02-26 | End: 2020-02-27 | Stop reason: HOSPADM

## 2020-02-26 RX ORDER — ALLOPURINOL 300 MG/1
300 TABLET ORAL DAILY
Status: DISCONTINUED | OUTPATIENT
Start: 2020-02-26 | End: 2020-02-27 | Stop reason: HOSPADM

## 2020-02-26 RX ORDER — LIDOCAINE HYDROCHLORIDE 10 MG/ML
INJECTION, SOLUTION EPIDURAL; INFILTRATION; INTRACAUDAL; PERINEURAL PRN
Status: DISCONTINUED | OUTPATIENT
Start: 2020-02-26 | End: 2020-02-26 | Stop reason: SDUPTHER

## 2020-02-26 RX ORDER — LISINOPRIL 5 MG/1
10 TABLET ORAL DAILY
Status: DISCONTINUED | OUTPATIENT
Start: 2020-02-26 | End: 2020-02-27 | Stop reason: HOSPADM

## 2020-02-26 RX ORDER — ONDANSETRON 2 MG/ML
4 INJECTION INTRAMUSCULAR; INTRAVENOUS
Status: DISCONTINUED | OUTPATIENT
Start: 2020-02-26 | End: 2020-02-26

## 2020-02-26 RX ORDER — ACETAMINOPHEN 325 MG/1
650 TABLET ORAL EVERY 6 HOURS PRN
Status: DISCONTINUED | OUTPATIENT
Start: 2020-02-26 | End: 2020-02-27 | Stop reason: HOSPADM

## 2020-02-26 RX ORDER — NICOTINE 21 MG/24HR
1 PATCH, TRANSDERMAL 24 HOURS TRANSDERMAL DAILY PRN
Status: DISCONTINUED | OUTPATIENT
Start: 2020-02-26 | End: 2020-02-27 | Stop reason: HOSPADM

## 2020-02-26 RX ORDER — ACETAMINOPHEN 325 MG/1
650 TABLET ORAL EVERY 4 HOURS PRN
Status: DISCONTINUED | OUTPATIENT
Start: 2020-02-26 | End: 2020-02-27 | Stop reason: HOSPADM

## 2020-02-26 RX ORDER — MIDAZOLAM HYDROCHLORIDE 1 MG/ML
INJECTION INTRAMUSCULAR; INTRAVENOUS PRN
Status: DISCONTINUED | OUTPATIENT
Start: 2020-02-26 | End: 2020-02-26 | Stop reason: SDUPTHER

## 2020-02-26 RX ORDER — ROSUVASTATIN CALCIUM 5 MG/1
10 TABLET, COATED ORAL DAILY
Status: DISCONTINUED | OUTPATIENT
Start: 2020-02-26 | End: 2020-02-27 | Stop reason: HOSPADM

## 2020-02-26 RX ORDER — ASPIRIN 81 MG/1
81 TABLET, CHEWABLE ORAL DAILY
Status: DISCONTINUED | OUTPATIENT
Start: 2020-02-26 | End: 2020-02-27 | Stop reason: HOSPADM

## 2020-02-26 RX ORDER — FENTANYL CITRATE 50 UG/ML
INJECTION, SOLUTION INTRAMUSCULAR; INTRAVENOUS PRN
Status: DISCONTINUED | OUTPATIENT
Start: 2020-02-26 | End: 2020-02-26 | Stop reason: SDUPTHER

## 2020-02-26 RX ORDER — PANTOPRAZOLE SODIUM 40 MG/1
40 TABLET, DELAYED RELEASE ORAL
Status: DISCONTINUED | OUTPATIENT
Start: 2020-02-26 | End: 2020-02-26

## 2020-02-26 RX ORDER — SODIUM CHLORIDE 0.9 % (FLUSH) 0.9 %
10 SYRINGE (ML) INJECTION PRN
Status: DISCONTINUED | OUTPATIENT
Start: 2020-02-26 | End: 2020-02-27 | Stop reason: HOSPADM

## 2020-02-26 RX ORDER — SODIUM CHLORIDE 0.9 % (FLUSH) 0.9 %
10 SYRINGE (ML) INJECTION EVERY 12 HOURS SCHEDULED
Status: DISCONTINUED | OUTPATIENT
Start: 2020-02-26 | End: 2020-02-27 | Stop reason: HOSPADM

## 2020-02-26 RX ORDER — FENTANYL CITRATE 50 UG/ML
50 INJECTION, SOLUTION INTRAMUSCULAR; INTRAVENOUS EVERY 5 MIN PRN
Status: DISCONTINUED | OUTPATIENT
Start: 2020-02-26 | End: 2020-02-26

## 2020-02-26 RX ORDER — AMLODIPINE BESYLATE 5 MG/1
5 TABLET ORAL 2 TIMES DAILY
Status: DISCONTINUED | OUTPATIENT
Start: 2020-02-26 | End: 2020-02-27 | Stop reason: HOSPADM

## 2020-02-26 RX ORDER — SODIUM CHLORIDE 9 MG/ML
INJECTION, SOLUTION INTRAVENOUS CONTINUOUS
Status: DISCONTINUED | OUTPATIENT
Start: 2020-02-26 | End: 2020-02-27

## 2020-02-26 RX ORDER — FLECAINIDE ACETATE 100 MG/1
100 TABLET ORAL 2 TIMES DAILY
Status: DISCONTINUED | OUTPATIENT
Start: 2020-02-26 | End: 2020-02-26

## 2020-02-26 RX ORDER — SODIUM CHLORIDE 9 MG/ML
INJECTION, SOLUTION INTRAVENOUS CONTINUOUS PRN
Status: DISCONTINUED | OUTPATIENT
Start: 2020-02-26 | End: 2020-02-26 | Stop reason: SDUPTHER

## 2020-02-26 RX ORDER — CLONIDINE HYDROCHLORIDE 0.1 MG/1
0.1 TABLET ORAL DAILY PRN
Status: DISCONTINUED | OUTPATIENT
Start: 2020-02-26 | End: 2020-02-27 | Stop reason: HOSPADM

## 2020-02-26 RX ORDER — ZOLPIDEM TARTRATE 5 MG/1
7.5 TABLET ORAL NIGHTLY PRN
Status: DISCONTINUED | OUTPATIENT
Start: 2020-02-26 | End: 2020-02-27 | Stop reason: HOSPADM

## 2020-02-26 RX ORDER — POTASSIUM CHLORIDE 7.45 MG/ML
10 INJECTION INTRAVENOUS
Status: DISPENSED | OUTPATIENT
Start: 2020-02-26 | End: 2020-02-26

## 2020-02-26 RX ORDER — ACETAMINOPHEN 650 MG/1
650 SUPPOSITORY RECTAL EVERY 6 HOURS PRN
Status: DISCONTINUED | OUTPATIENT
Start: 2020-02-26 | End: 2020-02-27 | Stop reason: HOSPADM

## 2020-02-26 RX ORDER — FENTANYL CITRATE 50 UG/ML
25 INJECTION, SOLUTION INTRAMUSCULAR; INTRAVENOUS EVERY 5 MIN PRN
Status: DISCONTINUED | OUTPATIENT
Start: 2020-02-26 | End: 2020-02-26

## 2020-02-26 RX ORDER — ONDANSETRON 2 MG/ML
4 INJECTION INTRAMUSCULAR; INTRAVENOUS EVERY 6 HOURS PRN
Status: DISCONTINUED | OUTPATIENT
Start: 2020-02-26 | End: 2020-02-27 | Stop reason: HOSPADM

## 2020-02-26 RX ORDER — PROPOFOL 10 MG/ML
INJECTION, EMULSION INTRAVENOUS CONTINUOUS PRN
Status: DISCONTINUED | OUTPATIENT
Start: 2020-02-26 | End: 2020-02-26 | Stop reason: SDUPTHER

## 2020-02-26 RX ORDER — POLYETHYLENE GLYCOL 3350 17 G/17G
17 POWDER, FOR SOLUTION ORAL DAILY PRN
Status: DISCONTINUED | OUTPATIENT
Start: 2020-02-26 | End: 2020-02-27 | Stop reason: HOSPADM

## 2020-02-26 RX ORDER — ZOLPIDEM TARTRATE 5 MG/1
7.5 TABLET ORAL NIGHTLY PRN
Status: DISCONTINUED | OUTPATIENT
Start: 2020-02-26 | End: 2020-02-26

## 2020-02-26 RX ORDER — MEPERIDINE HYDROCHLORIDE 50 MG/ML
12.5 INJECTION INTRAMUSCULAR; INTRAVENOUS; SUBCUTANEOUS EVERY 5 MIN PRN
Status: DISCONTINUED | OUTPATIENT
Start: 2020-02-26 | End: 2020-02-26

## 2020-02-26 RX ADMIN — ZOLPIDEM TARTRATE 5 MG: 5 TABLET ORAL at 01:52

## 2020-02-26 RX ADMIN — FENTANYL CITRATE 25 MCG: 50 INJECTION INTRAMUSCULAR; INTRAVENOUS at 15:51

## 2020-02-26 RX ADMIN — AMLODIPINE BESYLATE 5 MG: 5 TABLET ORAL at 23:15

## 2020-02-26 RX ADMIN — MIDAZOLAM HYDROCHLORIDE 1 MG: 1 INJECTION, SOLUTION INTRAMUSCULAR; INTRAVENOUS at 15:12

## 2020-02-26 RX ADMIN — MIDAZOLAM HYDROCHLORIDE 1 MG: 1 INJECTION, SOLUTION INTRAMUSCULAR; INTRAVENOUS at 15:09

## 2020-02-26 RX ADMIN — LIDOCAINE HYDROCHLORIDE 25 MG: 10 INJECTION, SOLUTION EPIDURAL; INFILTRATION; INTRACAUDAL; PERINEURAL at 15:14

## 2020-02-26 RX ADMIN — PANTOPRAZOLE SODIUM 40 MG: 40 TABLET, DELAYED RELEASE ORAL at 01:52

## 2020-02-26 RX ADMIN — FENTANYL CITRATE 25 MCG: 50 INJECTION INTRAMUSCULAR; INTRAVENOUS at 15:40

## 2020-02-26 RX ADMIN — DRONEDARONE 400 MG: 400 TABLET, FILM COATED ORAL at 23:15

## 2020-02-26 RX ADMIN — ZOLPIDEM TARTRATE 7.5 MG: 5 TABLET ORAL at 23:15

## 2020-02-26 RX ADMIN — SODIUM CHLORIDE: 900 INJECTION INTRAVENOUS at 14:53

## 2020-02-26 RX ADMIN — PROPOFOL 125 MCG/KG/MIN: 10 INJECTION, EMULSION INTRAVENOUS at 15:14

## 2020-02-26 RX ADMIN — FENTANYL CITRATE 50 MCG: 50 INJECTION INTRAMUSCULAR; INTRAVENOUS at 15:09

## 2020-02-26 ASSESSMENT — PULMONARY FUNCTION TESTS
PIF_VALUE: 0
PIF_VALUE: 0
PIF_VALUE: 1
PIF_VALUE: 0
PIF_VALUE: 1
PIF_VALUE: 0
PIF_VALUE: 1
PIF_VALUE: 0
PIF_VALUE: 1
PIF_VALUE: 0

## 2020-02-26 ASSESSMENT — PAIN SCALES - GENERAL
PAINLEVEL_OUTOF10: 0

## 2020-02-26 NOTE — CONSULTS
Attestation signed by      Attending Physician Statement:    I have discussed the care of  Johnie Sargent , including pertinent history and exam findings, with the Cardiology fellow/resident. I have seen and examined the patient and the key elements of all parts of the encounter have been performed by me. I agree with the assessment, plan and orders as documented by the fellow/resident, after I modified exam findings and plan of treatments, and the final version is my approved version of the assessment. Additional Comments:   AF talked in detail about either CRISTÓBAL/CV or ablation   Pt would like to talk wit EP   Dr Taylor Pfeiffer will see today   Dr. Kamari Langston Cardiology Cardiology    Consult / H&P               Today's Date: 2/26/2020  Patient Name: Johnie Sargent  Date of admission: 2/25/2020  8:53 PM  Patient's age: 71 y.o., 1950  Admission Dx: Atrial flutter ([address] [I48.92]  Atrial flutter ([address] [I48.92]  Atrial flutter ([address] [I48.92]    Reason for Consult:  Cardiac evaluation    Requesting Physician: Abigail Love MD    CHIEF COMPLAINT:  Palpitations     History Obtained From:  patient, electronic medical record    HISTORY OF PRESENT ILLNESS:    Johnie Sargent 71 y.o. male presented with symptoms of palpitations. He noticed his heart rate was high. He was noticed to be in Atrial Flutter in ER. He has known h/o Aflutter with need for cardioversion approx 10 years ago. He follows with Dr. Leandro Alanis and has been on Flecainide. 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     Past Medical History:   has a past medical history of Abdominal pain, Benign essential HTN, Cough, Elevated CPK, Elevated PSA measurement, Frequent PVCs, Gout, Hyperlipidemia, Hypertension, Leukocytosis, Need for prophylactic vaccination and inoculation against varicella, Osteoarthritis, Osteoarthrosis, Primary insomnia, Prostate cancer ([address] Rising PSA level, and Sleep nitroGLYCERIN (NITROSTAT) 0.4 MG SL tablet Place 0.4 mg under the tongue every 5 minutes as needed  3/20/18   Historical Provider, MD      Current Facility-Administered Medications: allopurinol (ZYLOPRIM) tablet 300 mg, 300 mg, Oral, Daily  amLODIPine (NORVASC) tablet 5 mg, 5 mg, Oral, BID  cloNIDine (CATAPRES) tablet 0.1 mg, 0.1 mg, Oral, Daily PRN  flecainide (TAMBOCOR) tablet 100 mg, 100 mg, Oral, BID  furosemide (LASIX) tablet 20 mg, 20 mg, Oral, Daily  lisinopril (PRINIVIL;ZESTRIL) tablet 10 mg, 10 mg, Oral, Daily  rosuvastatin (CRESTOR) tablet 10 mg, 10 mg, Oral, Daily  sodium chloride flush 0.9 % injection 10 mL, 10 mL, Intravenous, 2 times per day  sodium chloride flush 0.9 % injection 10 mL, 10 mL, Intravenous, PRN  acetaminophen (TYLENOL) tablet 650 mg, 650 mg, Oral, Q6H PRN  acetaminophen (TYLENOL) suppository 650 mg, 650 mg, Rectal, Q6H PRN  polyethylene glycol (GLYCOLAX) packet 17 g, 17 g, Oral, Daily PRN  ondansetron (ZOFRAN) injection 4 mg, 4 mg, Intravenous, Q6H PRN  nicotine (NICODERM CQ) 21 MG/24HR 1 patch, 1 patch, Transdermal, Daily PRN  aspirin chewable tablet 81 mg, 81 mg, Oral, Daily  enoxaparin (LOVENOX) injection 100 mg, 1 mg/kg, Subcutaneous, BID  pantoprazole (PROTONIX) tablet 40 mg, 40 mg, Oral, QAM AC  zolpidem (AMBIEN) tablet 7.5 mg, 7.5 mg, Oral, Nightly PRN  potassium chloride 10 mEq/100 mL IVPB (Peripheral Line), 10 mEq, Intravenous, Q1H  metoprolol tartrate (LOPRESSOR) tablet 25 mg, 25 mg, Oral, BID  sodium chloride flush 0.9 % injection 10 mL, 10 mL, Intravenous, PRN    Allergies:  Phenothiazines; Sulfa antibiotics; and Prochlorperazine edisylate    Social History:   reports that he has never smoked. He has never used smokeless tobacco. He reports current alcohol use of about 2.0 standard drinks of alcohol per week. He reports that he does not use drugs.      Family History: family history includes Cancer in his maternal aunt; Kidney Cancer in his father; Prostate Cancer (age of in amplitude and contour without delay or bruit  · Peripheral pulses are symmetrical and full   Abdomen:   · No masses or tenderness  · Bowel sounds present  Extremities:  ·  No Cyanosis or Clubbing  ·  Lower extremity edema: No  ·  Skin: Warm and dry  Neurological:  · Alert and oriented. · Moves all extremities well  · No abnormalities of mood, affect, memory, mentation, or behavior are noted    DATA:    Diagnostics:      EKG:   Atrial Flutter with LBBB      Dr. Bear Luque 2/14/19:   1. Longstanding idiopathic PVCs and PACs. A 48-hour Holter monitor from 5/31/18 showed average sinus rate of 61 bpm, with a minimum of 47 and maximum of 95 bpm; 253 mostly isolated PVCs were noted, with no nonsustained ventricular tachycardia. A total of 860 PACs were noted; however, with no PAT or paroxysmal atrial fibrillation. 2. History of hypertension. 3. Hyperlipidemia. 4. History of osteoarthritis, on meloxicam.  5. A 2D echo from 6/15/18 showing LVEF 55%, with normal diastolic function, 2+ mitral regurgitation and mild to moderate tricuspid regurgitation. PA systolic of 47 mmHg. 6. Treadmill Cardiolite study from 6/15/18 showing normal perfusion study, with LVEF of 64%. The patient exercised over 8 minutes, with a peak heart rate of 133 bpm (86%). No arrhythmias were noted. 7. History of prostatism with elevated PSA.     Plan --  1. Frequent PAC's and PAT Maintaining good suppression on flecainide; will continue current dose. Will plan for 6 month follow-up  2. Essential HTN Maintaining good control on current regimen of amlodipine, clonidine, lisinopril and Lasix. 3. Hyperlipidemia Continue Crestor      Labs:     CBC:   Recent Labs     02/25/20  2100   WBC 4.4   HGB 14.8   HCT 44.5        BMP:   Recent Labs     02/25/20  2100      K 3.5*   CO2 22   BUN 19   CREATININE 1.03   LABGLOM >60   GLUCOSE 122*     BNP: No results for input(s): BNP in the last 72 hours.   PT/INR:   Recent Labs     02/25/20  2100

## 2020-02-26 NOTE — ED NOTES
Pt is updated on  [facility], he is requesting to speak with Dr. Zack Last and go via private auto.  Dr. Zack Last notified and in room to discuss     Darien Kasper RN  02/26/20 0010

## 2020-02-26 NOTE — ED PROVIDER NOTES
includes hip surgery (); Hand surgery; pr lap,prostatectomy,radical,w/nerve spare,incl robotic (N/A, 2018); Colonoscopy (N/A, 2019); and Upper gastrointestinal endoscopy (2019). CURRENT MEDICATIONS       Previous Medications    ALLOPURINOL (ZYLOPRIM) 300 MG TABLET    Take 3/4 tablet daily    AMLODIPINE (NORVASC) 10 MG TABLET    TAKE 1 TABLET BY MOUTH ONE TIME A DAY    CLONIDINE (CATAPRES) 0.1 MG TABLET    Take 1 tablet by mouth daily as needed for High Blood Pressure (150/90 or above)    FLECAINIDE (TAMBOCOR) 100 MG TABLET    Take 1 tablet by mouth 2 times daily    FUROSEMIDE (LASIX) 20 MG TABLET    Take 1 tablet by mouth daily    LISINOPRIL (PRINIVIL;ZESTRIL) 10 MG TABLET    Takes 1 pill in the am and 2 pills in the PM    MELOXICAM (MOBIC) 7.5 MG TABLET    Take 1 tablet by mouth 2 times daily    MULTIPLE VITAMIN (MULTI-VITAMIN) TABS    Take 1 tablet by mouth    NITROGLYCERIN (NITROSTAT) 0.4 MG SL TABLET    Place 0.4 mg under the tongue every 5 minutes as needed     PANTOPRAZOLE (PROTONIX) 40 MG TABLET    Take 1 tablet by mouth every morning (before breakfast)    ROSUVASTATIN (CRESTOR) 10 MG TABLET    Take 1 tablet by mouth daily    TOLTERODINE (DETROL) 2 MG TABLET    Take 2 mg by mouth as needed    ZOLPIDEM (AMBIEN) 5 MG TABLET    Take 1.5 tablets by mouth nightly as needed for Sleep for up to 180 days. Indications: pt take 1 1/2 a day       ALLERGIES     is allergic to phenothiazines; sulfa antibiotics; and prochlorperazine edisylate. FAMILY HISTORY     He indicated that his mother is alive. He indicated that his father is alive. He indicated that both of his sisters are alive. He indicated that the status of his paternal grandfather is unknown. He indicated that his maternal aunt is .      family history includes Cancer in his maternal aunt; Kidney Cancer in his father; Prostate Cancer (age of onset: 76) in his paternal grandfather; Prostate Cancer (age of onset: 71) in his father. SOCIAL HISTORY      reports that he has never smoked. He has never used smokeless tobacco. He reports current alcohol use of about 2.0 standard drinks of alcohol per week. He reports that he does not use drugs. PHYSICAL EXAM       ED Triage Vitals   BP Temp Temp Source Pulse Resp SpO2 Height Weight   02/25/20 2100 02/25/20 2101 02/25/20 2101 02/25/20 2100 02/25/20 2100 02/25/20 2100 02/25/20 2101 02/25/20 2101   (!) 152/100 97.9 °F (36.6 °C) Oral 121 16 97 % 5' 7\" (1.702 m) 220 lb (99.8 kg)       Constitutional: Alert, oriented x3, nontoxic, answering questions appropriately, acting properly for age, in no acute distress   HEENT: Extraocular muscles intact, mucus membranes moist, ,   Neck: Trachea midline   Cardiovascular: Regular rhythm and tachycardic no S3, S4, or murmurs   Respiratory: Clear to auscultation bilaterally no wheezes, rhonchi, rales, no respiratory distress no tachypnea no retractions no hypoxia  Gastrointestinal: Soft, nontender, nondistended, positive bowel sounds. No rebound, rigidity, or guarding. Musculoskeletal: No extremity pain or swelling no calf tenderness  Neurologic: Moving all 4 extremities without difficulty there are no gross focal neurologic deficits   Skin: Warm and dry       DIFFERENTIAL DIAGNOSIS/ MDM:     IV fluids and labs. Sinus tachycardia on the EKG. On keto diet possibly dehydrated. Denies chest pain. DIAGNOSTIC RESULTS     EKG: All EKG's are [facility] who either signs or Co-signs this chart in the absence of a cardiologist.    2055 sinus tachycardia rate 121    left bundle branch block pattern with appropriate discordance. No previous EKG available    2256 sinus tachycardia rate 118    left bundle branch block pattern no ST elevations.     Not indicated unless otherwise documented above    LABS:  Results for orders placed or performed during the hospital encounter of 02/25/20 CBC Auto Differential   Result Value Ref Range    WBC 4.4 3.5 - 11.0 k/uL    RBC 4.70 4.5 - 5.9 m/uL    Hemoglobin 14.8 13.5 - 17.5 g/dL    Hematocrit 44.5 41 - 53 %    MCV 94.8 80 - 100 fL    MCH 31.6 26 - 34 pg    MCHC 33.3 31 - 37 g/dL    RDW 14.0 12.5 - 15.4 %    Platelets 749 700 - 323 k/uL    MPV 8.5 6.0 - 12.0 fL    NRBC Automated NOT REPORTED per 100 WBC    Differential Type NOT REPORTED     Seg Neutrophils 66 36 - 66 %    Lymphocytes 14 (L) 24 - 44 %    Monocytes 15 (H) 2 - 11 %    Eosinophils % 4 1 - 4 %    Basophils 1 0 - 2 %    Immature Granulocytes NOT REPORTED 0 %    Segs Absolute 2.90 1.8 - 7.7 k/uL    Absolute Lymph # 0.60 (L) 1.0 - 4.8 k/uL    Absolute Mono # 0.70 0.1 - 1.2 k/uL    Absolute Eos # 0.20 0.0 - 0.4 k/uL    Basophils Absolute 0.00 0.0 - 0.2 k/uL    Absolute Immature Granulocyte NOT REPORTED 0.00 - 0.30 k/uL    WBC Morphology NOT REPORTED     RBC Morphology NOT REPORTED     Platelet Estimate NOT REPORTED    Basic Metabolic Panel   Result Value Ref Range    Glucose 122 (H) 70 - 99 mg/dL    BUN 19 8 - 23 mg/dL    CREATININE 1.03 0.70 - 1.20 mg/dL    Bun/Cre Ratio NOT REPORTED 9 - 20    Calcium 9.6 8.6 - 10.4 mg/dL    Sodium 137 135 - 144 mmol/L    Potassium 3.5 (L) 3.7 - 5.3 mmol/L    Chloride 100 98 - 107 mmol/L    CO2 22 20 - 31 mmol/L    Anion Gap 15 9 - 17 mmol/L    GFR Non-African American >60 >60 mL/min    GFR African American >60 >60 mL/min    GFR Comment          GFR Staging NOT REPORTED    Protime-INR   Result Value Ref Range    Protime 9.9 9.4 - 12.6 sec    INR 0.9    APTT   Result Value Ref Range    PTT 29.4 21.3 - 31.3 sec   Troponin   Result Value Ref Range    Troponin, High Sensitivity 21 0 - 22 ng/L    Troponin T NOT REPORTED <0.03 ng/mL    Troponin Interp NOT REPORTED    D-Dimer, Quantitative   Result Value Ref Range    D-Dimer, Quant 0.82 mg/L FEU   Lactic Acid   Result Value Ref Range    Lactic Acid 1.2 0.5 - 2.2 mmol/L   Brain Natriuretic Peptide   Result Value Ref DISPOSITION/PLAN   DISPOSITION Decision To Transfer 02/25/2020 11:08:51 PM        CONDITION ON DISPOSITION: STABLE       PATIENT REFERRED TO:  No follow-up provider specified.     DISCHARGE MEDICATIONS:  New Prescriptions    No medications on file       (Please note that portions of thisnote were completed with a voice recognition program.  Efforts were made to edit the dictations but occasionally words are mis-transcribed.)    Levin DO  Attending Emergency Physician        Ary Manzo, DO  02/25/20 0 [facility],   02/26/20 0022

## 2020-02-26 NOTE — H&P
swelling/edema   ALLERGIC/IMMUNOLOGIC:  negative for urticaria , itching  ENDOCRINE:  negative increase in drinking, increase in urination, hot or cold intolerance  MUSCULOSKELETAL:  negative joint pains, muscle aches, swelling of joints  NEUROLOGICAL:  negative for headaches, dizziness, lightheadedness, numbness, pain, tingling extremities  BEHAVIOR/PSYCH:  negative for depression, anxiety    Physical Exam:   BP (!) 152/94   Pulse 125   Temp 98.1 °F (36.7 °C) (Oral)   Resp 17   Ht 5' 7\" (1.702 m)   Wt 223 lb 4.8 oz (101.3 kg)   SpO2 95%   BMI 34.97 kg/m²   Temp (24hrs), Av °F (36.7 °C), Min:97.9 °F (36.6 °C), Max:98.1 °F (36.7 °C)    No results for input(s): POCGLU in the last 72 hours.     Intake/Output Summary (Last 24 hours) at 2020 0967  Last data filed at 2020 0645  Gross per 24 hour   Intake 50 ml   Output --   Net 50 ml       General Appearance: alert, well appearing, and in no acute distress  Mental status: oriented to person, place, and time  Head: normocephalic, atraumatic  Eye: no icterus, redness, pupils equal and reactive, extraocular eye movements intact, conjunctiva clear  Ear: normal external ear, no discharge, hearing intact  Nose: no drainage noted  Mouth: mucous membranes moist  Neck: supple, no carotid bruits, thyroid not palpable  Lungs: Bilateral equal air entry, clear to ausculation, no wheezing, rales or rhonchi, normal effort  Cardiovascular: Irregularly irregular rhythm with tachycardia, no murmur, gallop, rub  Abdomen: Soft, nontender, nondistended, normal bowel sounds, no hepatomegaly or splenomegaly  Neurologic: There are no new focal motor or sensory deficits, normal muscle tone and bulk, no abnormal sensation, normal speech, cranial nerves II through XII grossly intact  Skin: No gross lesions, rashes, bruising or bleeding on exposed skin area  Extremities: peripheral pulses palpable, trace bipedal edema or calf pain with palpation  Psych: normal Result Value Ref Range    Protime 9.9 9.4 - 12.6 sec    INR 0.9    APTT    Collection Time: 02/25/20  9:00 PM   Result Value Ref Range    PTT 29.4 21.3 - 31.3 sec   Troponin    Collection Time: 02/25/20  9:00 PM   Result Value Ref Range    Troponin, High Sensitivity 21 0 - 22 ng/L    Troponin T NOT REPORTED <0.03 ng/mL    Troponin Interp NOT REPORTED    D-Dimer, Quantitative    Collection Time: 02/25/20  9:00 PM   Result Value Ref Range    D-Dimer, Quant 0.82 mg/L FEU   Lactic Acid    Collection Time: 02/25/20  9:00 PM   Result Value Ref Range    Lactic Acid 1.2 0.5 - 2.2 mmol/L   Brain Natriuretic Peptide    Collection Time: 02/25/20  9:00 PM   Result Value Ref Range    Pro-BNP 55 <300 pg/mL    BNP Interpretation Pro-BNP Reference Range:    Urinalysis Reflex to Culture    Collection Time: 02/25/20  9:59 PM   Result Value Ref Range    Color, UA YELLOW YELLOW    Turbidity UA CLEAR CLEAR    Glucose, Ur NEGATIVE NEGATIVE    Bilirubin Urine NEGATIVE NEGATIVE    Ketones, Urine TRACE (A) NEGATIVE    Specific Gravity, UA 1.006 1.005 - 1.030    Urine Hgb NEGATIVE NEGATIVE    pH, UA 6.0 5.0 - 8.0    Protein, UA NEGATIVE NEGATIVE    Urobilinogen, Urine Normal Normal    Nitrite, Urine NEGATIVE NEGATIVE    Leukocyte Esterase, Urine TRACE (A) NEGATIVE    Urinalysis Comments NOT REPORTED    Microscopic Urinalysis    Collection Time: 02/25/20  9:59 PM   Result Value Ref Range    -          WBC, UA 10 TO 20 0 - 5 /HPF    RBC, UA 2 TO 5 0 - 2 /HPF    Casts UA NOT REPORTED /LPF    Crystals UA NOT REPORTED None /HPF    Epithelial Cells UA 0 TO 2 0 - 5 /HPF    Renal Epithelial, Urine NOT REPORTED 0 /HPF    Bacteria, UA NOT REPORTED None    Mucus, UA NOT REPORTED None    Trichomonas, UA NOT REPORTED None    Amorphous, UA NOT REPORTED None    Other Observations UA Culture ordered based on defined criteria. (A) NOT REQ.     Yeast, UA NOT REPORTED None   EKG 12 Lead    Collection Time: 02/25/20 10:56 PM   Result Value Ref Range Ventricular Rate 118 BPM    Atrial Rate 118 BPM    P-R Interval 160 ms    QRS Duration 132 ms    Q-T Interval 388 ms    QTc Calculation (Bazett) 543 ms    R Axis -55 degrees    T Axis 142 degrees   Troponin    Collection Time: 02/25/20 10:58 PM   Result Value Ref Range    Troponin, High Sensitivity 23 (H) 0 - 22 ng/L    Troponin T NOT REPORTED <0.03 ng/mL    Troponin Interp NOT REPORTED    Troponin    Collection Time: 02/26/20  1:49 AM   Result Value Ref Range    Troponin, High Sensitivity 27 (H) 0 - 22 ng/L    Troponin T NOT REPORTED <0.03 ng/mL    Troponin Interp NOT REPORTED    TSH with Reflex    Collection Time: 02/26/20  5:20 AM   Result Value Ref Range    TSH 4.71 0.30 - 5.00 mIU/L   Protime-INR    Collection Time: 02/26/20  5:20 AM   Result Value Ref Range    Protime 9.9 9.0 - 12.0 sec    INR 0.9    Troponin    Collection Time: 02/26/20  5:20 AM   Result Value Ref Range    Troponin, High Sensitivity PENDING 0 - 22 ng/L    Troponin T NOT REPORTED <0.03 ng/mL    Troponin Interp NOT REPORTED        Imaging/Diagnostics:  Ct Chest Pulmonary Embolism W Contrast    Result Date: 2/25/2020  1. Negative, limited CT PA. The peripheral branches within the lower lobes are suboptimally opacified. The main and central pulmonary arteries opacified normally, without evidence of emboli. 2. Extensive coronary arterial calcifications 3. Bibasilar subsegmental atelectasis       Assessment :      Hospital Problems           Last Modified POA    * (Principal) Atrial flutter with rapid ventricular response ([address] 2/26/2020 Yes    Benign essential HTN (Chronic) 2/26/2020 Yes    Asymptomatic premature ventricular contractions 2/26/2020 Yes    Hyperlipidemia 2/26/2020 Yes    Hypokalemia 2/26/2020 Yes          Plan:     Patient status inpatient in the Progressive Unit/Step down    1. Patient remains on amiodarone drip pending EP consult evaluation for EP studies with ablation  2.  Maintain patient on current regimen as set out by

## 2020-02-26 NOTE — CARE COORDINATION
Case Management Initial Discharge Plan  Rashmi Nolan             Met with:patient or spouse/SO to discuss discharge plans. Information verified: address, contacts, phone number, , insurance Yes  PCP: Yaakov Rhoades PA-C  Date of last visit: September    Insurance Provider: 2019    Discharge Planning    Living Arrangements:  Spouse/Significant Other   Support Systems:  Spouse/Significant Other    Home has 1 stories  0 stairs to climb to get into front door, n/a stairs to climb to reach second floor  Location of bedroom/bathroom in home main    Patient able to perform ADL's:Independent    Current Services (outpatient & in home) none  DME equipment: cpap  DME provider: [facility]      Potential Assistance Needed:       Patient agreeable to home care: No  Welling of choice provided:  n/a    Prior SNF/Rehab Placement and Facility: none  Agreeable to SNF/Rehab: No  Welling of choice provided: n/a   Evaluation: n/a    Expected Discharge date:     Patient expects to be discharged to:  Home  Follow Up Appointment: Best Day/ Time:      Transportation provider: wife  Transportation arrangements needed for discharge: No    Readmission Risk              Risk of Unplanned Readmission:        11             Does patient have a readmission risk score greater than 14?: No  If yes, follow-up appointment must be made within 7 days of discharge.      Goals of Care: maintain sinus rhythm      Discharge Plan: home independent            Electronically signed by Chandni Reyes RN on 20 at 5:39 PM

## 2020-02-26 NOTE — ED NOTES
Dr. Pippa Marcum in room to discuss results and plan of care with patient and spouse.  Pt remains on continuous cardiac/resp monitor, call light within reach     Ralph Escamilla RN  02/25/20 [id number]

## 2020-02-26 NOTE — ED NOTES
Called [employer] and spoke with Kirstin RN, hospitalist at [facility] [address]. V's paged per Dr. Anabella Rosario request          Edi Cao RN  02/25/20 [id number]

## 2020-02-26 NOTE — ANESTHESIA PRE PROCEDURE
Department of Anesthesiology  Preprocedure Note       Name:  Orville Jauregui   Age:  71 y.o.  :  1950                                          MRN:  [id number]         Date:  2020      Surgeon: * Surgery not found *    Procedure: CATH LAB WITH ANESTHESIA    Medications prior to admission:   Prior to Admission medications    Medication Sig Start Date End Date Taking? Authorizing Provider   amLODIPine (NORVASC) 10 MG tablet TAKE 1 TABLET BY MOUTH ONE TIME A DAY  Patient taking differently: Take 5 mg by mouth 2 times daily  19  Yes Jasmin Cunningham PA-C   pantoprazole (PROTONIX) 40 MG tablet Take 1 tablet by mouth every morning (before breakfast) 19  Yes Vitor Leonard MD   lisinopril (PRINIVIL;ZESTRIL) 10 MG tablet Takes 1 pill in the am and 2 pills in the PM 19  Yes Jasmin Cunningham PA-C   zolpidem (AMBIEN) 5 MG tablet Take 1.5 tablets by mouth nightly as needed for Sleep for up to 180 days.  Indications: pt take 1 1/2 a day 9/25/19 3/23/20 Yes Jasmin Cunningham PA-C   allopurinol (ZYLOPRIM) 300 MG tablet Take 3/4 tablet daily 3/7/19  Yes Jasmin Cunningham PA-C   cloNIDine (CATAPRES) 0.1 MG tablet Take 1 tablet by mouth daily as needed for High Blood Pressure (150/90 or above) 3/7/19  Yes Jasmin Cunningham PA-C   flecainide (TAMBOCOR) 100 MG tablet Take 1 tablet by mouth 2 times daily 3/7/19  Yes Jasmin Cunningham PA-C   meloxicam (MOBIC) 7.5 MG tablet Take 1 tablet by mouth 2 times daily 3/7/19  Yes Jasmin Cunningham PA-C   rosuvastatin (CRESTOR) 10 MG tablet Take 1 tablet by mouth daily 3/7/19  Yes Jasmin Cunningham PA-C   Multiple Vitamin (MULTI-VITAMIN) TABS Take 1 tablet by mouth   Yes Historical Provider, MD   tolterodine (DETROL) 2 MG tablet Take 2 mg by mouth as needed    Historical Provider, MD   furosemide (LASIX) 20 MG tablet Take 1 tablet by mouth daily  Patient taking differently: Take 20 mg by mouth daily Indications: as needed  3/7/19   Jasmin Cunningham PA-C DISSECTION performed by Norma Rai MD at [address]  11/6/2019    EGD BIOPSY performed by Molly Rosa MD at [facility]       Social History:    Social History     Tobacco Use    Smoking status: Never Smoker    Smokeless tobacco: Never Used   Substance Use Topics    Alcohol use: Yes     Alcohol/week: 2.0 standard drinks     Types: 2 Cans of beer per week                                Counseling given: Not Answered      Vital Signs (Current):   Vitals:    02/25/20 2324 02/26/20 0113 02/26/20 0946 02/26/20 1132   BP: (!) 119/98 (!) 152/94 (!) 112/93 (!) 126/95   Pulse: 119 125 133    Resp: 17 17 17 15   Temp:  98.1 °F (36.7 °C) 98.4 °F (36.9 °C) 98.3 °F (36.8 °C)   TempSrc:  Oral     SpO2: 94% 95% 94% 95%   Weight:  223 lb 4.8 oz (101.3 kg)     Height:  5' 7\" (1.702 m)                                                BP Readings from Last 3 Encounters:   02/26/20 (!) 126/95   11/06/19 120/63   11/06/19 127/83       NPO Status:                                                                                 BMI:   Wt Readings from Last 3 Encounters:   02/26/20 223 lb 4.8 oz (101.3 kg)   11/06/19 225 lb (102.1 kg)   10/09/19 225 lb (102.1 kg)     Body mass index is 34.97 kg/m².     CBC:   Lab Results   Component Value Date    WBC 4.4 02/25/2020    RBC 4.70 02/25/2020    HGB 14.8 02/25/2020    HCT 44.5 02/25/2020    MCV 94.8 02/25/2020    RDW 14.0 02/25/2020     02/25/2020       CMP:   Lab Results   Component Value Date     02/26/2020    K 4.0 02/26/2020    K 3.5 11/17/2018     02/26/2020    CO2 18 02/26/2020    BUN 17 02/26/2020    CREATININE 0.89 02/26/2020    GFRAA >60 02/26/2020    LABGLOM >60 02/26/2020    LABGLOM 84 11/11/2018    GLUCOSE 92 02/26/2020    PROT 6.4 02/26/2020    CALCIUM 9.1 02/26/2020    BILITOT 0.29 02/26/2020    ALKPHOS 72 02/26/2020    AST 24 02/26/2020    ALT 19 02/26/2020       POC Tests: No results for input(s): POCGLU,

## 2020-02-26 NOTE — CONSULTS
[facility] electrophysiology   Consult Note             Date:   2/26/2020  Patient name: Chelsea Francois  Date of admission:  2/25/2020  8:53 PM  MRN:   [id number]  YOB: 1950    Reason for Admission: Atrial flutter    CHIEF COMPLAINT: Palpitations    HISTORY OF PRESENT ILLNESS:      The patient is a 71 y.o.  male who is admitted to the hospital for palpitations and shortness of breath. Diagnosed with typical isthmus dependent flutter and electrophysiology was consulted for possible electrophysiology study and ablation. Had already been seen by general cardiology he has a history of premature ventricular complexes and had been on flecainide. Past Medical History:   has a past medical history of Abdominal pain, Benign essential HTN, Cough, Elevated CPK, Elevated PSA measurement, Frequent PVCs, Gout, Hyperlipidemia, Hypertension, Leukocytosis, Need for prophylactic vaccination and inoculation against varicella, Osteoarthritis, Osteoarthrosis, Primary insomnia, Prostate cancer ([address] Rising PSA level, and Sleep apnea. Past Surgical History:   has a past surgical history that includes hip surgery (2012); Hand surgery; pr lap,prostatectomy,radical,w/nerve spare,incl robotic (N/A, 11/9/2018); Colonoscopy (N/A, 11/6/2019); and Upper gastrointestinal endoscopy (11/6/2019). Home Medications:    Prior to Admission medications    Medication Sig Start Date End Date Taking?  Authorizing Provider   amLODIPine (NORVASC) 10 MG tablet TAKE 1 TABLET BY MOUTH ONE TIME A DAY  Patient taking differently: Take 5 mg by mouth 2 times daily  12/30/19  Yes Frances Rooney PA-C   pantoprazole (PROTONIX) 40 MG tablet Take 1 tablet by mouth every morning (before breakfast) 11/6/19  Yes Nuvia Mcclelland MD   lisinopril (PRINIVIL;ZESTRIL) 10 MG tablet Takes 1 pill in the am and 2 pills in the PM 9/25/19  Yes Frances Rooney PA-C   zolpidem (AMBIEN) 5 MG tablet Take 1.5 tablets by mouth nightly as Constitutional and General Appearance: alert, cooperative, no distress and appears stated age  [de-identified]: PERRL, no cervical lymphadenopathy. No masses palpable. Normal oral mucosa  Respiratory:  · Normal excursion and expansion without use of accessory muscles  · Resp Auscultation: Good respiratory effort. No for increased work of breathing. On auscultation: clear to auscultation bilaterally  Cardiovascular:  · The apical impulse is not displaced  · Heart tones are crisp and normal. irregular S1 and S2.  · Jugular venous pulsation Normal  · The carotid upstroke is normal in amplitude and contour without delay or bruit  · Peripheral pulses are symmetrical and full   Abdomen:  · No masses or tenderness  · Bowel sounds present  Extremities:  ·  No Cyanosis or Clubbing  ·  Lower extremity edema: No  ·  Skin: Warm and dry  Neurological:  · Alert and oriented. · Moves all extremities well  · No abnormalities of mood, affect, memory, mentation, or behavior are noted    DATA:    Diagnostics:      EKG: Atrial flutter with rapid ventricular response      Labs:     CBC:   Recent Labs     02/25/20 2100   WBC 4.4   HGB 14.8   HCT 44.5        BMP:   Recent Labs     02/25/20  2100 02/26/20  0520    141   K 3.5* 4.0   CO2 22 18*   BUN 19 17   CREATININE 1.03 0.89   LABGLOM >60 >60   GLUCOSE 122* 92     BNP: No results for input(s): BNP in the last 72 hours. PT/INR:   Recent Labs     02/25/20  2100 02/26/20  0520   PROTIME 9.9 9.9   INR 0.9 0.9     APTT:  Recent Labs     02/25/20 2100   APTT 29.4     CARDIAC ENZYMES:No results for input(s): CKTOTAL, CKMB, CKMBINDEX, TROPONINI in the last 72 hours.   FASTING LIPID PANEL:  Lab Results   Component Value Date    HDL 49 12/18/2019    TRIG 216 12/18/2019     LIVER PROFILE:  Recent Labs     02/26/20  0520   AST 24   ALT 19   LABALBU 3.8         IMPRESSION:    Patient Active Problem List   Diagnosis    Benign essential HTN    Primary insomnia    Pro[facility] ([facility]    Gout    Hyperlipidemia    Hypertension    Osteoarthrosis    Atrial flutter (HCC)    Hypokalemia       RECOMMENDATIONS:  1. Patient with atrial flutter with rapid ventricle response. This has failed medical therapy. I will recommend electrophysiology study and ablation. The procedure was discussed in detail including venous access mapping and ablation. The potential complication of the procedure including bleeding, vessel perforation, stroke heart attack were discussed. Patient understood and wished to proceed all his questions were answered to satisfaction. Discussed with patient and nursing.     Lisette Shah MD  [facility] cardiology

## 2020-02-27 VITALS
HEART RATE: 75 BPM | WEIGHT: 221.5 LBS | RESPIRATION RATE: 12 BRPM | SYSTOLIC BLOOD PRESSURE: 130 MMHG | TEMPERATURE: 97.5 F | HEIGHT: 67 IN | OXYGEN SATURATION: 93 % | BODY MASS INDEX: 34.76 KG/M2 | DIASTOLIC BLOOD PRESSURE: 90 MMHG

## 2020-02-27 LAB
CULTURE: ABNORMAL
HCT VFR BLD CALC: 37.6 % (ref 40.7–50.3)
HEMOGLOBIN: 12.3 G/DL (ref 13–17)
LV EF: 48 %
LVEF MODALITY: NORMAL
Lab: ABNORMAL
MCH RBC QN AUTO: 31.5 PG (ref 25.2–33.5)
MCHC RBC AUTO-ENTMCNC: 32.7 G/DL (ref 28.4–34.8)
MCV RBC AUTO: 96.4 FL (ref 82.6–102.9)
NRBC AUTOMATED: 0 PER 100 WBC
PDW BLD-RTO: 13.8 % (ref 11.8–14.4)
PLATELET # BLD: 211 K/UL (ref 138–453)
PMV BLD AUTO: 10.1 FL (ref 8.1–13.5)
RBC # BLD: 3.9 M/UL (ref 4.21–5.77)
SPECIMEN DESCRIPTION: ABNORMAL
WBC # BLD: 5.7 K/UL (ref 3.5–11.3)

## 2020-02-27 PROCEDURE — 93306 TTE W/DOPPLER COMPLETE: CPT

## 2020-02-27 PROCEDURE — 6360000002 HC RX W HCPCS: Performed by: INTERNAL MEDICINE

## 2020-02-27 PROCEDURE — 85027 COMPLETE CBC AUTOMATED: CPT

## 2020-02-27 PROCEDURE — 2580000003 HC RX 258: Performed by: INTERNAL MEDICINE

## 2020-02-27 PROCEDURE — 36415 COLL VENOUS BLD VENIPUNCTURE: CPT

## 2020-02-27 PROCEDURE — 99239 HOSP IP/OBS DSCHRG MGMT >30: CPT | Performed by: INTERNAL MEDICINE

## 2020-02-27 PROCEDURE — 94761 N-INVAS EAR/PLS OXIMETRY MLT: CPT

## 2020-02-27 PROCEDURE — 6370000000 HC RX 637 (ALT 250 FOR IP): Performed by: INTERNAL MEDICINE

## 2020-02-27 RX ADMIN — PANTOPRAZOLE SODIUM 40 MG: 40 TABLET, DELAYED RELEASE ORAL at 07:29

## 2020-02-27 RX ADMIN — Medication 10 ML: at 08:02

## 2020-02-27 RX ADMIN — ALLOPURINOL 300 MG: 300 TABLET ORAL at 08:04

## 2020-02-27 RX ADMIN — AMLODIPINE BESYLATE 5 MG: 5 TABLET ORAL at 08:04

## 2020-02-27 RX ADMIN — FUROSEMIDE 20 MG: 20 TABLET ORAL at 08:04

## 2020-02-27 RX ADMIN — ROSUVASTATIN CALCIUM 10 MG: 5 TABLET, COATED ORAL at 08:02

## 2020-02-27 RX ADMIN — DRONEDARONE 400 MG: 400 TABLET, FILM COATED ORAL at 08:04

## 2020-02-27 RX ADMIN — ACETAMINOPHEN 650 MG: 325 TABLET ORAL at 03:36

## 2020-02-27 RX ADMIN — LISINOPRIL 10 MG: 5 TABLET ORAL at 08:04

## 2020-02-27 RX ADMIN — ASPIRIN 81 MG: 81 TABLET, CHEWABLE ORAL at 08:04

## 2020-02-27 ASSESSMENT — PAIN SCALES - GENERAL: PAINLEVEL_OUTOF10: 6

## 2020-02-27 NOTE — CARE COORDINATION
Discharge [facility] Case Management Department  Written by: Ernestina Jones RN    Patient Name: Romulo Alfaro  Attending Provider: No att. providers found  Admit Date: 2020  8:53 PM  MRN: [id number]  Account: [de-identified]                     : 1950  Discharge Date: 2020      Disposition: home    Ernestina Jones RN

## 2020-02-27 NOTE — PROGRESS NOTES
Echo completed at the bedside
Patient arrived in [facility], consent signed, all questions answered. Pt ready for procedure. Call light to reach with side rails up 2 of 2. Bilat groin clipped. Wife at bedside with patient.     Dr. Ingris Berman visited
Smoking Cessation - topics covered   []  Health Risks  []  Benefits of Quitting   []  Smoking Cessation  [x]  Patient has no history of tobacco use per note in significant history. []  Patient is former smoker per note in significant history. Patient quit in   [x]  No need for tobacco cessation education. []  Booklet given  []  Patient verbalizes understanding. []  Patient denies need for tobacco cessation education. []  Unable to meet with patient today. Will follow up as able.   Hannah Dai  10:25 AM
Smoking Cessation - topics covered   []  Health Risks  []  Benefits of Quitting   []  Smoking Cessation  [x]  Patient has no history of tobacco use per note in significant history. []  Patient is former smoker per note in significant history. Patient quit in   [x]  No need for tobacco cessation education. []  Booklet given  []  Patient verbalizes understanding. []  Patient denies need for tobacco cessation education. []  Unable to meet with patient today. Will follow up as able.   Jhonatan Mayen  10:19 AM
Upon RN entering room, RN observed Veronica Max RN and Fabricio Pichardo RN holding pressure to pt's right femoral cath site, pt stated he tried to get up to use the urinal at bedside and noted a large amount of blood down his legs and feet. Patient was then put back into bed by RN's, blood pressure cycling every 5 mins, Jordyn Akins RN held pressure to site for 15 minutes before SafeGaurd device was applied to site. SafeGuard is fully intact, with no oozing or hematoma noted at this time, cath site remains soft to touch. Patient denies any dizziness/CP/SOB upon assessment. All pulses are palpable, extremities are warm to touch, pt states no numbness or tingling. External condom cath was applied to patient by RN to inhibit any further incidents from occurring. Patient instructed to lie flat for at least 2 hours, patient acknowledged. Patient resting comfortably at this time, NAD noted. Bed alarm on. Will continue to monitor.
mapping with a carto mapping system\  Comprehensive electrophysiology study  Patient to be monitored overnight and then discharged home tomorrow. Discontinue flecainide and use multaq instead for rhythm control of PVCs if needed. Objective:   Vitals: BP (!) 130/90   Pulse 75   Temp 97.5 °F (36.4 °C) (Oral)   Resp 12   Ht 5' 7\" (1.702 m)   Wt 221 lb 8 oz (100.5 kg)   SpO2 93%   BMI 34.69 kg/m²   General appearance: alert and cooperative with exam  HEENT: Head: Normocephalic, no lesions, without obvious abnormality. Neck:no JVD, trachea midline, no adenopathy  Lungs: Clear to auscultation  Heart: Regular rate and rhythm, s1/s2 auscultated, no murmurs SR on monitor with PVCs  Abdomen: soft, non-tender, bowel sounds active  Extremities: no edema  Neurologic: not done  Right Femoral Artery site: CDI Without ecchymosis or hematoma        Assessment / Acute Cardiac Problems:   1. A. Flutter with RVR  2. Frequent PAC's and PAT  3. HTN  4. Hyperlipidemia    Patient Active Problem List:     Benign essential HTN     Asymptomatic premature ventricular contractions     Primary insomnia     Prostate cancer ([facility] [address]     Gout     Hyperlipidemia     Hypertension     Osteoarthrosis     Atrial flutter (HCC)     Hypokalemia     Atrial flutter with rapid ventricular response ([facility] [address]      Plan of Treatment:   1. A Flutter with RVR s/p ablation per Dr. Erik Momin. Currently SR with PVCs on monitor. Multaq was started per Dr. Erik Momin. Dr Erik Momin was at bedside to answer patient and wife's questions and concerns regarding Multaq. 2. HTN borderline today. Continue CCB and  ACE  3. Awaiting results of echocardiogram.  4. Discussed patient with Dr. Erik Momin. Okay to discharge per cardiology after echocardiogram is completed with f/u in clinic in one week with Dr. Eirk Momin.   5. Follow up scheduled with 3/12/20 with Dr. Jaylen Jalloh     Electronically signed by JHONNY Hyman NP on 2/27/2020 at 10:27 [facility]
tablet 5 mg, 5 mg, Oral, BID  cloNIDine (CATAPRES) tablet 0.1 mg, 0.1 mg, Oral, Daily PRN  flecainide (TAMBOCOR) tablet 100 mg, 100 mg, Oral, BID  furosemide (LASIX) tablet 20 mg, 20 mg, Oral, Daily  lisinopril (PRINIVIL;ZESTRIL) tablet 10 mg, 10 mg, Oral, Daily  rosuvastatin (CRESTOR) tablet 10 mg, 10 mg, Oral, Daily  sodium chloride flush 0.9 % injection 10 mL, 10 mL, Intravenous, 2 times per day  sodium chloride flush 0.9 % injection 10 mL, 10 mL, Intravenous, PRN  acetaminophen (TYLENOL) tablet 650 mg, 650 mg, Oral, Q6H PRN  acetaminophen (TYLENOL) suppository 650 mg, 650 mg, Rectal, Q6H PRN  polyethylene glycol (GLYCOLAX) packet 17 g, 17 g, Oral, Daily PRN  ondansetron (ZOFRAN) injection 4 mg, 4 mg, Intravenous, Q6H PRN  nicotine (NICODERM CQ) 21 MG/24HR 1 patch, 1 patch, Transdermal, Daily PRN  aspirin chewable tablet 81 mg, 81 mg, Oral, Daily  enoxaparin (LOVENOX) injection 100 mg, 1 mg/kg, Subcutaneous, BID  pantoprazole (PROTONIX) tablet 40 mg, 40 mg, Oral, QAM AC  zolpidem (AMBIEN) tablet 7.5 mg, 7.5 mg, Oral, Nightly PRN  sodium chloride flush 0.9 % injection 10 mL, 10 mL, Intravenous, PRN    Labs:     CBC:   Recent Labs     02/25/20  2100   WBC 4.4   HGB 14.8   HCT 44.5        BMP:   Recent Labs     02/25/20  2100      K 3.5*   CO2 22   BUN 19   CREATININE 1.03   LABGLOM >60   GLUCOSE 122*     PT/INR:   Recent Labs     02/25/20  2100 02/26/20  0520   PROTIME 9.9 9.9   INR 0.9 0.9     APTT:  Recent Labs     02/25/20  2100   APTT 29.4     CARDIAC ENZYMES:  Recent Labs     02/25/20  2258 02/26/20  0149 02/26/20  0520   TROPHS 23* 27* PENDING     FASTING LIPID PANEL:  Lab Results   Component Value Date    HDL 49 12/18/2019    TRIG 216 12/18/2019     Dexter Wilson, [facility]   Pager: [phone]

## 2020-02-27 NOTE — DISCHARGE SUMMARY
[facility] [address]    Discharge Summary     Patient ID: Lilian Rosales  :     MRN: [id number]     ACCOUNT:  [de-identified]   Patient's PCP: Rosanna Mcfarlane PA-C  Admit Date: 2020   Discharge Date: 2020    Length of Stay: 2  Code Status:  Full Code  Admitting Physician: Regan Cross MD  Discharge Physician: Regan Cross MD     Active Discharge Diagnoses:     Hospital Problem Lists:  Principal Problem:    Atrial flutter with rapid ventricular response ([address]  Active Problems:    Benign essential HTN    Asymptomatic premature ventricular contractions    Hyperlipidemia    Hypokalemia  Resolved Problems:    * No resolved hospital problems. *      Admission Condition:  fair     Discharged Condition: good    Hospital Stay:     Hospital Course:  Lilian Rosales is a 71 y.o. male who was admitted for the management of  Atrial flutter with rapid ventricular response ([address] , presented to ER with Tachycardia (Sudden onset after dinner around 7:30pm. [place] like his heart was racing.)  Lilian Rosales is a 71 y.o. Non-/non  male who presents with Tachycardia (Sudden onset after dinner around 7:30pm. [place] like his heart was racing.)   and is admitted to the hospital for the management of <principal problem not specified>. Patient had presented with palpitation and shortness of breath and was found to be in atrial flutter with rapid ventricular response. Of note, patient has history of PVCs and has been maintained on flecainide. He indicated 11 years ago, he had experienced similar symptoms with the a flutter with rapid ventricular response which was managed with resolution and since then has not had any further experience. He was seen this morning on amiodarone drip pending EP consult evaluation and recommendations.   Patient has a history of essential hypertension, dyslipidemia, gout, prostate CA status post radical prostatectomy with radiotherapy. He was chest pain-free when seen and examined and denied any fever or chills, dizziness or diaphoresis, nausea or vomiting. Patient had been admitted as indicated above with atrial flutter with rapid ventricular response. He has been maintained on amiodarone drip after bolus with no improvement in control of the heart rate. Patient was seen by cardiology consult who referred him to EP consult, due to the failed medical therapy. Patient had Electrophysiology study and radiofrequency catheter ablation. Both heart rate and rhythm had reverted to normal and Multaq was ordered and his flecainide discontinued. Patient was maintained in-house overnight with armando improvement and in no distress. He was discussed with EP consult who indicated patient to be discharged home on the Multaq instead of the flecainide and patient should see him in the office in 1 week. This was discussed with patient with the wife at the bedside. Patient was discharged in a stable state.     Physical Exam:      General Appearance: alert, well appearing, and in no acute distress  Mental status: oriented to person, place, and time  Head: normocephalic, atraumatic  Eye: no icterus, redness, pupils equal and reactive, extraocular eye movements intact, conjunctiva clear  Ear: normal external ear, no discharge, hearing intact  Nose: no drainage noted  Mouth: mucous membranes moist  Neck: supple, no carotid bruits, thyroid not palpable  Lungs: Bilateral equal air entry, clear to ausculation, no wheezing, rales or rhonchi, normal effort  Cardiovascular: REGULAR  rhythm with controlled rate, no murmur, gallop, rub  Abdomen: Soft, nontender, nondistended, normal bowel sounds, no hepatomegaly or splenomegaly  Neurologic: There are no new focal motor or sensory deficits, normal muscle tone and bulk, no abnormal sensation, normal speech, cranial nerves II through XII grossly intact  Skin: No gross lesions, rashes, bruising or bleeding on exposed skin area  Extremities: peripheral pulses palpable, trace bipedal edema or calf pain with palpation  Psych: normal affect      Significant therapeutic interventions: Electrophysiology study and radiofrequency catheter ablation    Significant Diagnostic Studies:   Labs / Micro:  CBC:   Lab Results   Component Value Date    WBC 5.7 02/27/2020    RBC 3.90 02/27/2020    HGB 12.3 02/27/2020    HCT 37.6 02/27/2020    MCV 96.4 02/27/2020    MCH 31.5 02/27/2020    MCHC 32.7 02/27/2020    RDW 13.8 02/27/2020     02/27/2020     BMP:    Lab Results   Component Value Date    GLUCOSE 92 02/26/2020     02/26/2020    K 4.0 02/26/2020    K 3.5 11/17/2018     02/26/2020    CO2 18 02/26/2020    ANIONGAP 16 02/26/2020    BUN 17 02/26/2020    CREATININE 0.89 02/26/2020    BUNCRER NOT REPORTED 02/26/2020    CALCIUM 9.1 02/26/2020    LABGLOM >60 02/26/2020    LABGLOM 84 11/11/2018    GFRAA >60 02/26/2020    GFR      02/26/2020    GFR NOT REPORTED 02/26/2020     PT/INR:    Lab Results   Component Value Date    PROTIME 9.9 02/26/2020    INR 0.9 02/26/2020     PTT:   Lab Results   Component Value Date    APTT 29.4 02/25/2020        Radiology:  Ct Chest Pulmonary Embolism W Contrast    Result Date: 2/25/2020  1. Negative, limited CT PA. The peripheral branches within the lower lobes are suboptimally opacified. The main and central pulmonary arteries opacified normally, without evidence of emboli. 2. Extensive coronary arterial calcifications 3. Bibasilar subsegmental atelectasis       Consultations:    Consults:     Final Specialist Recommendations/Findings:   IP CONSULT TO CARDIOLOGY      The patient was seen and examined on day of discharge and this discharge summary is in conjunction with any daily progress note from day of discharge.     Discharge plan:     Disposition: Home    Physician Follow Up:     Layton Clemente PA-C  [address]  [address] [facility]

## 2020-02-28 ENCOUNTER — TELEPHONE (OUTPATIENT)
Dept: RADIATION ONCOLOGY | Age: 70
End: 2020-02-28

## 2020-02-28 LAB
EKG ATRIAL RATE: 64 BPM
EKG P AXIS: 64 DEGREES
EKG P-R INTERVAL: 160 MS
EKG Q-T INTERVAL: 478 MS
EKG QRS DURATION: 140 MS
EKG QTC CALCULATION (BAZETT): 493 MS
EKG R AXIS: -25 DEGREES
EKG T AXIS: 174 DEGREES
EKG VENTRICULAR RATE: 64 BPM

## 2020-02-28 PROCEDURE — 93010 ELECTROCARDIOGRAM REPORT: CPT | Performed by: INTERNAL MEDICINE

## 2020-02-28 NOTE — TELEPHONE ENCOUNTER
Pt called requesting to change his f/u appt to Wed 3/11/20, [facility]. Appt changed.

## 2020-03-03 NOTE — ANESTHESIA POSTPROCEDURE EVALUATION
Department of Anesthesiology  Postprocedure Note    Patient: Keyona Colmenares  MRN: [id number]  YOB: 1950  Date of evaluation: 3/3/2020  Time:  12:07 AM     Procedure Summary     Date:  02/26/20 Room / Location:  [facility] Cath Lab    Anesthesia Start:  2632 Anesthesia Stop:  3735    Procedure:  CATH LAB WITH ANESTHESIA Diagnosis:      Scheduled Providers:  Romana Race, MD; JHONNY Rogers - CRNA Responsible Provider:  Romana Race, MD    Anesthesia Type:  general, MAC ASA Status:  3          Anesthesia Type: general, MAC    Maggy Phase I:      Maggy Phase II:      Last vitals: Reviewed and per EMR flowsheets.      POST-OP ANESTHESIA NOTE       BP (!) 130/90   Pulse 75   Temp 97.5 °F (36.4 °C) (Oral)   Resp 12   Ht 5' 7\" (1.702 m)   Wt 221 lb 8 oz (100.5 kg)   SpO2 93%   BMI 34.69 kg/m²    Pain Assessment: 0-10  Pain Level: 6     Anesthesia Post Evaluation    Patient location during evaluation: PACU  Patient participation: complete - patient participated  Level of consciousness: awake  Pain score: 6  Airway patency: patent  Nausea & Vomiting: no nausea and no vomiting  Complications: no  Cardiovascular status: hemodynamically stable  Respiratory status: acceptable  Hydration status: stable

## 2020-03-04 ENCOUNTER — OFFICE VISIT (OUTPATIENT)
Dept: PRIMARY CARE CLINIC | Age: 70
End: 2020-03-04
Payer: COMMERCIAL

## 2020-03-04 VITALS
HEART RATE: 62 BPM | DIASTOLIC BLOOD PRESSURE: 77 MMHG | TEMPERATURE: 97.2 F | WEIGHT: 231 LBS | OXYGEN SATURATION: 99 % | SYSTOLIC BLOOD PRESSURE: 120 MMHG | BODY MASS INDEX: 36.18 KG/M2

## 2020-03-04 LAB
BILIRUBIN, POC: NEGATIVE
BLOOD URINE, POC: NEGATIVE
CLARITY, POC: CLEAR
COLOR, POC: YELLOW
GLUCOSE URINE, POC: NEGATIVE
KETONES, POC: NORMAL
LEUKOCYTE EST, POC: NEGATIVE
NITRITE, POC: NEGATIVE
PH, POC: 6
PROTEIN, POC: NEGATIVE
SPECIFIC GRAVITY, POC: 1.02
UROBILINOGEN, POC: NEGATIVE

## 2020-03-04 PROCEDURE — 81003 URINALYSIS AUTO W/O SCOPE: CPT | Performed by: PHYSICIAN ASSISTANT

## 2020-03-04 PROCEDURE — 99397 PER PM REEVAL EST PAT 65+ YR: CPT | Performed by: PHYSICIAN ASSISTANT

## 2020-03-04 RX ORDER — ZOLPIDEM TARTRATE 5 MG/1
7.5 TABLET ORAL NIGHTLY PRN
Qty: 135 TABLET | Refills: 1 | Status: SHIPPED | OUTPATIENT
Start: 2020-03-04 | End: 2020-08-27

## 2020-03-04 RX ORDER — ALLOPURINOL 300 MG/1
TABLET ORAL
Qty: 90 TABLET | Refills: 2 | Status: SHIPPED | OUTPATIENT
Start: 2020-03-04 | End: 2020-11-30

## 2020-03-04 RX ORDER — ROSUVASTATIN CALCIUM 10 MG/1
10 TABLET, COATED ORAL DAILY
Qty: 90 TABLET | Refills: 2 | Status: SHIPPED | OUTPATIENT
Start: 2020-03-04 | End: 2020-11-30

## 2020-03-04 RX ORDER — AMLODIPINE BESYLATE 5 MG/1
10 TABLET ORAL DAILY
Qty: 180 TABLET | Refills: 2 | Status: SHIPPED | OUTPATIENT
Start: 2020-03-04 | End: 2020-11-30

## 2020-03-04 RX ORDER — MELOXICAM 7.5 MG/1
7.5 TABLET ORAL 2 TIMES DAILY
Qty: 180 TABLET | Refills: 2 | Status: ON HOLD
Start: 2020-03-04 | End: 2020-10-08 | Stop reason: HOSPADM

## 2020-03-04 ASSESSMENT — PATIENT HEALTH QUESTIONNAIRE - PHQ9
SUM OF ALL RESPONSES TO PHQ QUESTIONS 1-9: 0
2. FEELING DOWN, DEPRESSED OR HOPELESS: 0
SUM OF ALL RESPONSES TO PHQ9 QUESTIONS 1 & 2: 0
1. LITTLE INTEREST OR PLEASURE IN DOING THINGS: 0
SUM OF ALL RESPONSES TO PHQ QUESTIONS 1-9: 0

## 2020-03-04 ASSESSMENT — ENCOUNTER SYMPTOMS: CONSTIPATION: 1

## 2020-03-04 NOTE — PROGRESS NOTES
[address] [facility]  [facility]  [address] [place] [id number]  Dept: [phone]  Dept Fax: [phone]    Office Progress/Follow Up Note    Date of patient's visit: 3/4/2020    Patient's Name:  Keyona Colmenares YOB: 1950            Patient Care Team:  Tushar Marcelino PA-C as PCP - General (Physician Assistant)  Tushar Marcelino PA-C as PCP - [facility] Provider  Murray Padgett MD as Consulting Physician (Infectious Diseases)  Too Maloney RN as Nurse Navigator (Oncology)  Amberly Andujar MD as Consulting Physician (Urology)  Edna Barber MD as Consulting Physician (Cardiology)  ================================================================    REASON FOR VISIT/CHIEF COMPLAINT:  Annual Exam and Spasms (bladder spasms, blood in urine )    HISTORY OF PRESENTING ILLNESS:  History was obtained from: patient. Keyona Colmenares is a 71 y.o. is here for physical.  Patient states he is compliant with medications. Patient  went into the ER for palpitations, work-up revealed irregular heart rate, a flutter, patient was admitted and had a cardiac ablation. Patient was stopped on flecainide and started on dronedarone, patient has tolerated the switch. Patient states he will be following up with cardiology on 3/2020. Patient denies any episodes of heart palpitations. Patient has history of prostate cancer, completed radiation and Lupron. Patient voiced while admitted \"blood clot in urine that was concerning\". Patient provided urine sample in office, positive for ketones, otherwise unremarkable. Discussed insomnia in depth with patient, stable, patient requesting medication refill. Patient blood pressure controlled in office. Patient weight is stable. Patient states \"weight 220 at home\".  Discussed weight loss in depth with patient, encourage patient make changes in diet and the importance of physical activity by Appearance: Normal appearance. He is well-developed and well-groomed. He is obese. HENT:      Head: Normocephalic and atraumatic. Right Ear: External ear normal.      Left Ear: External ear normal.      Nose: Nose normal.   Eyes:      General: Lids are normal.      Conjunctiva/sclera: Conjunctivae normal.      Pupils: Pupils are equal, round, and reactive to light. Cardiovascular:      Rate and Rhythm: Normal rate and regular rhythm. Heart sounds: Normal heart sounds. Pulmonary:      Effort: Pulmonary effort is normal.      Breath sounds: Normal breath sounds. Abdominal:      Palpations: Abdomen is soft. There is no mass. Tenderness: There is no abdominal tenderness. Musculoskeletal:         General: No tenderness. Skin:     General: Skin is warm and dry. Neurological:      Mental Status: He is alert and oriented to person, place, and time. Psychiatric:         Behavior: Behavior is cooperative. DIAGNOSTIC FINDINGS:  CBC:  Lab Results   Component Value Date    WBC 5.7 02/27/2020    HGB 12.3 02/27/2020     02/27/2020       BMP:    Lab Results   Component Value Date     02/26/2020    K 4.0 02/26/2020    K 3.5 11/17/2018     02/26/2020    CO2 18 02/26/2020    BUN 17 02/26/2020    CREATININE 0.89 02/26/2020    GLUCOSE 92 02/26/2020       HEMOGLOBIN A1C:   Lab Results   Component Value Date    LABA1C 5.2 09/05/2019       FASTING LIPID PANEL:  Lab Results   Component Value Date    CHOL 179 12/18/2019    HDL 49 12/18/2019    TRIG 216 (H) 12/18/2019       ASSESSMENT AND PLAN:  Russ Robertson was seen today for annual exam and spasms. Diagnoses and all orders for this visit:    Hematuria, unspecified type  -     POCT Urinalysis No Micro (Auto)    Primary insomnia  -     zolpidem (AMBIEN) 5 MG tablet; Take 1.5 tablets by mouth nightly as needed for Sleep for up to 180 days.  Indications: pt take 1 1/2 a day    Class 2 severe obesity due to excess calories with serious comorbidity and body mass index (BMI) of 36.0 to 36.9 in adult [facility])    Atrial flutter, unspecified type (HCC)    Prostate cancer (HCC)    Medication refill  -     amLODIPine (NORVASC) 5 MG tablet; Take 2 tablets by mouth daily  -     rosuvastatin (CRESTOR) 10 MG tablet; Take 1 tablet by mouth daily  -     allopurinol (ZYLOPRIM) 300 MG tablet; Take 3/4 tablet daily  -     meloxicam (MOBIC) 7.5 MG tablet; Take 1 tablet by mouth 2 times daily      FOLLOW UP AND INSTRUCTIONS:  Return in about 6 months (around 9/4/2020) for HTN. · Seema Duran received counseling on the following healthy behaviors:nutrition and exercise    · Discussed use, benefit, and side effects of prescribed medications. Barriers to medication compliance addressed. All patient questions answered. Pt voiced understanding. · Patient given educational materials - see patient instructions    Electronically signed by Anna Salcedo PA-C on 3/4/20 at 12:05 PM    This note is created with the assistance of a speech-recognition program. While intendingto generate a document that actually reflects the content of the visit, the document can still have some mistakes which may not have been identified and corrected by editing.

## 2020-03-05 LAB
EKG ATRIAL RATE: 246 BPM
EKG P AXIS: -100 DEGREES
EKG Q-T INTERVAL: 384 MS
EKG QRS DURATION: 138 MS
EKG QTC CALCULATION (BAZETT): 549 MS
EKG R AXIS: -42 DEGREES
EKG T AXIS: 162 DEGREES
EKG VENTRICULAR RATE: 123 BPM

## 2020-03-12 ENCOUNTER — HOSPITAL ENCOUNTER (OUTPATIENT)
Age: 70
Discharge: HOME | End: 2020-03-12
Payer: COMMERCIAL

## 2020-03-12 LAB — PROSTATE SPECIFIC ANTIGEN: <0.01 UG/L

## 2020-03-12 PROCEDURE — 84153 ASSAY OF PSA TOTAL: CPT

## 2020-03-12 PROCEDURE — 36415 COLL VENOUS BLD VENIPUNCTURE: CPT

## 2020-03-13 ENCOUNTER — HOSPITAL ENCOUNTER (OUTPATIENT)
Dept: CARDIAC CATH/INVASIVE PROCEDURES | Age: 70
Discharge: HOME | End: 2020-03-13
Payer: COMMERCIAL

## 2020-03-13 PROCEDURE — 7100000001 HC PACU RECOVERY - ADDTL 15 MIN

## 2020-03-13 PROCEDURE — 93458 L HRT ARTERY/VENTRICLE ANGIO: CPT | Performed by: INTERNAL MEDICINE

## 2020-03-13 PROCEDURE — 7100000000 HC PACU RECOVERY - FIRST 15 MIN

## 2020-03-13 PROCEDURE — 6360000002 HC RX W HCPCS

## 2020-03-13 PROCEDURE — 2709999900 HC NON-CHARGEABLE SUPPLY

## 2020-03-13 PROCEDURE — C1769 GUIDE WIRE: HCPCS

## 2020-03-13 PROCEDURE — 2500000003 HC RX 250 WO HCPCS

## 2020-03-13 PROCEDURE — C1894 INTRO/SHEATH, NON-LASER: HCPCS

## 2020-03-13 PROCEDURE — 6360000004 HC RX CONTRAST MEDICATION

## 2020-03-13 PROCEDURE — C1725 CATH, TRANSLUMIN NON-LASER: HCPCS

## 2020-03-13 RX ORDER — CLONIDINE HYDROCHLORIDE 0.1 MG/1
TABLET ORAL
Qty: 90 TABLET | Refills: 3 | Status: SHIPPED | OUTPATIENT
Start: 2020-03-13 | End: 2020-12-21 | Stop reason: SDUPTHER

## 2020-03-13 RX ORDER — SODIUM CHLORIDE 0.9 % (FLUSH) 0.9 %
10 SYRINGE (ML) INJECTION EVERY 12 HOURS SCHEDULED
Status: CANCELLED | OUTPATIENT
Start: 2020-03-13

## 2020-03-13 RX ORDER — ACETAMINOPHEN 325 MG/1
650 TABLET ORAL EVERY 4 HOURS PRN
Status: CANCELLED | OUTPATIENT
Start: 2020-03-13

## 2020-03-13 RX ORDER — SODIUM CHLORIDE 9 MG/ML
INJECTION, SOLUTION INTRAVENOUS CONTINUOUS
Status: DISCONTINUED | OUTPATIENT
Start: 2020-03-13 | End: 2020-03-14 | Stop reason: HOSPADM

## 2020-03-13 RX ORDER — LISINOPRIL 10 MG/1
TABLET ORAL
Qty: 270 TABLET | Refills: 3 | Status: SHIPPED | OUTPATIENT
Start: 2020-03-13 | End: 2020-12-21 | Stop reason: SDUPTHER

## 2020-03-13 RX ORDER — SODIUM CHLORIDE 0.9 % (FLUSH) 0.9 %
10 SYRINGE (ML) INJECTION PRN
Status: CANCELLED | OUTPATIENT
Start: 2020-03-13

## 2020-03-13 RX ADMIN — SODIUM CHLORIDE: 9 INJECTION, SOLUTION INTRAVENOUS at 12:26

## 2020-03-13 NOTE — PROGRESS NOTES
Received post cath procedure to [facility] room 5. Assessment obtained. Restrictions reviewed with patient. Post procedure pathway initiatedwrist site soft , clean dry and intact. No hematoma noted. Family at side. Patient without complaints.

## 2020-03-13 NOTE — H&P
reaction    Sulfa Antibiotics Other (See Comments)    Prochlorperazine Edisylate Other (See Comments)     Lose control         REVIEW OF SYSTEMS:     A detailed review of system was performed as already noted and is otherwise as above. PHYSICAL EXAM:     There were no vitals filed for this visit. Constitutional and General Appearance: alert, cooperative, no distress and appears stated age  [de-identified]: PERRL, no cervical lymphadenopathy. No masses palpable. Normal oral mucosa  Respiratory:  · Normal excursion and expansion without use of accessory muscles  · Resp Auscultation: Good respiratory effort. No for increased work of breathing. On auscultation: clear to auscultation bilaterally  Cardiovascular:  · The apical impulse is not displaced  · Heart tones are crisp and normal. regular S1 and S2. Abdomen:  · No masses or tenderness  · Bowel sounds present  Extremities:  ·  No Cyanosis or Clubbing  ·  Lower extremity edema: No  · Skin: Warm and dry  Neurological:  · Alert and oriented. Assessment:  1. Stable Angina with coronary artery calcifications on the CT Chest  2. Typical flutter ablation by Dr Tegan Messina  3. Hypertension      Plan:  1. Proceed with planned LHC after discussing with Dr Leidy Qureshi  2. Further orders to follow.            Elo Cardona MD  Fellow, Cardiovascular Diseases    [address] [facility]

## 2020-03-13 NOTE — OP NOTE
[facility]        Date:   3/13/2020  Patient name:  Lianna William  Date of admission:  3/13/2020 12:00 PM  MRN:   [id number]  YOB: 1950    CARDIAC CATHETERIZATION    Operators:  Primary: Dr Dilma Bray    CV Fellow:     Procedure performed:     [x] Left Heart Catheterization. [] Graft Angiography. [x] Left Ventriculography. [] Right Heart Catheterization. [x] Coronary Angiography. [] Aortic Valve Studies. [] PCI:      [] Other:       Pre Procedure Conscious Sedation Data:    ASA Class:    [] I [x] II [] III [] IV    Mallampati Class:  [] I [x] II [] III [] IV      Indication: coronary artery calcification  [] STEMI      [] + Stress test  [] ACS      [] + EKG Changes  [] Non Q MI       [x] Significant Risk Factors  [] Recurrent Angina             [] Diabetes Mellitus    [] New LBBB      [] Uncontrolled HTN. [] CHF / Low EF changes     [x] Abnormal CTA / Ca Score  [] Other:     Procedure:  Access:  [] Femoral artery  [x] Radial  artery       [x] Right   [] Left    Procedure: After informed consent was obtained with explanation of the risks and benefits, patient was brought to the cath lab. The access area was prepped and draped in sterile fashion. 1% lidocaine was used for local block. The artery was cannulated with 6  Fr sheath with brisk arterial blood return. The side port was frequently flushed and aspirated with normal saline. Findings:       LMCA: Normal 0% stenosis. LAD: Normal 0% stenosis. Mid area calcified 25-30%  D1:M id stenosis 30%    LCx: Normal 0% stenosis. RCA: Normal 0% stenosis. Minimal distal disease     Coronary Tree      Dominance: Right      The LV gram was performed in the LOWE 30 position. LVEF: 55%. LV Wall Motion: Normal          Conclusions:     Non obstructive minimal disease in calcified LAD   Normal LV function      Recommendations:    Medical treatments   Risk factor modification.   History and Risk Factors    [x] Hypertension     [] Family history of CAD  [x] Hyperlipidemia     [] Cerebrovascular Disease   [] Prior MI       [] Peripheral Vascular disease   [] Prior PCI              [] Diabetes Mellitus    [] Left Main PCI. [] Currently on Dialysis. [] Prior CABG. [] Currently smoker. [] Cardiac Arrest outside of healthcare facility. [] Yes    [x] No        Witnessed     [] Yes   [] No     Arrest after arrival of EMS  [] Yes   [] No     [] Cardiac Arrest at other Facility. [] Yes   [x] No    Pre-Procedure Information. Heart Failure       [x] Yes    [] No        Class  [] I      [x] II  [] III    [] IV. New Diagnosis    [x] Yes  [] No    HF Type      [x] Systolic   [] Diastolic          [] Unknown. Diagnostic Test:   EKG       [] Normal   [x] Abnormal    New antiarrhythmia medications:    [x] Yes   [] No   New onset atrial fibrillation / Flutter     [x] Yes   [] No   ECG Abnormalities:      [] V. Fib   [] Jazlyn V. Tach           [] NS V. T   [] New LBBB           [] T. Inv  []  ST dev > 0.5 mm         [] PVC's freq  [] PVC's infrequent     S/P Ablation  Stress Test Performed:      [] Yes    [x] No          Cardiac CTA Performed:     [] Yes    [x] No   CT Chest reported calcification of arteries   Results   [] CAD   [] Non obstructive CAD      [] No CAD   [] Uncertain      [] Unknown   [] Structural Disease.      Pre Procedure Medications:   [x] Yes    [] No         [x] ASA  [] Beta Blockers      [] Nitrate  [x] Ca Channel Blockers      [] Ranolazine  [x] Statin       [] Plavix/Others antiplatelets      Estimated blood loss is 10 ml    Mare Buerger, MD  Fellow, [address]              I have reviewed the case / procedure with resident / fellow  I have examined the patient personally  Patient agree with treatment plan, correction innotes was made as appropriate, and discussed final arrangement based on  my evaluation and exam.    Risk and benefit of procedure if planned were explained in details. Procedure was performed by me, with all aspect of the procedure being done using standard protocol. Note was modified based on my own assessment and treatment.     Tobias Staff, MD  [facility]

## 2020-03-13 NOTE — PROGRESS NOTES
Patient admitted, consent signed and questions answered. Patient ready for procedure. Call light to reach with side rails up 2 of 2. groin clipped. wife at bedside with patient.   History and physical needs updated

## 2020-03-17 PROBLEM — I10 BENIGN ESSENTIAL HTN: Chronic | Status: RESOLVED | Noted: 2018-06-01 | Resolved: 2020-03-17

## 2020-03-17 ASSESSMENT — ENCOUNTER SYMPTOMS
NAUSEA: 0
DIARRHEA: 0
COUGH: 0
VOMITING: 0
WHEEZING: 0
SHORTNESS OF BREATH: 0
BACK PAIN: 0

## 2020-03-18 PROBLEM — Z85.46 HISTORY OF PROSTATE CANCER: Status: ACTIVE | Noted: 2020-03-18

## 2020-03-18 PROBLEM — R35.0 FREQUENCY OF MICTURITION: Status: ACTIVE | Noted: 2020-03-18

## 2020-03-18 PROBLEM — R31.0 GROSS HEMATURIA: Status: ACTIVE | Noted: 2020-03-18

## 2020-03-18 PROBLEM — N39.41 URGE INCONTINENCE: Status: ACTIVE | Noted: 2020-03-18

## 2020-03-26 ENCOUNTER — HOSPITAL ENCOUNTER (OUTPATIENT)
Dept: ULTRASOUND IMAGING | Age: 70
Discharge: HOME | End: 2020-03-28
Payer: COMMERCIAL

## 2020-03-26 PROCEDURE — 76770 US EXAM ABDO BACK WALL COMP: CPT

## 2020-05-08 ENCOUNTER — TELEPHONE (OUTPATIENT)
Dept: PRIMARY CARE CLINIC | Age: 70
End: 2020-05-08

## 2020-06-12 ENCOUNTER — TELEPHONE (OUTPATIENT)
Dept: ONCOLOGY | Age: 70
End: 2020-06-12

## 2020-06-29 ENCOUNTER — HOSPITAL ENCOUNTER (OUTPATIENT)
Age: 70
Discharge: HOME | End: 2020-06-29
Payer: COMMERCIAL

## 2020-06-29 LAB — PROSTATE SPECIFIC ANTIGEN: <0.01 UG/L

## 2020-06-29 PROCEDURE — 84153 ASSAY OF PSA TOTAL: CPT

## 2020-06-29 PROCEDURE — 36415 COLL VENOUS BLD VENIPUNCTURE: CPT

## 2020-08-27 RX ORDER — ZOLPIDEM TARTRATE 5 MG/1
TABLET ORAL
Qty: 135 TABLET | Refills: 1 | Status: SHIPPED | OUTPATIENT
Start: 2020-09-09 | End: 2020-12-23 | Stop reason: SDUPTHER

## 2020-08-27 RX ORDER — NITROGLYCERIN 0.4 MG/1
0.4 TABLET SUBLINGUAL EVERY 5 MIN PRN
Qty: 90 TABLET | Refills: 1 | Status: SHIPPED | OUTPATIENT
Start: 2020-08-27 | End: 2021-09-15 | Stop reason: SDUPTHER

## 2020-08-27 NOTE — TELEPHONE ENCOUNTER
Health Maintenance   Topic Date Due    Flu vaccine (1) 09/01/2020    Lipid screen  12/18/2020    Potassium monitoring  02/26/2021    Creatinine monitoring  02/26/2021    PSA counseling  06/29/2021    DTaP/Tdap/Td vaccine (2 - Td) 05/10/2027    Colon cancer screen colonoscopy  11/06/2029    Shingles Vaccine  Completed    Pneumococcal 65+ years Vaccine  Completed    Hepatitis C screen  Completed    Hepatitis A vaccine  Aged Out    Hepatitis B vaccine  Aged Out    Hib vaccine  Aged Out    Meningococcal (ACWY) vaccine  Aged Out             (applicable per patient's age: Cancer Screenings, Depression Screening, Fall Risk Screening, Immunizations)    Hemoglobin A1C (%)   Date Value   09/05/2019 5.2   03/26/2019 5.7     LDL Cholesterol (mg/dL)   Date Value   12/18/2019 87     AST (U/L)   Date Value   02/26/2020 24     ALT (U/L)   Date Value   02/26/2020 19     BUN (mg/dL)   Date Value   02/26/2020 17      (goal A1C is < 7)   (goal LDL is <100) need 30-50% reduction from baseline     BP Readings from Last 3 Encounters:   03/18/20 135/86   03/04/20 120/77   02/27/20 (!) 130/90    (goal /80)      All Future Testing planned in [facility]:  Lab Frequency Next Occurrence   Psa screening Once 43/41/7106   Basic Metabolic Panel Once 36/17/1498   PSA, Diagnostic Once 09/14/2020   PSA, Diagnostic         Next Visit Date:  Future Appointments   Date Time Provider [facility]   9/9/2020 11:40 AM Tammy Delarosa PA-C [facility]   9/23/2020  1:30 PM Quilla Siemens, MD [facility]   10/28/2020  3:00 PM Aurora Timmons MD Sports [facility]            Patient Active Problem List:     Asymptomatic premature ventricular contractions     Primary insomnia     Prostate cancer ([address]     Gout     Hyperlipidemia     Hypertension     Osteoarthrosis     Atrial flutter ([address]     Hypokalemia     Atrial flutter with rapid ventricular response ([address]     Frequency of micturition     Urge incontinence     History of prostate cancer     Gross hematuria

## 2020-09-16 ENCOUNTER — HOSPITAL ENCOUNTER (OUTPATIENT)
Age: 70
Discharge: HOME | End: 2020-09-16
Payer: COMMERCIAL

## 2020-09-16 LAB — PROSTATE SPECIFIC ANTIGEN: <0.01 UG/L

## 2020-09-16 PROCEDURE — 84153 ASSAY OF PSA TOTAL: CPT

## 2020-09-16 PROCEDURE — 36415 COLL VENOUS BLD VENIPUNCTURE: CPT

## 2020-09-23 PROBLEM — N28.1 RENAL CYST, RIGHT: Status: ACTIVE | Noted: 2020-09-23

## 2020-10-08 ENCOUNTER — HOSPITAL ENCOUNTER (OUTPATIENT)
Age: 70
Setting detail: OUTPATIENT SURGERY
Discharge: HOME | End: 2020-10-08
Attending: SPECIALIST | Admitting: SPECIALIST
Payer: COMMERCIAL

## 2020-10-08 ENCOUNTER — ANESTHESIA EVENT (OUTPATIENT)
Dept: OPERATING ROOM | Age: 70
End: 2020-10-08
Payer: COMMERCIAL

## 2020-10-08 ENCOUNTER — ANESTHESIA (OUTPATIENT)
Dept: OPERATING ROOM | Age: 70
End: 2020-10-08
Payer: COMMERCIAL

## 2020-10-08 VITALS — SYSTOLIC BLOOD PRESSURE: 138 MMHG | OXYGEN SATURATION: 99 % | DIASTOLIC BLOOD PRESSURE: 73 MMHG | TEMPERATURE: 96.7 F

## 2020-10-08 VITALS
RESPIRATION RATE: 15 BRPM | DIASTOLIC BLOOD PRESSURE: 83 MMHG | WEIGHT: 225 LBS | TEMPERATURE: 97.2 F | HEART RATE: 52 BPM | OXYGEN SATURATION: 99 % | HEIGHT: 68 IN | SYSTOLIC BLOOD PRESSURE: 136 MMHG | BODY MASS INDEX: 34.1 KG/M2

## 2020-10-08 PROBLEM — N30.40 RADIATION CYSTITIS: Status: ACTIVE | Noted: 2020-10-08

## 2020-10-08 LAB
SARS-COV-2, RAPID: NOT DETECTED
SARS-COV-2: NORMAL
SARS-COV-2: NORMAL
SOURCE: NORMAL

## 2020-10-08 PROCEDURE — 2500000003 HC RX 250 WO HCPCS: Performed by: NURSE ANESTHETIST, CERTIFIED REGISTERED

## 2020-10-08 PROCEDURE — 7100000001 HC PACU RECOVERY - ADDTL 15 MIN: Performed by: SPECIALIST

## 2020-10-08 PROCEDURE — 7100000000 HC PACU RECOVERY - FIRST 15 MIN: Performed by: SPECIALIST

## 2020-10-08 PROCEDURE — 7100000011 HC PHASE II RECOVERY - ADDTL 15 MIN: Performed by: SPECIALIST

## 2020-10-08 PROCEDURE — 2580000003 HC RX 258: Performed by: NURSE ANESTHETIST, CERTIFIED REGISTERED

## 2020-10-08 PROCEDURE — 6360000002 HC RX W HCPCS: Performed by: NURSE ANESTHETIST, CERTIFIED REGISTERED

## 2020-10-08 PROCEDURE — 2709999900 HC NON-CHARGEABLE SUPPLY: Performed by: SPECIALIST

## 2020-10-08 PROCEDURE — 3700000000 HC ANESTHESIA ATTENDED CARE: Performed by: SPECIALIST

## 2020-10-08 PROCEDURE — 3700000001 HC ADD 15 MINUTES (ANESTHESIA): Performed by: SPECIALIST

## 2020-10-08 PROCEDURE — U0002 COVID-19 LAB TEST NON-CDC: HCPCS

## 2020-10-08 PROCEDURE — 3600000004 HC SURGERY LEVEL 4 BASE: Performed by: SPECIALIST

## 2020-10-08 PROCEDURE — 7100000010 HC PHASE II RECOVERY - FIRST 15 MIN: Performed by: SPECIALIST

## 2020-10-08 PROCEDURE — 3600000014 HC SURGERY LEVEL 4 ADDTL 15MIN: Performed by: SPECIALIST

## 2020-10-08 RX ORDER — ONDANSETRON 2 MG/ML
INJECTION INTRAMUSCULAR; INTRAVENOUS PRN
Status: DISCONTINUED | OUTPATIENT
Start: 2020-10-08 | End: 2020-10-08 | Stop reason: SDUPTHER

## 2020-10-08 RX ORDER — FENTANYL CITRATE 50 UG/ML
25 INJECTION, SOLUTION INTRAMUSCULAR; INTRAVENOUS EVERY 5 MIN PRN
Status: DISCONTINUED | OUTPATIENT
Start: 2020-10-08 | End: 2020-10-08 | Stop reason: HOSPADM

## 2020-10-08 RX ORDER — DEXAMETHASONE SODIUM PHOSPHATE 4 MG/ML
INJECTION, SOLUTION INTRA-ARTICULAR; INTRALESIONAL; INTRAMUSCULAR; INTRAVENOUS; SOFT TISSUE PRN
Status: DISCONTINUED | OUTPATIENT
Start: 2020-10-08 | End: 2020-10-08 | Stop reason: SDUPTHER

## 2020-10-08 RX ORDER — SODIUM CHLORIDE, SODIUM LACTATE, POTASSIUM CHLORIDE, CALCIUM CHLORIDE 600; 310; 30; 20 MG/100ML; MG/100ML; MG/100ML; MG/100ML
INJECTION, SOLUTION INTRAVENOUS CONTINUOUS PRN
Status: DISCONTINUED | OUTPATIENT
Start: 2020-10-08 | End: 2020-10-08 | Stop reason: SDUPTHER

## 2020-10-08 RX ORDER — DIPHENHYDRAMINE HYDROCHLORIDE 50 MG/ML
12.5 INJECTION INTRAMUSCULAR; INTRAVENOUS
Status: DISCONTINUED | OUTPATIENT
Start: 2020-10-08 | End: 2020-10-08 | Stop reason: HOSPADM

## 2020-10-08 RX ORDER — ONDANSETRON 2 MG/ML
4 INJECTION INTRAMUSCULAR; INTRAVENOUS
Status: DISCONTINUED | OUTPATIENT
Start: 2020-10-08 | End: 2020-10-08 | Stop reason: HOSPADM

## 2020-10-08 RX ORDER — OXYCODONE HYDROCHLORIDE AND ACETAMINOPHEN 5; 325 MG/1; MG/1
2 TABLET ORAL PRN
Status: DISCONTINUED | OUTPATIENT
Start: 2020-10-08 | End: 2020-10-08 | Stop reason: HOSPADM

## 2020-10-08 RX ORDER — MEPERIDINE HYDROCHLORIDE 50 MG/ML
12.5 INJECTION INTRAMUSCULAR; INTRAVENOUS; SUBCUTANEOUS EVERY 5 MIN PRN
Status: DISCONTINUED | OUTPATIENT
Start: 2020-10-08 | End: 2020-10-08 | Stop reason: HOSPADM

## 2020-10-08 RX ORDER — LABETALOL HYDROCHLORIDE 5 MG/ML
5 INJECTION, SOLUTION INTRAVENOUS EVERY 10 MIN PRN
Status: DISCONTINUED | OUTPATIENT
Start: 2020-10-08 | End: 2020-10-08 | Stop reason: HOSPADM

## 2020-10-08 RX ORDER — DEXAMETHASONE SODIUM PHOSPHATE 4 MG/ML
4 INJECTION, SOLUTION INTRA-ARTICULAR; INTRALESIONAL; INTRAMUSCULAR; INTRAVENOUS; SOFT TISSUE
Status: DISCONTINUED | OUTPATIENT
Start: 2020-10-08 | End: 2020-10-08 | Stop reason: HOSPADM

## 2020-10-08 RX ORDER — FENTANYL CITRATE 50 UG/ML
INJECTION, SOLUTION INTRAMUSCULAR; INTRAVENOUS PRN
Status: DISCONTINUED | OUTPATIENT
Start: 2020-10-08 | End: 2020-10-08 | Stop reason: SDUPTHER

## 2020-10-08 RX ORDER — HYDRALAZINE HYDROCHLORIDE 20 MG/ML
5 INJECTION INTRAMUSCULAR; INTRAVENOUS EVERY 10 MIN PRN
Status: DISCONTINUED | OUTPATIENT
Start: 2020-10-08 | End: 2020-10-08 | Stop reason: HOSPADM

## 2020-10-08 RX ORDER — OXYCODONE HYDROCHLORIDE AND ACETAMINOPHEN 5; 325 MG/1; MG/1
1 TABLET ORAL PRN
Status: DISCONTINUED | OUTPATIENT
Start: 2020-10-08 | End: 2020-10-08 | Stop reason: HOSPADM

## 2020-10-08 RX ORDER — PROPOFOL 10 MG/ML
INJECTION, EMULSION INTRAVENOUS PRN
Status: DISCONTINUED | OUTPATIENT
Start: 2020-10-08 | End: 2020-10-08 | Stop reason: SDUPTHER

## 2020-10-08 RX ORDER — CEFAZOLIN SODIUM 1 G/3ML
INJECTION, POWDER, FOR SOLUTION INTRAMUSCULAR; INTRAVENOUS PRN
Status: DISCONTINUED | OUTPATIENT
Start: 2020-10-08 | End: 2020-10-08 | Stop reason: SDUPTHER

## 2020-10-08 RX ORDER — LIDOCAINE HYDROCHLORIDE 10 MG/ML
INJECTION, SOLUTION EPIDURAL; INFILTRATION; INTRACAUDAL; PERINEURAL PRN
Status: DISCONTINUED | OUTPATIENT
Start: 2020-10-08 | End: 2020-10-08 | Stop reason: SDUPTHER

## 2020-10-08 RX ADMIN — ONDANSETRON 4 MG: 2 INJECTION, SOLUTION INTRAMUSCULAR; INTRAVENOUS at 19:04

## 2020-10-08 RX ADMIN — LIDOCAINE HYDROCHLORIDE 50 MG: 10 INJECTION, SOLUTION EPIDURAL; INFILTRATION; INTRACAUDAL; PERINEURAL at 18:49

## 2020-10-08 RX ADMIN — CEFAZOLIN 2000 MG: 1 INJECTION, POWDER, FOR SOLUTION INTRAMUSCULAR; INTRAVENOUS at 18:56

## 2020-10-08 RX ADMIN — PROPOFOL 200 MG: 10 INJECTION, EMULSION INTRAVENOUS at 18:49

## 2020-10-08 RX ADMIN — DEXAMETHASONE SODIUM PHOSPHATE 4 MG: 4 INJECTION, SOLUTION INTRAMUSCULAR; INTRAVENOUS at 19:00

## 2020-10-08 RX ADMIN — FENTANYL CITRATE 25 MCG: 50 INJECTION INTRAMUSCULAR; INTRAVENOUS at 18:46

## 2020-10-08 RX ADMIN — SODIUM CHLORIDE, POTASSIUM CHLORIDE, SODIUM LACTATE AND CALCIUM CHLORIDE: 600; 310; 30; 20 INJECTION, SOLUTION INTRAVENOUS at 18:42

## 2020-10-08 RX ADMIN — FENTANYL CITRATE 25 MCG: 50 INJECTION INTRAMUSCULAR; INTRAVENOUS at 18:48

## 2020-10-08 ASSESSMENT — PULMONARY FUNCTION TESTS
PIF_VALUE: 7
PIF_VALUE: 0
PIF_VALUE: 5
PIF_VALUE: 7
PIF_VALUE: 9
PIF_VALUE: 1
PIF_VALUE: 22
PIF_VALUE: 0
PIF_VALUE: 1
PIF_VALUE: 19
PIF_VALUE: 6
PIF_VALUE: 1
PIF_VALUE: 0
PIF_VALUE: 0
PIF_VALUE: 6
PIF_VALUE: 2
PIF_VALUE: 9
PIF_VALUE: 0
PIF_VALUE: 1
PIF_VALUE: 0
PIF_VALUE: 5
PIF_VALUE: 2
PIF_VALUE: 6
PIF_VALUE: 10
PIF_VALUE: 25
PIF_VALUE: 2
PIF_VALUE: 19
PIF_VALUE: 1
PIF_VALUE: 6

## 2020-10-08 ASSESSMENT — PAIN - FUNCTIONAL ASSESSMENT: PAIN_FUNCTIONAL_ASSESSMENT: 0-10

## 2020-10-08 ASSESSMENT — PAIN SCALES - GENERAL
PAINLEVEL_OUTOF10: 0

## 2020-10-08 NOTE — H&P
Update History & Physical    The patient's History and Physical of September 23, 2020 was reviewed with the patient and I examined the patient. There was no change. The surgical site was confirmed by the patient and me. Plan: The risks, benefits, expected outcome, and alternative to the recommended procedure have been discussed with the patient. Patient understands and wants to proceed with the procedure. Electronically signed by Claudean Schmid, MD on 10/8/2020 at 4:01 PM          Geronimo Palma. Venkat Kahn MD [facility]     Urology Office Progress Note     Patient:  Jigna Escobar  YOB: 1950  Date: 9/23/2020     HISTORY OF PRESENT ILLNESS:   The patient is a 79 y.o. male who was last seen on 3/18/20. Prostate Cancer  Patient is here today for prostate cancer which was first diagnosed several years ago. His last several PSA values are as follows:        Lab Results   Component Value Date     PSA <0.01 09/16/2020     PSA <0.01 06/29/2020     PSA <0.01 03/12/2020      Previous treatment of prostate cancer: see below   Lower urinary tract symptoms: urgency and frequency   Associated Symptoms: No dysuria, gross hematuria. Pain Severity: Pain Score:   0 - No pain/10     Summary of old records:   Prostate cancer T3a No G3+4=7: 11/10/18 RALP and XRT 6/5/19, Lupron-last done Oct 2019  Gross hematuria, non-smoker: 3/26/20 KUB U/S: R renal cysts, declined cysto  Right renal cysts on 3/26/20 renal U/S  Urgency and frequency, Urge urinary incontinence: 2 ppd; samples of Myrbetriq 50 mg po qd (told to check BP daily)     Additional History: No evidence of prostate cancer recurrence s/p 11/10/18 Robotic assisted laparoscopic radical prostatectomy and XRT completed 6/5/19. Patient has occasional gross hematuria when his bladder is overdistended (radiation cystitis?). He has Right renal cysts on 3/26/20 renal U/S of no clinical significance.   He was told to take Myrbetriq 50 mg po qd instead of prn for OAB symptoms.      Procedures Today: N/A     Urinalysis today:        Results for POC orders placed in visit on 09/23/20   POCT Urinalysis No Micro (Auto)   Result Value Ref Range     Color, UA yellow       Clarity, UA clear       Glucose, UA POC negative       Bilirubin, UA         Ketones, UA         Spec Grav, UA         Blood, UA POC negative       pH, UA         Protein, UA POC negative       Urobilinogen, UA         Leukocytes, UA negative       Nitrite, UA negative        Last several PSA's:        Lab Results   Component Value Date     PSA <0.01 09/16/2020     PSA <0.01 06/29/2020     PSA <0.01 03/12/2020      Last total testosterone:        Lab Results   Component Value Date     TESTOSTERONE 189 (L) 12/18/2019         AUA Symptom Score (9/23/2020):  INCOMPLETE EMPTYING: How often have you had the sensation of not emptying your bladder?: Not at all  FREQUENCY: How often do you have to urinate less than every two hours?: More than Half the time  INTERMITTENCY: How often have you found you stopped and started again several times when you urinated?: Not at all  URGENCY: How often have you found it difficult to postpone urination?: About Half the time  WEAK STREAM: How often have you had a weak urinary stream?: Not at all  STRAINING: How often have you had to strain to start  urination?: Less than 1 to 5 times  NOCTURIA: How many times did you typically get up at night to uriniate?: 2 Times  TOTAL I-PSS SCORE[de-identified] 10  How would you feel if you were to spend the rest of your life with your urinary condition?: Mixe     Last AUA Symptom Score (QOL): 15 (4)     Last BUN and creatinine:        Lab Results   Component Value Date     BUN 17 02/26/2020            Lab Results   Component Value Date     CREATININE 0.89 02/26/2020         Additional Lab/Culture results: none     Imaging Reviewed during this Office Visit:   3/26/20 Renal U/S:  Impression    Right renal cyst, otherwise unremarkable exam.               (results were independently reviewed by physician and radiology report verified)     PAST MEDICAL, FAMILY AND SOCIAL HISTORY UPDATE:  Past Medical History        Past Medical History:   Diagnosis Date    Abdominal pain 11/14/2018    Arthritis      Back pain      Benign essential HTN 6/1/2018    Caffeine use       2 coffee/day    Cough 11/15/2018    Elevated CPK 4/5/2013    Elevated PSA measurement 3/23/2018    Frequent PVCs 6/1/2018    Gout      Hyperlipidemia 11/23/2018    Hypertension      Leukocytosis 11/15/2018    Need for prophylactic vaccination and inoculation against varicella 6/1/2018    Osteoarthritis      Osteoarthrosis 11/23/2018    Primary insomnia      Prostate cancer [facility] 11/4/2018    Rising PSA level 5/10/2018    Sleep apnea          Past Surgical History         Past Surgical History:   Procedure Laterality Date    CARPAL TUNNEL RELEASE        COLONOSCOPY N/A 11/6/2019     COLONOSCOPY DIAGNOSTIC performed by Israel House MD at [facility]    [facility]   2012    AK LAP,PROSTATECTOMY,RADICAL,W/NERVE SPARE,INCL ROBOTIC N/A 11/9/2018     ROBOTIC RADICAL PROSTATECTOMY WITH PELVIC LYMPH NODES DISSECTION performed by Audrey Almaguer MD at [place] [address]        UPPER GASTROINTESTINAL ENDOSCOPY   11/6/2019     EGD BIOPSY performed by Israel House MD at [facility]        Family History         Family History   Problem Relation Age of Onset    Prostate Cancer Father 71    Kidney Cancer Father           62s     Cancer Maternal Aunt           d. 48 unknown cancer     Prostate Cancer Paternal Grandfather 76        Current Facility-Administered Medications          Current Outpatient Medications   Medication Sig Dispense Refill    MYRBETRIQ 50 MG TB24 Take 50 mg by mouth daily 90 tablet 3    zolpidem (AMBIEN) 5 MG tablet TAKE ONE AND ONE-HALF TABLETS BY MOUTH NIGHTLY AS NEEDED FOR SLEEP 135 tablet 1    nitroGLYCERIN (NITROSTAT) 0.4 MG SL tablet Place 1 tablet under the tongue every 5 minutes as needed for Chest pain 90 tablet 1    cloNIDine (CATAPRES) 0.1 MG tablet TAKE 1 TABLET BY MOUTH ONE TIME A DAY AS NEEDED FOR HIGH BLOOD PRESSURE 90 tablet 3    lisinopril (PRINIVIL;ZESTRIL) 10 MG tablet Takes 1 pill in the am and 2 pills in the  tablet 3    aspirin 81 MG tablet Take 81 mg by mouth daily        flecainide (TAMBOCOR) 100 MG tablet Take 100 mg by mouth 2 times daily        amLODIPine (NORVASC) 5 MG tablet Take 2 tablets by mouth daily 180 tablet 2    rosuvastatin (CRESTOR) 10 MG tablet Take 1 tablet by mouth daily 90 tablet 2    allopurinol (ZYLOPRIM) 300 MG tablet Take 3/4 tablet daily 90 tablet 2    meloxicam (MOBIC) 7.5 MG tablet Take 1 tablet by mouth 2 times daily 180 tablet 2    dronedarone hcl (MULTAQ) 400 MG TABS Take 1 tablet by mouth 2 times daily (with meals) 60 tablet 3    pantoprazole (PROTONIX) 40 MG tablet Take 1 tablet by mouth every morning (before breakfast) 60 tablet 0    Multiple Vitamin (MULTI-VITAMIN) TABS Take 1 tablet by mouth          No current facility-administered medications for this visit.            Phenothiazines; Sulfa antibiotics; and Prochlorperazine edisylate  Social History          Tobacco Use   Smoking Status Never Smoker   Smokeless Tobacco Never Used      (If patient a smoker, smoking cessation counseling offered)    Social History           Substance and Sexual Activity   Alcohol Use Yes    Alcohol/week: 2.0 standard drinks    Types: 2 Cans of beer per week         REVIEW OF SYSTEMS (obtained by ancillary medical staff):  Level 2-3  Constitutional: negative  Level 4  Respiratory: negative  Level 5  Eyes: negative  Neurological: negative  Gastrointestinal: negative  Cardiovascular: negative  Skin: negative   Musculoskeletal: negative  Ears/Nose/Throat: negative  Psychological: negative     Physical Exam:           Vitals:     09/23/20 1351 BP: (!) 143/86   Pulse: 69      Body mass index is 35.87 kg/m². Patient is a 79 y.o. male in no acute distress and alert and oriented to person, place and time.        Assessment and Plan   1. Urge incontinence    2. Renal cyst, right    3. Gross hematuria    4. History of prostate cancer          Plan:   Return in about 6 months (around 3/23/2021) for PSA. No evidence of prostate cancer recurrence s/p 11/10/18 Robotic assisted laparoscopic radical prostatectomy and XRT completed 6/5/19. Patient has occasional gross hematuria when his bladder is overdistended (radiation cystitis?). He has Right renal cysts on 3/26/20 renal U/S of no clinical significance. He was told to take Myrbetriq 50 mg po qd instead of prn for OAB symptoms.            Ángel . Gerard Mckenzie MD FACS  9/23/2020 3:58 PM     2020 Quality Measure Documentation:   Patient referred to PCP to discuss exercise and nutrition counseling for high BMI.

## 2020-10-08 NOTE — OP NOTE
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 Cystourethroscopy  2. Fulgeration of bleeding areas at bladder neck and proximal urethra using Bugbee electrode  ANESTHESIA:  Gen ET  COMPLICATIONS:  None. DRAINS:  None.   SPECIMEN:  None   ESTIMATED BLOOD LOSS:  Less than 5 mL.      INDICATIONS FOR THE PROCEDURE:  Davion Ortiz is a 79 y.o. male presents with a history of gross hematuria, recent clot passage and history of prostate cancer s/p Robotic assisted laparoscopic radical prostatectomy and adjuvant radiation therapy. The patient follows up today to determine the etiology. The risks and benefits of the procedure, as well as possible alternatives and complications were discussed and he consented. The patient received cefazolin 2 gm IV on call to the OR.      DETAILS OF THE PROCEDURE:  The patient was correctly identified in the preoperative holding area. he  was brought back to the operating room and placed in the dorsal lithotomy  position. Anesthesia was administered; antibiotics administered by Anesthesia. EPC cuffs  were on and functional. Patient was then prepped and draped in the usual sterile fashion. Once an appropriate time out had been performed, with all parties consenting, a 25 French cystoscope with a 30-degree lens was placed through the urethra into the bladder. A thorough and complete cystoscopy was performed. Abnormalities include: several hypervascular areas within the bladder and mild bleeding at bladder neck and within proximal urethra. The bladder neck and proximal urethra was fulgurated with a bugbee electrode. Hemostasis was achieved.  The bladder was drained and the cystoscope was removed.      The patient tolerated the procedure well and was sent to the PACU for  postoperative monitoring.      DISPOSITION:  The patient was discharged home in stable condition with instructions to  follow up in clinic for pathology results

## 2020-10-08 NOTE — LETTER
Brayden Wilson MD [address] [facility], [address] [place] [address],[address]  [place], [facility]  P: [phone] / F: [phone]    Brief Postoperative Note  Surgical Facility: [address] [place] [address]   10/8/20    Nanda Murillo  [id number]  1950    Dear Young Hussein PA-C,    I've enclosed a Brief Op note on a procedure performed on your patient, Nanda Murillo today. Pre-operative Diagnosis: gross hematuria and clot passage  Post-operative Diagnosis: same due to radiation induced changes in bladder and proximal urethra    Procedure: Cystoscopy and fulgeration of bladder neck and proximal urethra    Anesthesia: General    Surgeon: Magdalene Cox; Resident: none    Findings: Hypervascularity within bladder and proximal urethra consistent with radiation induced changes. Plan: Discharge home with follow up in 3-4 weeks. Thank you for allowing me to participate in the care of this patient. I will keep you updated on this patient's follow up and I look forward to serving you and your patients again in the future.         Electronically signed by Thomas Francois MD, FACS

## 2020-10-08 NOTE — ANESTHESIA PRE PROCEDURE
Department of Anesthesiology  Preprocedure Note       Name:  Munir Stewart   Age:  79 y.o.  :  1950                                          MRN:  [id number]         Date:  10/8/2020      Surgeon: Apollo Luther):  Roseanne Tellez MD    Procedure: Procedure(s):  CYSTOSCOPY, BLADDER BIOPSY FULGARATION    Medications prior to admission:   Prior to Admission medications    Medication Sig Start Date End Date Taking?  Authorizing Provider   MYRBETRIQ 50 MG TB24 Take 50 mg by mouth daily 20   Roseanne Tellez MD   zolpidem (AMBIEN) 5 MG tablet TAKE ONE AND ONE-HALF TABLETS BY MOUTH NIGHTLY AS NEEDED FOR SLEEP 9/9/20 3/8/21  Munira Holding, PA-C   nitroGLYCERIN (NITROSTAT) 0.4 MG SL tablet Place 1 tablet under the tongue every 5 minutes as needed for Chest pain 20   Munira Holding, PA-C   cloNIDine (CATAPRES) 0.1 MG tablet TAKE 1 TABLET BY MOUTH ONE TIME A DAY AS NEEDED FOR HIGH BLOOD PRESSURE 3/13/20   Munira Holding, PA-C   lisinopril (PRINIVIL;ZESTRIL) 10 MG tablet Takes 1 pill in the am and 2 pills in the PM 3/13/20   Munira Holding, PA-C   aspirin 81 MG tablet Take 81 mg by mouth daily    Historical Provider, MD   flecainide (TAMBOCOR) 100 MG tablet Take 100 mg by mouth 2 times daily    Historical Provider, MD   amLODIPine (NORVASC) 5 MG tablet Take 2 tablets by mouth daily 3/4/20   Munira Holding, PA-C   rosuvastatin (CRESTOR) 10 MG tablet Take 1 tablet by mouth daily 3/4/20   Munira Holding, PA-C   allopurinol (ZYLOPRIM) 300 MG tablet Take 3/4 tablet daily 3/4/20   Munira Holding, PA-C   meloxicam (MOBIC) 7.5 MG tablet Take 1 tablet by mouth 2 times daily 3/4/20   Munira Holding, PA-C   dronedarone hcl (MULTAQ) 400 MG TABS Take 1 tablet by mouth 2 times daily (with meals) 3/29/20   Ventura Batista MD   pantoprazole (PROTONIX) 40 MG tablet Take 1 tablet by mouth every morning (before breakfast) 19   Keshav Wilson MD   Multiple Vitamin (MULTI-VITAMIN) TABS Take 1 tablet by mouth    Historical Provider, MD       Current medications:    No current facility-administered medications for this encounter. Allergies:     Allergies   Allergen Reactions    Phenothiazines Other (See Comments)     Neuromotor reaction called a phenothiazine reaction    Sulfa Antibiotics Other (See Comments)    Prochlorperazine Edisylate Other (See Comments)     Lose control       Problem List:    Patient Active Problem List   Diagnosis Code    Asymptomatic premature ventricular contractions I49.3    Primary insomnia F51.01    Prostate cancer ([facility] [address] C61    Gout M10.9    Hyperlipidemia E78.5    Hypertension I10    Osteoarthrosis M19.90    Atrial flutter ([address] I48.92    Hypokalemia E87.6    Atrial flutter with rapid ventricular response (HCC) I48.92    Frequency of micturition R35.0    Urge incontinence N39.41    History of prostate cancer Z85.46    Gross hematuria R31.0    Renal cyst, right N28.1       Past Medical History:        Diagnosis Date    Abdominal pain 11/14/2018    Arthritis     Back pain     Benign essential HTN 6/1/2018    Caffeine use     2 coffee/day    Cough 11/15/2018    Elevated CPK 4/5/2013    Elevated PSA measurement 3/23/2018    Frequent PVCs 6/1/2018    Gout     Hyperlipidemia 11/23/2018    Hypertension     Leukocytosis 11/15/2018    Need for prophylactic vaccination and inoculation against varicella 6/1/2018    Osteoarthritis     Osteoarthrosis 11/23/2018    Primary insomnia     Prostate cancer ([facility] [address] 11/4/2018    Rising PSA level 5/10/2018    Sleep apnea        Past Surgical History:        Procedure Laterality Date    CARPAL TUNNEL RELEASE      COLONOSCOPY N/A 11/6/2019    COLONOSCOPY DIAGNOSTIC performed by Luiz Hernandez MD at [facility]    . [facility]  2012    MA LAP,PROSTATECTOMY,RADICAL,W/NERVE SPARE,INCL ROBOTIC N/A 11/9/2018    ROBOTIC RADICAL PROSTATECTOMY WITH PELVIC LYMPH NODES DISSECTION performed by Imani Vera MD at [facility] GASTROINTESTINAL ENDOSCOPY  11/6/2019    EGD BIOPSY performed by Darby Restrepo MD at [facility]       Social History:    Social History     Tobacco Use    Smoking status: Never Smoker    Smokeless tobacco: Never Used   Substance Use Topics    Alcohol use: Yes     Alcohol/week: 2.0 standard drinks     Types: 2 Cans of beer per week                                Counseling given: Not Answered      Vital Signs (Current): There were no vitals filed for this visit. BP Readings from Last 3 Encounters:   09/23/20 (!) 143/86   03/18/20 135/86   03/04/20 120/77       NPO Status:                                                                                 BMI:   Wt Readings from Last 3 Encounters:   09/23/20 229 lb (103.9 kg)   03/18/20 229 lb 4.5 oz (104 kg)   03/04/20 231 lb (104.8 kg)     There is no height or weight on file to calculate BMI.    CBC:   Lab Results   Component Value Date    WBC 5.7 02/27/2020    RBC 3.90 02/27/2020    HGB 12.3 02/27/2020    HCT 37.6 02/27/2020    MCV 96.4 02/27/2020    RDW 13.8 02/27/2020     02/27/2020       CMP:   Lab Results   Component Value Date     02/26/2020    K 4.0 02/26/2020    K 3.5 11/17/2018     02/26/2020    CO2 18 02/26/2020    BUN 17 02/26/2020    CREATININE 0.89 02/26/2020    GFRAA >60 02/26/2020    LABGLOM >60 02/26/2020    LABGLOM 84 11/11/2018    GLUCOSE 92 02/26/2020    PROT 6.4 02/26/2020    CALCIUM 9.1 02/26/2020    BILITOT 0.29 02/26/2020    ALKPHOS 72 02/26/2020    AST 24 02/26/2020    ALT 19 02/26/2020       POC Tests: No results for input(s): POCGLU, POCNA, POCK, POCCL, POCBUN, POCHEMO, POCHCT in the last 72 hours.     Coags:   Lab Results   Component Value Date    PROTIME 9.9 02/26/2020    INR 0.9 02/26/2020    APTT 29.4 02/25/2020       HCG (If Applicable): No results found for: PREGTESTUR, PREGSERUM, HCG, HCGQUANT     ABGs: No results found for: PHART, PO2ART, LAH1HHS, VCG6IWR, BEART, G8RGWMNU     Type & Screen (If Applicable):  Lab Results   Component Value Date    LABRH POS 11/09/2018       Drug/Infectious Status (If Applicable):  Lab Results   Component Value Date    HEPCAB NONREACTIVE 06/01/2018       COVID-19 Screening (If Applicable):   Lab Results   Component Value Date    COVID19 Not Detected 10/08/2020         Anesthesia Evaluation  Patient summary reviewed and Nursing notes reviewed  Airway: Mallampati: II  TM distance: >3 FB   Neck ROM: full  Mouth opening: > = 3 FB Dental: normal exam         Pulmonary:Negative Pulmonary ROS and normal exam  breath sounds clear to auscultation                             Cardiovascular:Negative CV ROS  Exercise tolerance: no interval change,         ECG reviewed  Rhythm: regular  Rate: normal                    Neuro/Psych:   Negative Neuro/Psych ROS              GI/Hepatic/Renal: Neg GI/Hepatic/Renal ROS            Endo/Other: Negative Endo/Other ROS                    Abdominal:       Abdomen: soft. Vascular:                                        Anesthesia Plan      general     ASA 3       Induction: intravenous. MIPS: Postoperative opioids intended and Prophylactic antiemetics administered. Anesthetic plan and risks discussed with patient. Use of blood products discussed with patient whom consented to blood products. Plan discussed with attending and CRNA.     Attending anesthesiologist reviewed and agrees with Sueanne Blizzard, MD   10/8/2020

## 2020-10-09 NOTE — ANESTHESIA POSTPROCEDURE EVALUATION
Department of Anesthesiology  Postprocedure Note    Patient: Kamaljit Rodriguez  MRN: [id number]  YOB: 1950  Date of evaluation: 10/8/2020  Time:  8:47 PM     Procedure Summary     Date:  10/08/20 Room / Location:  [address]    Anesthesia Start:  [address] Anesthesia Stop:  1915    Procedure:  CYSTOSCOPY, BLADDER BIOPSY Viviane Newelles (N/A ) Diagnosis:  (GROSS HEMATURIA)    Surgeon:  Desi Kennedy MD Responsible Provider:  Ita Lobo MD    Anesthesia Type:  general ASA Status:  3          Anesthesia Type: general    Maggy Phase I: Maggy Score: 10    Maggy Phase II: Maggy Score: 10    Last vitals: Reviewed and per EMR flowsheets.        Anesthesia Post Evaluation    Patient location during evaluation: PACU  Patient participation: complete - patient participated  Level of consciousness: awake  Pain score: 1  Airway patency: patent  Nausea & Vomiting: no nausea and no vomiting  Complications: no  Cardiovascular status: blood pressure returned to baseline  Respiratory status: acceptable  Hydration status: euvolemic

## 2020-10-15 ENCOUNTER — HOSPITAL ENCOUNTER (OUTPATIENT)
Age: 70
Discharge: HOME | End: 2020-10-15
Payer: COMMERCIAL

## 2020-10-15 ENCOUNTER — OFFICE VISIT (OUTPATIENT)
Dept: PRIMARY CARE CLINIC | Age: 70
End: 2020-10-15
Payer: COMMERCIAL

## 2020-10-15 VITALS
HEART RATE: 56 BPM | SYSTOLIC BLOOD PRESSURE: 132 MMHG | OXYGEN SATURATION: 96 % | DIASTOLIC BLOOD PRESSURE: 86 MMHG | BODY MASS INDEX: 36.42 KG/M2 | WEIGHT: 236 LBS | TEMPERATURE: 98.5 F

## 2020-10-15 LAB
-: NORMAL
ALBUMIN SERPL-MCNC: 4.5 G/DL (ref 3.5–5.2)
ALBUMIN/GLOBULIN RATIO: 1.6 (ref 1–2.5)
ALP BLD-CCNC: 79 U/L (ref 40–129)
ALT SERPL-CCNC: 17 U/L (ref 5–41)
AMORPHOUS: NORMAL
ANION GAP SERPL CALCULATED.3IONS-SCNC: 13 MMOL/L (ref 9–17)
AST SERPL-CCNC: 20 U/L
BACTERIA: NORMAL
BILIRUB SERPL-MCNC: 0.47 MG/DL (ref 0.3–1.2)
BILIRUBIN URINE: NEGATIVE
BUN BLDV-MCNC: 23 MG/DL (ref 8–23)
BUN/CREAT BLD: ABNORMAL (ref 9–20)
CALCIUM SERPL-MCNC: 9.4 MG/DL (ref 8.6–10.4)
CASTS UA: NORMAL /LPF (ref 0–8)
CHLORIDE BLD-SCNC: 105 MMOL/L (ref 98–107)
CHOLESTEROL/HDL RATIO: 3.5
CHOLESTEROL: 173 MG/DL
CO2: 22 MMOL/L (ref 20–31)
COLOR: YELLOW
COMMENT UA: ABNORMAL
CREAT SERPL-MCNC: 1.01 MG/DL (ref 0.7–1.2)
CRYSTALS, UA: NORMAL /HPF
EPITHELIAL CELLS UA: NORMAL /HPF (ref 0–5)
GFR AFRICAN AMERICAN: >60 ML/MIN
GFR NON-AFRICAN AMERICAN: >60 ML/MIN
GFR SERPL CREATININE-BSD FRML MDRD: ABNORMAL ML/MIN/{1.73_M2}
GFR SERPL CREATININE-BSD FRML MDRD: ABNORMAL ML/MIN/{1.73_M2}
GLUCOSE BLD-MCNC: 102 MG/DL (ref 70–99)
GLUCOSE URINE: NEGATIVE
HCT VFR BLD CALC: 42.7 % (ref 40.7–50.3)
HDLC SERPL-MCNC: 50 MG/DL
HEMOGLOBIN: 13.9 G/DL (ref 13–17)
KETONES, URINE: NEGATIVE
LDL CHOLESTEROL: 96 MG/DL (ref 0–130)
LEUKOCYTE ESTERASE, URINE: NEGATIVE
MCH RBC QN AUTO: 31.5 PG (ref 25.2–33.5)
MCHC RBC AUTO-ENTMCNC: 32.6 G/DL (ref 28.4–34.8)
MCV RBC AUTO: 96.8 FL (ref 82.6–102.9)
MUCUS: NORMAL
NITRITE, URINE: NEGATIVE
NRBC AUTOMATED: 0 PER 100 WBC
OTHER OBSERVATIONS UA: NORMAL
PDW BLD-RTO: 13.4 % (ref 11.8–14.4)
PH UA: 5.5 (ref 5–8)
PLATELET # BLD: 246 K/UL (ref 138–453)
PMV BLD AUTO: 9.7 FL (ref 8.1–13.5)
POTASSIUM SERPL-SCNC: 4.2 MMOL/L (ref 3.7–5.3)
PROTEIN UA: NEGATIVE
RBC # BLD: 4.41 M/UL (ref 4.21–5.77)
RBC UA: NORMAL /HPF (ref 0–4)
RENAL EPITHELIAL, UA: NORMAL /HPF
SODIUM BLD-SCNC: 140 MMOL/L (ref 135–144)
SPECIFIC GRAVITY UA: 1.01 (ref 1–1.03)
TOTAL PROTEIN: 7.3 G/DL (ref 6.4–8.3)
TRICHOMONAS: NORMAL
TRIGL SERPL-MCNC: 136 MG/DL
TURBIDITY: CLEAR
URIC ACID: 5.4 MG/DL (ref 3.4–7)
URINE HGB: ABNORMAL
UROBILINOGEN, URINE: NORMAL
VLDLC SERPL CALC-MCNC: NORMAL MG/DL (ref 1–30)
WBC # BLD: 4 K/UL (ref 3.5–11.3)
WBC UA: NORMAL /HPF (ref 0–5)
YEAST: NORMAL

## 2020-10-15 PROCEDURE — 81001 URINALYSIS AUTO W/SCOPE: CPT

## 2020-10-15 PROCEDURE — 90694 VACC AIIV4 NO PRSRV 0.5ML IM: CPT | Performed by: PHYSICIAN ASSISTANT

## 2020-10-15 PROCEDURE — 80053 COMPREHEN METABOLIC PANEL: CPT

## 2020-10-15 PROCEDURE — 80061 LIPID PANEL: CPT

## 2020-10-15 PROCEDURE — 85027 COMPLETE CBC AUTOMATED: CPT

## 2020-10-15 PROCEDURE — 84550 ASSAY OF BLOOD/URIC ACID: CPT

## 2020-10-15 PROCEDURE — 90471 IMMUNIZATION ADMIN: CPT | Performed by: PHYSICIAN ASSISTANT

## 2020-10-15 PROCEDURE — 36415 COLL VENOUS BLD VENIPUNCTURE: CPT

## 2020-10-15 PROCEDURE — 99397 PER PM REEVAL EST PAT 65+ YR: CPT | Performed by: PHYSICIAN ASSISTANT

## 2020-10-15 RX ORDER — MELOXICAM 7.5 MG/1
7.5 TABLET ORAL 2 TIMES DAILY
COMMUNITY
End: 2020-11-30

## 2020-10-15 RX ORDER — OXYBUTYNIN CHLORIDE 10 MG/1
10 TABLET, EXTENDED RELEASE ORAL DAILY
COMMUNITY
End: 2020-10-15 | Stop reason: SDUPTHER

## 2020-10-15 RX ORDER — OXYBUTYNIN CHLORIDE 10 MG/1
10 TABLET, EXTENDED RELEASE ORAL DAILY
Qty: 30 TABLET | Refills: 0 | Status: SHIPPED | OUTPATIENT
Start: 2020-10-15 | End: 2020-12-09

## 2020-10-15 ASSESSMENT — ENCOUNTER SYMPTOMS: BLOOD IN STOOL: 1

## 2020-10-15 NOTE — PROGRESS NOTES
[facility]  [id number] [facility] [address]  Dept: [phone]  Dept Fax: [phone]    Office Progress/Follow Up Note    Date of patient's visit: 10/15/2020    Patient's Name:  Sarah Scott YOB: 1950            Patient Care Team:  Carolina Galdamez PA-C as PCP - General (Physician Assistant)  Carolina Galdamez PA-C as PCP - [facility]  Megan Kasper MD as Consulting Physician (Infectious Diseases)  Chioma Sauer RN as Nurse Navigator (Oncology)  Roseanne López MD as Consulting Physician (Urology)  Josias Naylor MD as Consulting Physician (Cardiology)  ================================================================    REASON FOR VISIT/CHIEF COMPLAINT:  6 Month Follow-Up (HTN, burning when urinating. )    HISTORY OF PRESENTING ILLNESS:  History was obtained from: patient. Sarah Scott is a 79 y.o. is here for physical, follow-up for urge incontinence. Patient states he is compliant with medications. Patient is status post cystoscopy on 10/8/2020, tolerated well, will be following up with urology on 11/4/2020. Patient is requesting UA to evaluate recent dysuria, UA ordered. Patient requests medication refill, Ditropan, urology is the prescriber, informed patient will refill and follow-up with urology. Patient states he will be seen by cardiology \"in December\". Patient blood pressures controlled in office. Patient has gained 5 pounds. Discussed weight loss in depth with patient, encourage patient make changes in diet and the importance of physical activity by exercising multiple times weekly. Labs reviewed, informed patient labs will be ordered and to have completed before follow-up appointment, patient voiced understanding.     Health maintenance reviewed, discussed influenza vaccination in depth with patient, patient consented influenza vaccine administered in office. Medications reviewed, refilled Ditropan XL 10 mg. HPI    Patient Active Problem List   Diagnosis    Asymptomatic premature ventricular contractions    Primary insomnia    Prostate cancer ([address]    Gout    Hyperlipidemia    Hypertension    Osteoarthrosis    Atrial flutter (HCC)    Hypokalemia    Atrial flutter with rapid ventricular response (HCC)    Frequency of micturition    Urge incontinence    History of prostate cancer    Gross hematuria    Renal cyst, right    Radiation cystitis       There are no preventive care reminders to display for this patient.     Allergies   Allergen Reactions    Phenothiazines Other (See Comments)     Neuromotor reaction called a phenothiazine reaction    Sulfa Antibiotics Other (See Comments)    Prochlorperazine Edisylate Other (See Comments)     Lose control         Current Outpatient Medications   Medication Sig Dispense Refill    meloxicam (MOBIC) 7.5 MG tablet Take 7.5 mg by mouth 2 times daily      oxybutynin (DITROPAN-XL) 10 MG extended release tablet Take 1 tablet by mouth daily 30 tablet 0    zolpidem (AMBIEN) 5 MG tablet TAKE ONE AND ONE-HALF TABLETS BY MOUTH NIGHTLY AS NEEDED FOR SLEEP 135 tablet 1    nitroGLYCERIN (NITROSTAT) 0.4 MG SL tablet Place 1 tablet under the tongue every 5 minutes as needed for Chest pain 90 tablet 1    cloNIDine (CATAPRES) 0.1 MG tablet TAKE 1 TABLET BY MOUTH ONE TIME A DAY AS NEEDED FOR HIGH BLOOD PRESSURE 90 tablet 3    lisinopril (PRINIVIL;ZESTRIL) 10 MG tablet Takes 1 pill in the am and 2 pills in the PM (Patient taking differently: Take 15 mg by mouth 2 times daily Takes 1 pill in the am and 2 pills in the PM) 270 tablet 3    flecainide (TAMBOCOR) 100 MG tablet Take 100 mg by mouth 2 times daily      amLODIPine (NORVASC) 5 MG tablet Take 2 tablets by mouth daily 180 tablet 2    rosuvastatin (CRESTOR) 10 MG tablet Take 1 tablet by mouth daily 90 tablet 2    allopurinol (ZYLOPRIM) 300 Nose: Nose normal.      Mouth/Throat:      Pharynx: Uvula midline. Eyes:      General: Lids are normal.      Conjunctiva/sclera: Conjunctivae normal.      Pupils: Pupils are equal, round, and reactive to light. Cardiovascular:      Rate and Rhythm: Normal rate and regular rhythm. Heart sounds: Normal heart sounds. Pulmonary:      Effort: Pulmonary effort is normal.      Breath sounds: Normal breath sounds. Abdominal:      Palpations: Abdomen is soft. Tenderness: There is no abdominal tenderness. Musculoskeletal: Normal range of motion. General: No tenderness or deformity. Skin:     General: Skin is warm and dry. Neurological:      Mental Status: He is alert and oriented to person, place, and time. Psychiatric:         Speech: Speech normal.         Behavior: Behavior normal. Behavior is cooperative. Thought Content: Thought content normal.         Judgment: Judgment normal.           DIAGNOSTIC FINDINGS:  CBC:  Lab Results   Component Value Date    WBC 4.0 10/15/2020    HGB 13.9 10/15/2020     10/15/2020       BMP:    Lab Results   Component Value Date     10/15/2020    K 4.2 10/15/2020    K 3.5 11/17/2018     10/15/2020    CO2 22 10/15/2020    BUN 23 10/15/2020    CREATININE 1.01 10/15/2020    GLUCOSE 102 10/15/2020       HEMOGLOBIN A1C:   Lab Results   Component Value Date    LABA1C 5.2 09/05/2019       FASTING LIPID PANEL:  Lab Results   Component Value Date    CHOL 173 10/15/2020    HDL 50 10/15/2020    TRIG 136 10/15/2020       ASSESSMENT AND PLAN:  Cici Pro was seen today for 6 month follow-up. Diagnoses and all orders for this visit:    Annual physical exam    Urge incontinence  -     oxybutynin (DITROPAN-XL) 10 MG extended release tablet; Take 1 tablet by mouth daily  -     Urinalysis Reflex to Culture;  Future    Hematuria, unspecified type    Class 2 severe obesity due to excess calories with serious comorbidity and body mass index (BMI) of 36.0 to 36.9 in adult [facility])    Hyperuricemia  -     Uric Acid; Future    Flu vaccine need  -     INFLUENZA, QUADV, ADJUVANTED, 65 YRS =, IM, PF, PREFILL SYR, 0.5ML (FLUAD)  -     ND IMMUNIZ ADMIN,1 SINGLE/COMB VAC/TOXOID    Healthcare maintenance  -     Lipid Panel; Future  -     Comprehensive Metabolic Panel; Future  -     CBC; Future      FOLLOW UP AND INSTRUCTIONS:  Return in about 6 months (around 4/15/2021) for Lab Review. · Yolanda Arrington received counseling on the following healthy behaviors:nutrition and exercise    · Discussed use, benefit, and side effects of prescribed medications. Barriers to medication compliance addressed. All patient questions answered. Pt voiced understanding. · Patient given educational materials - see patient instructions    Electronically signed by Janis Mattson PA-C on 10/15/20 at 12:50 PM EDT    This note is created with the assistance of a speech-recognition program. While intending to generate a document that actually reflects the content of the visit, the document can still have some mistakes which may not have been identified and corrected by editing.

## 2020-10-15 NOTE — PROGRESS NOTES
Visit Information    Have you changed or started any medications since your last visit including any over-the-counter medicines, vitamins, or herbal medicines? no   Are you having any side effects from any of your medications? -  no  Have you stopped taking any of your medications? Is so, why? -  no    Have you seen any other physician or provider since your last visit? No  Have you had any other diagnostic tests since your last visit? Yes - Records Obtained  Have you been seen in the emergency room and/or had an admission to a hospital since we last saw you? Yes - Records Obtained  Have you had your routine dental cleaning in the past 6 months? no    Have you activated your [employer] account? If not, what are your barriers?  Yes     Patient Care Team:  Topher Garcia PA-C as PCP - General (Physician Assistant)  Topher Garcia PA-C as PCP - [facility] Provider  Rigo Bradford MD as Consulting Physician (Infectious Diseases)  Katelyn Olmstead RN as Nurse Navigator (Oncology)  Marcelo Dean MD as Consulting Physician (Urology)  Branden Priest MD as Consulting Physician (Cardiology)    Medical History Review  Past Medical, Family, and Social History reviewed and does contribute to the patient presenting condition    Health Maintenance   Topic Date Due    Flu vaccine (1) 09/01/2020    Lipid screen  12/18/2020    Potassium monitoring  02/26/2021    Creatinine monitoring  02/26/2021    PSA counseling  09/16/2021    DTaP/Tdap/Td vaccine (2 - Td) 05/10/2027    Colon cancer screen colonoscopy  11/06/2029    Shingles Vaccine  Completed    Pneumococcal 65+ years Vaccine  Completed    Hepatitis C screen  Completed    Hepatitis A vaccine  Aged Out    Hepatitis B vaccine  Aged Out    Hib vaccine  Aged Out    Meningococcal (ACWY) vaccine  Aged Out

## 2020-10-15 NOTE — PATIENT INSTRUCTIONS
· Discuss other antismasmodics Manasa Dangelo and [employer] [place]) with urology  · Get labs done

## 2020-10-22 ASSESSMENT — ENCOUNTER SYMPTOMS
ABDOMINAL PAIN: 0
CONSTIPATION: 0
RHINORRHEA: 0
NAUSEA: 0
SHORTNESS OF BREATH: 0
WHEEZING: 0
BACK PAIN: 0
COUGH: 0
EYE PAIN: 0
VOMITING: 0
SORE THROAT: 0
DIARRHEA: 0

## 2020-10-28 ENCOUNTER — OFFICE VISIT (OUTPATIENT)
Dept: ORTHOPEDIC SURGERY | Age: 70
End: 2020-10-28
Payer: COMMERCIAL

## 2020-10-28 VITALS — TEMPERATURE: 97.5 F | BODY MASS INDEX: 34.1 KG/M2 | HEIGHT: 68 IN | RESPIRATION RATE: 14 BRPM | WEIGHT: 225 LBS

## 2020-10-28 PROCEDURE — 99213 OFFICE O/P EST LOW 20 MIN: CPT | Performed by: ORTHOPAEDIC SURGERY

## 2020-10-28 NOTE — PROGRESS NOTES
[facility]  [facility] Sienna Kerr  [address] [place] [address]  Dept: [phone]    Ambulatory Orthopedic Consult      CHIEF COMPLAINT:    Chief Complaint   Patient presents with    Ankle Pain     Left Ankle       HISTORY OF PRESENT ILLNESS:      The patient is a 79 y.o. male who is being seen for consultation and evaluation of pain at the ankle joint, located mainly anteriorly, which began about 20 years ago secondary to an injury running, he reports. The pain is a dull aching type of pain with occasional sharp stabbing character. The patient reports an associated swelling and stiffness. The pain is worse with activity and better with rest. The patient reports a progressive course. The patient has tried:      [x]  rest/activity modification          []  NSAIDs      []  opiates      []  orthotics        []  change in shoes    []  home exercises  []  physical therapy      []  CAM boot     []  brace:    []  injection:       []  surgery:      Notably, the injury that he described from 20 years ago was treated nonoperatively. INTERVAL HISTORY 10/28/2020:  He is seen again today in the office for follow up of a previous issue (as above). Since being seen last, the patient is doing about the same overall in terms of pain. He is ambulating today using an [place] brace. The location and quality of the pain have not significantly changed since the last visit. He is here today with his wife, who also provides some of the history. REVIEW OF SYSTEMS:  Constitutional: Negative for fever. HENT: Negative for tinnitus. Eyes: Negative for pain. Respiratory: Negative for shortness of breath. Cardiovascular: Negative for chest pain. Gastrointestinal: Negative for abdominal pain. Genitourinary: Negative for dysuria. Skin: Negative for rash. Neurological: Negative for headaches. Hematological: Does not bruise/bleed easily.    Musculoskeletal: See HPI for pertinent positives       Past Medical History:    Past Medical History:   Diagnosis Date    Abdominal pain 11/14/2018    Arthritis     Back pain     Benign essential HTN 6/1/2018    Caffeine use     2 coffee/day    Cough 11/15/2018    Elevated CPK 4/5/2013    Elevated PSA measurement 3/23/2018    Frequent PVCs 6/1/2018    Gout     Hyperlipidemia 11/23/2018    Hypertension     Leukocytosis 11/15/2018    Need for prophylactic vaccination and inoculation against varicella 6/1/2018    Osteoarthritis     Osteoarthrosis 11/23/2018    Primary insomnia     Prostate cancer ([address] 11/4/2018    Rising PSA level 5/10/2018    Sleep apnea        Past Surgical History:    Past Surgical History:   Procedure Laterality Date    CARPAL TUNNEL RELEASE      COLONOSCOPY N/A 11/6/2019    COLONOSCOPY DIAGNOSTIC performed by Parvez Soto MD at [facility]    CYSTOSCOPY  10/08/2020    bladder fulgration    CYSTOSCOPY W BIOPSY OF BLADDER N/A 10/8/2020    CYSTOSCOPY, BLADDER BIOPSY Tom Hinders performed by Roseanne López MD at [address]  2012    ND LAP,PROSTATECTOMY,RADICAL,W/NERVE [address] ROBOTIC N/A 11/9/2018    ROBOTIC RADICAL PROSTATECTOMY WITH PELVIC LYMPH NODES DISSECTION performed by Ananya Luna MD at [facility]      UPPER GASTROINTESTINAL ENDOSCOPY  11/6/2019    EGD BIOPSY performed by Parvez Soto MD at [facility]       Current Medications:   Current Outpatient Medications   Medication Sig Dispense Refill    meloxicam (MOBIC) 7.5 MG tablet Take 7.5 mg by mouth 2 times daily      oxybutynin (DITROPAN-XL) 10 MG extended release tablet Take 1 tablet by mouth daily 30 tablet 0    zolpidem (AMBIEN) 5 MG tablet TAKE ONE AND ONE-HALF TABLETS BY MOUTH NIGHTLY AS NEEDED FOR SLEEP 135 tablet 1    nitroGLYCERIN (NITROSTAT) 0.4 MG SL tablet Place 1 tablet under the tongue every 5 minutes as needed for Chest pain 90 tablet 1    cloNIDine (CATAPRES) 0.1 MG tablet TAKE 1 TABLET BY MOUTH ONE TIME A DAY AS NEEDED FOR HIGH BLOOD PRESSURE 90 tablet 3    lisinopril (PRINIVIL;ZESTRIL) 10 MG tablet Takes 1 pill in the am and 2 pills in the PM (Patient taking differently: Take 15 mg by mouth 2 times daily Takes 1 pill in the am and 2 pills in the PM) 270 tablet 3    flecainide (TAMBOCOR) 100 MG tablet Take 100 mg by mouth 2 times daily      amLODIPine (NORVASC) 5 MG tablet Take 2 tablets by mouth daily 180 tablet 2    rosuvastatin (CRESTOR) 10 MG tablet Take 1 tablet by mouth daily 90 tablet 2    allopurinol (ZYLOPRIM) 300 MG tablet Take 3/4 tablet daily 90 tablet 2    Multiple Vitamin (MULTI-VITAMIN) TABS Take 1 tablet by mouth       No current facility-administered medications for this visit. Allergies:    Phenothiazines; Sulfa antibiotics; and Prochlorperazine edisylate    Family History:  Family History   Problem Relation Age of Onset    Prostate Cancer Father 71    Kidney Cancer Father         62s     Cancer Maternal Aunt         d. 48 unknown cancer     Prostate Cancer Paternal Grandfather 76       Social History:   Social History     Socioeconomic History    Marital status:      Spouse name: Not on file    Number of children: Not on file    Years of education: Not on file    Highest education level: Not on file   Occupational History    Not on file   Social Needs    Financial resource strain: Not on file    Food insecurity     Worry: Not on file     Inability: Not on file    Transportation needs     Medical: Not on file     Non-medical: Not on file   Tobacco Use    Smoking status: Never Smoker    Smokeless tobacco: Never Used   Substance and Sexual Activity    Alcohol use:  Yes     Alcohol/week: 2.0 standard drinks     Types: 2 Cans of beer per week    Drug use: Never    Sexual activity: Not Currently   Lifestyle    Physical activity     Days per week: Not Able to fire/perform the following with appropriate strength:    [x]  Tib Ant:     [x]  Gastroc-Soleus:         [x]  Inversion:    [x]  Eversion:         [x]  FHL:     [x]  EHL:      Motion:  Normal for the following joints:    []  Ankle: Minimal     []  Subtalar: Minimal      [x]  1st MTP:      []  1st TMT:            Tenderness to Palpation:    Tenderness to palpation: Diffusely throughout the ankle and hindfoot      RADIOLOGY:   10/28/2020 FINDINGS:  Three weightbearing views (AP, Mortise, and Lateral) of the left ankle and three weightbearing views (AP, Oblique, Lateral) of the left foot were obtained in the office today and reviewed, revealing no acute fracture, dislocation, or radioopaque foreign body/tumor. Changes consistent with AVN of the talus with collapse and sclerosis, involving significant degenerative changes of the tibiotalar joint and subtalar joint with joint narrowing and sclerosis. Early degenerative changes of the talonavicular joint with sclerosis, joint narrowing, and osteophytes. 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 Electronically signed by Avril Turpin MD      ASSESSMENT AND PLAN:  Tony Keenan was seen today for Ankle Pain (Left Ankle)  The primary encounter diagnosis was Secondary localized osteoarthrosis of left ankle and foot. A diagnosis of Arthritis of subtalar joint was also pertinent to this visit. Body mass index is 34.72 kg/m². He has left tibiotalar and subtalar arthritis secondary to avascular necrosis of the talus (likely secondary to a talar neck fracture 20 years ago, treated conservatively), along with some mild/early degenerative changes of the talonavicular joint.      Notably, he has a history of hypertension, prostate cancer, insomnia, hyperlipidemia, gout, cardiac arrhythmias (history of atrial flutter with rapid ventricular response, and PVCs), a history of errors including those of syntax and sound-a-like substitutions which may escape proof reading. In such instances, actual meaning can be extrapolated by context.

## 2020-12-21 NOTE — TELEPHONE ENCOUNTER
Pt called and stated that he is having an issue with getting his mail order Rx's and stated that there is a delay in getting his medications due to the postal service being backed up. Pt is requesting a short term supply of pended medications to be sent to [facility] in [facility]. Pt would like a 1 moth supply.    Health Maintenance   Topic Date Due    PSA counseling  09/16/2021    Lipid screen  10/15/2021    Potassium monitoring  10/15/2021    Creatinine monitoring  10/15/2021    Diabetes screen  09/05/2022    DTaP/Tdap/Td vaccine (2 - Td) 05/10/2027    Colon cancer screen colonoscopy  11/06/2029    Flu vaccine  Completed    Shingles Vaccine  Completed    Pneumococcal 65+ years Vaccine  Completed    Hepatitis C screen  Completed    Hepatitis A vaccine  Aged Out    Hepatitis B vaccine  Aged Out    Hib vaccine  Aged Out    Meningococcal (ACWY) vaccine  Aged Out             (applicable per patient's age: Cancer Screenings, Depression Screening, Fall Risk Screening, Immunizations)    Hemoglobin A1C (%)   Date Value   09/05/2019 5.2   03/26/2019 5.7     LDL Cholesterol (mg/dL)   Date Value   10/15/2020 96     AST (U/L)   Date Value   10/15/2020 20     ALT (U/L)   Date Value   10/15/2020 17     BUN (mg/dL)   Date Value   10/15/2020 23      (goal A1C is < 7)   (goal LDL is <100) need 30-50% reduction from baseline     BP Readings from Last 3 Encounters:   10/15/20 132/86   10/08/20 136/83   10/08/20 138/73    (goal /80)      All Future Testing planned in [facility]:  Lab Frequency Next Occurrence   PSA, Diagnostic Once 03/22/2021   PSA, Diagnostic Once 02/02/2021   PSA, Diagnostic         Next Visit Date:  Future Appointments   Date Time Provider Carol Stephenson   3/24/2021  2:00 PM Kb Bañuelos MD [facility]   3/25/2021 11:20 AM Emre Barrera PA-C [facility]            Patient Active Problem List:     Asymptomatic premature ventricular contractions Primary insomnia     Prostate cancer (HCC)     Gout     Hyperlipidemia     Hypertension     Osteoarthrosis     Atrial flutter (HCC)     Hypokalemia     Atrial flutter with rapid ventricular response (HCC)     Frequency of micturition     Urge incontinence     History of prostate cancer     Gross hematuria     Renal cyst, right     Radiation cystitis

## 2020-12-22 RX ORDER — ZOLPIDEM TARTRATE 5 MG/1
TABLET ORAL
Qty: 135 TABLET | Refills: 1 | OUTPATIENT
Start: 2020-12-22 | End: 2021-06-20

## 2020-12-22 RX ORDER — ZOLPIDEM TARTRATE 5 MG/1
7.5 TABLET ORAL NIGHTLY PRN
Qty: 45 TABLET | Refills: 0 | OUTPATIENT
Start: 2020-12-22 | End: 2021-01-21

## 2020-12-22 RX ORDER — CLONIDINE HYDROCHLORIDE 0.1 MG/1
0.1 TABLET ORAL DAILY
Qty: 30 TABLET | Refills: 0 | Status: SHIPPED | OUTPATIENT
Start: 2020-12-22 | End: 2021-03-13

## 2020-12-22 RX ORDER — AMLODIPINE BESYLATE 5 MG/1
10 TABLET ORAL DAILY
Qty: 60 TABLET | Refills: 0 | Status: SHIPPED | OUTPATIENT
Start: 2020-12-22 | End: 2021-03-25 | Stop reason: SDUPTHER

## 2020-12-22 RX ORDER — LISINOPRIL 10 MG/1
TABLET ORAL
Qty: 90 TABLET | Refills: 0 | Status: SHIPPED | OUTPATIENT
Start: 2020-12-22 | End: 2021-03-25 | Stop reason: SDUPTHER

## 2020-12-22 RX ORDER — FLECAINIDE ACETATE 100 MG/1
100 TABLET ORAL 2 TIMES DAILY
Qty: 60 TABLET | OUTPATIENT
Start: 2020-12-22

## 2020-12-22 RX ORDER — ROSUVASTATIN CALCIUM 10 MG/1
TABLET, COATED ORAL
Qty: 90 TABLET | Refills: 1 | OUTPATIENT
Start: 2020-12-22

## 2020-12-22 RX ORDER — ROSUVASTATIN CALCIUM 10 MG/1
10 TABLET, COATED ORAL NIGHTLY
Qty: 30 TABLET | Refills: 0 | Status: SHIPPED | OUTPATIENT
Start: 2020-12-22 | End: 2021-03-25 | Stop reason: SDUPTHER

## 2020-12-23 ENCOUNTER — TELEPHONE (OUTPATIENT)
Dept: PRIMARY CARE CLINIC | Age: 70
End: 2020-12-23

## 2020-12-23 RX ORDER — ZOLPIDEM TARTRATE 5 MG/1
7.5 TABLET ORAL NIGHTLY PRN
Qty: 45 TABLET | Refills: 0 | Status: SHIPPED | OUTPATIENT
Start: 2020-12-23 | End: 2021-01-22

## 2020-12-23 RX ORDER — ZOLPIDEM TARTRATE 5 MG/1
TABLET ORAL
Qty: 7 TABLET | Refills: 1 | Status: SHIPPED | OUTPATIENT
Start: 2020-12-23 | End: 2020-12-23

## 2020-12-23 RX ORDER — FLECAINIDE ACETATE 100 MG/1
100 TABLET ORAL 2 TIMES DAILY
Qty: 60 TABLET | Refills: 0 | Status: SHIPPED | OUTPATIENT
Start: 2020-12-23 | End: 2022-03-07 | Stop reason: SDUPTHER

## 2020-12-23 NOTE — TELEPHONE ENCOUNTER
Pt came into office temp 1 mth  prescription for Flecainide 100 mg and Ambien 5 mg  called into [facility] due to delay in delivery from mail order pharmacy,pls advise

## 2020-12-23 NOTE — TELEPHONE ENCOUNTER
Pt called and stated that he spoke with [facility] and that there is a discrepancy with some of his medications. Pt would like to discuss with PCP. Medications in question is Flecainide and Zolpidem. Pt was informed that PCP is currently out of office until after the 1st of the year and my not be able to do anything until PCP is back in office.

## 2021-02-04 ENCOUNTER — HOSPITAL ENCOUNTER (INPATIENT)
Age: 71
LOS: 1 days | Discharge: HOME | DRG: 305 | End: 2021-02-05
Attending: EMERGENCY MEDICINE | Admitting: INTERNAL MEDICINE
Payer: COMMERCIAL

## 2021-02-04 ENCOUNTER — TELEPHONE (OUTPATIENT)
Dept: PRIMARY CARE CLINIC | Age: 71
End: 2021-02-04

## 2021-02-04 ENCOUNTER — APPOINTMENT (OUTPATIENT)
Dept: GENERAL RADIOLOGY | Age: 71
DRG: 305 | End: 2021-02-04
Payer: COMMERCIAL

## 2021-02-04 DIAGNOSIS — I16.0 HYPERTENSIVE URGENCY: Primary | ICD-10-CM

## 2021-02-04 DIAGNOSIS — R07.9 CHEST PAIN, UNSPECIFIED TYPE: ICD-10-CM

## 2021-02-04 PROBLEM — I10 UNCONTROLLED HYPERTENSION: Status: ACTIVE | Noted: 2021-02-04

## 2021-02-04 LAB
-: ABNORMAL
ABSOLUTE EOS #: 0.2 K/UL (ref 0–0.4)
ABSOLUTE IMMATURE GRANULOCYTE: ABNORMAL K/UL (ref 0–0.3)
ABSOLUTE LYMPH #: 0.5 K/UL (ref 1–4.8)
ABSOLUTE MONO #: 0.5 K/UL (ref 0.1–1.2)
ALBUMIN SERPL-MCNC: 4.2 G/DL (ref 3.5–5.2)
ALBUMIN/GLOBULIN RATIO: 1.3 (ref 1–2.5)
ALP BLD-CCNC: 91 U/L (ref 40–129)
ALT SERPL-CCNC: 20 U/L (ref 5–41)
AMORPHOUS: ABNORMAL
ANION GAP SERPL CALCULATED.3IONS-SCNC: 10 MMOL/L (ref 9–17)
AST SERPL-CCNC: 24 U/L
BACTERIA: ABNORMAL
BASOPHILS # BLD: 1 % (ref 0–2)
BASOPHILS ABSOLUTE: 0 K/UL (ref 0–0.2)
BILIRUB SERPL-MCNC: 0.46 MG/DL (ref 0.3–1.2)
BILIRUBIN DIRECT: 0.13 MG/DL
BILIRUBIN URINE: NEGATIVE
BILIRUBIN, INDIRECT: 0.33 MG/DL (ref 0–1)
BUN BLDV-MCNC: 15 MG/DL (ref 8–23)
BUN/CREAT BLD: ABNORMAL (ref 9–20)
CALCIUM SERPL-MCNC: 9.4 MG/DL (ref 8.6–10.4)
CASTS UA: ABNORMAL /LPF
CHLORIDE BLD-SCNC: 104 MMOL/L (ref 98–107)
CO2: 23 MMOL/L (ref 20–31)
COLOR: YELLOW
COMMENT UA: ABNORMAL
CREAT SERPL-MCNC: 0.92 MG/DL (ref 0.7–1.2)
CRYSTALS, UA: ABNORMAL /HPF
DIFFERENTIAL TYPE: ABNORMAL
EOSINOPHILS RELATIVE PERCENT: 5 % (ref 1–4)
EPITHELIAL CELLS UA: ABNORMAL /HPF (ref 0–5)
GFR AFRICAN AMERICAN: >60 ML/MIN
GFR NON-AFRICAN AMERICAN: >60 ML/MIN
GFR SERPL CREATININE-BSD FRML MDRD: ABNORMAL ML/MIN/{1.73_M2}
GFR SERPL CREATININE-BSD FRML MDRD: ABNORMAL ML/MIN/{1.73_M2}
GLOBULIN: NORMAL G/DL (ref 1.5–3.8)
GLUCOSE BLD-MCNC: 111 MG/DL (ref 70–99)
GLUCOSE URINE: NEGATIVE
HCT VFR BLD CALC: 39.2 % (ref 41–53)
HEMOGLOBIN: 13.2 G/DL (ref 13.5–17.5)
IMMATURE GRANULOCYTES: ABNORMAL %
KETONES, URINE: NEGATIVE
LEUKOCYTE ESTERASE, URINE: NEGATIVE
LIPASE: 11 U/L (ref 13–60)
LYMPHOCYTES # BLD: 11 % (ref 24–44)
MCH RBC QN AUTO: 31.8 PG (ref 26–34)
MCHC RBC AUTO-ENTMCNC: 33.8 G/DL (ref 31–37)
MCV RBC AUTO: 94.2 FL (ref 80–100)
MONOCYTES # BLD: 12 % (ref 2–11)
MUCUS: ABNORMAL
NITRITE, URINE: NEGATIVE
NRBC AUTOMATED: ABNORMAL PER 100 WBC
OTHER OBSERVATIONS UA: ABNORMAL
PDW BLD-RTO: 13.8 % (ref 12.5–15.4)
PH UA: 6.5 (ref 5–8)
PLATELET # BLD: 228 K/UL (ref 140–450)
PLATELET ESTIMATE: ABNORMAL
PMV BLD AUTO: 8.1 FL (ref 6–12)
POTASSIUM SERPL-SCNC: 3.8 MMOL/L (ref 3.7–5.3)
PROTEIN UA: NEGATIVE
RBC # BLD: 4.16 M/UL (ref 4.5–5.9)
RBC # BLD: ABNORMAL 10*6/UL
RBC UA: ABNORMAL /HPF (ref 0–2)
RENAL EPITHELIAL, UA: ABNORMAL /HPF
SARS-COV-2, RAPID: NOT DETECTED
SARS-COV-2: NORMAL
SARS-COV-2: NORMAL
SEG NEUTROPHILS: 71 % (ref 36–66)
SEGMENTED NEUTROPHILS ABSOLUTE COUNT: 3.3 K/UL (ref 1.8–7.7)
SODIUM BLD-SCNC: 137 MMOL/L (ref 135–144)
SOURCE: NORMAL
SPECIFIC GRAVITY UA: 1.01 (ref 1–1.03)
TOTAL PROTEIN: 7.4 G/DL (ref 6.4–8.3)
TRICHOMONAS: ABNORMAL
TROPONIN INTERP: NORMAL
TROPONIN T: NORMAL NG/ML
TROPONIN, HIGH SENSITIVITY: 13 NG/L (ref 0–22)
TROPONIN, HIGH SENSITIVITY: 13 NG/L (ref 0–22)
TROPONIN, HIGH SENSITIVITY: 16 NG/L (ref 0–22)
TURBIDITY: CLEAR
URINE HGB: NEGATIVE
UROBILINOGEN, URINE: NORMAL
WBC # BLD: 4.6 K/UL (ref 3.5–11)
WBC # BLD: ABNORMAL 10*3/UL
WBC UA: ABNORMAL /HPF (ref 0–5)
YEAST: ABNORMAL

## 2021-02-04 PROCEDURE — 2500000003 HC RX 250 WO HCPCS: Performed by: EMERGENCY MEDICINE

## 2021-02-04 PROCEDURE — 71045 X-RAY EXAM CHEST 1 VIEW: CPT

## 2021-02-04 PROCEDURE — 81001 URINALYSIS AUTO W/SCOPE: CPT

## 2021-02-04 PROCEDURE — 96374 THER/PROPH/DIAG INJ IV PUSH: CPT

## 2021-02-04 PROCEDURE — 85025 COMPLETE CBC W/AUTO DIFF WBC: CPT

## 2021-02-04 PROCEDURE — 6370000000 HC RX 637 (ALT 250 FOR IP): Performed by: EMERGENCY MEDICINE

## 2021-02-04 PROCEDURE — G0378 HOSPITAL OBSERVATION PER HR: HCPCS

## 2021-02-04 PROCEDURE — 84484 ASSAY OF TROPONIN QUANT: CPT

## 2021-02-04 PROCEDURE — 99283 EMERGENCY DEPT VISIT LOW MDM: CPT

## 2021-02-04 PROCEDURE — 80048 BASIC METABOLIC PNL TOTAL CA: CPT

## 2021-02-04 PROCEDURE — 2580000003 HC RX 258: Performed by: EMERGENCY MEDICINE

## 2021-02-04 PROCEDURE — U0002 COVID-19 LAB TEST NON-CDC: HCPCS

## 2021-02-04 PROCEDURE — 6370000000 HC RX 637 (ALT 250 FOR IP): Performed by: NURSE PRACTITIONER

## 2021-02-04 PROCEDURE — 36415 COLL VENOUS BLD VENIPUNCTURE: CPT

## 2021-02-04 PROCEDURE — 96375 TX/PRO/DX INJ NEW DRUG ADDON: CPT

## 2021-02-04 PROCEDURE — 93005 ELECTROCARDIOGRAM TRACING: CPT | Performed by: EMERGENCY MEDICINE

## 2021-02-04 PROCEDURE — 80076 HEPATIC FUNCTION PANEL: CPT

## 2021-02-04 PROCEDURE — 96360 HYDRATION IV INFUSION INIT: CPT

## 2021-02-04 PROCEDURE — 83690 ASSAY OF LIPASE: CPT

## 2021-02-04 PROCEDURE — 1210000000 HC MED SURG R&B

## 2021-02-04 PROCEDURE — 2580000003 HC RX 258: Performed by: NURSE PRACTITIONER

## 2021-02-04 PROCEDURE — 6360000002 HC RX W HCPCS: Performed by: EMERGENCY MEDICINE

## 2021-02-04 PROCEDURE — 96361 HYDRATE IV INFUSION ADD-ON: CPT

## 2021-02-04 RX ORDER — ONDANSETRON 2 MG/ML
4 INJECTION INTRAMUSCULAR; INTRAVENOUS EVERY 6 HOURS PRN
Status: DISCONTINUED | OUTPATIENT
Start: 2021-02-04 | End: 2021-02-05 | Stop reason: HOSPADM

## 2021-02-04 RX ORDER — MAGNESIUM SULFATE 1 G/100ML
1000 INJECTION INTRAVENOUS PRN
Status: DISCONTINUED | OUTPATIENT
Start: 2021-02-04 | End: 2021-02-05 | Stop reason: HOSPADM

## 2021-02-04 RX ORDER — NITROGLYCERIN 0.4 MG/1
0.4 TABLET SUBLINGUAL EVERY 5 MIN PRN
Status: DISCONTINUED | OUTPATIENT
Start: 2021-02-04 | End: 2021-02-04 | Stop reason: SDUPTHER

## 2021-02-04 RX ORDER — AMLODIPINE BESYLATE 5 MG/1
5 TABLET ORAL 2 TIMES DAILY
Status: DISCONTINUED | OUTPATIENT
Start: 2021-02-04 | End: 2021-02-05 | Stop reason: HOSPADM

## 2021-02-04 RX ORDER — ZOLPIDEM TARTRATE 5 MG/1
5 TABLET ORAL NIGHTLY PRN
Status: DISCONTINUED | OUTPATIENT
Start: 2021-02-04 | End: 2021-02-05 | Stop reason: HOSPADM

## 2021-02-04 RX ORDER — ROSUVASTATIN CALCIUM 20 MG/1
10 TABLET, COATED ORAL NIGHTLY
Status: DISCONTINUED | OUTPATIENT
Start: 2021-02-04 | End: 2021-02-05 | Stop reason: HOSPADM

## 2021-02-04 RX ORDER — ONDANSETRON 2 MG/ML
4 INJECTION INTRAMUSCULAR; INTRAVENOUS ONCE
Status: COMPLETED | OUTPATIENT
Start: 2021-02-04 | End: 2021-02-04

## 2021-02-04 RX ORDER — POTASSIUM CHLORIDE 7.45 MG/ML
10 INJECTION INTRAVENOUS PRN
Status: DISCONTINUED | OUTPATIENT
Start: 2021-02-04 | End: 2021-02-05 | Stop reason: HOSPADM

## 2021-02-04 RX ORDER — OXYBUTYNIN CHLORIDE 15 MG/1
15 TABLET, EXTENDED RELEASE ORAL DAILY
COMMUNITY
End: 2021-02-12 | Stop reason: SDUPTHER

## 2021-02-04 RX ORDER — FLECAINIDE ACETATE 50 MG/1
100 TABLET ORAL 2 TIMES DAILY
Status: DISCONTINUED | OUTPATIENT
Start: 2021-02-04 | End: 2021-02-05 | Stop reason: HOSPADM

## 2021-02-04 RX ORDER — OXYBUTYNIN CHLORIDE 5 MG/1
15 TABLET, EXTENDED RELEASE ORAL DAILY
Status: DISCONTINUED | OUTPATIENT
Start: 2021-02-05 | End: 2021-02-05 | Stop reason: HOSPADM

## 2021-02-04 RX ORDER — 0.9 % SODIUM CHLORIDE 0.9 %
1000 INTRAVENOUS SOLUTION INTRAVENOUS ONCE
Status: COMPLETED | OUTPATIENT
Start: 2021-02-04 | End: 2021-02-04

## 2021-02-04 RX ORDER — ACETAMINOPHEN 650 MG/1
650 SUPPOSITORY RECTAL EVERY 6 HOURS PRN
Status: DISCONTINUED | OUTPATIENT
Start: 2021-02-04 | End: 2021-02-05 | Stop reason: HOSPADM

## 2021-02-04 RX ORDER — ACETAMINOPHEN 325 MG/1
650 TABLET ORAL EVERY 6 HOURS PRN
Status: DISCONTINUED | OUTPATIENT
Start: 2021-02-04 | End: 2021-02-05 | Stop reason: HOSPADM

## 2021-02-04 RX ORDER — NITROGLYCERIN 0.4 MG/1
0.4 TABLET SUBLINGUAL EVERY 5 MIN PRN
Status: DISCONTINUED | OUTPATIENT
Start: 2021-02-04 | End: 2021-02-05 | Stop reason: HOSPADM

## 2021-02-04 RX ORDER — CALCIUM CARBONATE 200(500)MG
1000 TABLET,CHEWABLE ORAL 3 TIMES DAILY PRN
Status: DISCONTINUED | OUTPATIENT
Start: 2021-02-04 | End: 2021-02-05 | Stop reason: HOSPADM

## 2021-02-04 RX ORDER — SODIUM CHLORIDE 0.9 % (FLUSH) 0.9 %
10 SYRINGE (ML) INJECTION PRN
Status: DISCONTINUED | OUTPATIENT
Start: 2021-02-04 | End: 2021-02-05 | Stop reason: HOSPADM

## 2021-02-04 RX ORDER — ATORVASTATIN CALCIUM 40 MG/1
80 TABLET, FILM COATED ORAL NIGHTLY
Status: DISCONTINUED | OUTPATIENT
Start: 2021-02-04 | End: 2021-02-04 | Stop reason: SDUPTHER

## 2021-02-04 RX ORDER — MULTIVITAMIN WITH IRON
1 TABLET ORAL DAILY
Status: DISCONTINUED | OUTPATIENT
Start: 2021-02-05 | End: 2021-02-05 | Stop reason: HOSPADM

## 2021-02-04 RX ORDER — FAMOTIDINE 20 MG/1
20 TABLET, FILM COATED ORAL 2 TIMES DAILY
Status: DISCONTINUED | OUTPATIENT
Start: 2021-02-04 | End: 2021-02-05 | Stop reason: HOSPADM

## 2021-02-04 RX ORDER — SODIUM CHLORIDE 0.9 % (FLUSH) 0.9 %
10 SYRINGE (ML) INJECTION EVERY 12 HOURS SCHEDULED
Status: DISCONTINUED | OUTPATIENT
Start: 2021-02-04 | End: 2021-02-05 | Stop reason: HOSPADM

## 2021-02-04 RX ORDER — CLONIDINE HYDROCHLORIDE 0.1 MG/1
0.1 TABLET ORAL DAILY
Status: DISCONTINUED | OUTPATIENT
Start: 2021-02-05 | End: 2021-02-05 | Stop reason: HOSPADM

## 2021-02-04 RX ORDER — POTASSIUM CHLORIDE 20 MEQ/1
40 TABLET, EXTENDED RELEASE ORAL PRN
Status: DISCONTINUED | OUTPATIENT
Start: 2021-02-04 | End: 2021-02-05 | Stop reason: HOSPADM

## 2021-02-04 RX ADMIN — FLECAINIDE ACETATE 100 MG: 50 TABLET ORAL at 22:39

## 2021-02-04 RX ADMIN — ROSUVASTATIN 10 MG: 20 TABLET, FILM COATED ORAL at 22:40

## 2021-02-04 RX ADMIN — CALCIUM CARBONATE (ANTACID) CHEW TAB 500 MG 1000 MG: 500 CHEW TAB at 23:06

## 2021-02-04 RX ADMIN — SODIUM CHLORIDE 1000 ML: 9 INJECTION, SOLUTION INTRAVENOUS at 17:47

## 2021-02-04 RX ADMIN — SODIUM CHLORIDE, PRESERVATIVE FREE 10 ML: 5 INJECTION INTRAVENOUS at 22:41

## 2021-02-04 RX ADMIN — ONDANSETRON 4 MG: 2 INJECTION INTRAMUSCULAR; INTRAVENOUS at 17:15

## 2021-02-04 RX ADMIN — FAMOTIDINE 20 MG: 10 INJECTION INTRAVENOUS at 20:18

## 2021-02-04 RX ADMIN — NITROGLYCERIN 0.4 MG: 0.4 TABLET, ORALLY DISINTEGRATING SUBLINGUAL at 20:56

## 2021-02-04 ASSESSMENT — ENCOUNTER SYMPTOMS
DIARRHEA: 0
SORE THROAT: 0
ABDOMINAL PAIN: 1
COUGH: 0
VOMITING: 1
SHORTNESS OF BREATH: 0
RHINORRHEA: 0
NAUSEA: 1
CONSTIPATION: 1
PHOTOPHOBIA: 0

## 2021-02-04 ASSESSMENT — PAIN DESCRIPTION - LOCATION: LOCATION: ABDOMEN

## 2021-02-04 ASSESSMENT — PAIN DESCRIPTION - DESCRIPTORS: DESCRIPTORS: ACHING

## 2021-02-04 ASSESSMENT — PAIN SCALES - GENERAL: PAINLEVEL_OUTOF10: 0

## 2021-02-04 ASSESSMENT — PAIN DESCRIPTION - ORIENTATION: ORIENTATION: UPPER

## 2021-02-04 NOTE — ED PROVIDER NOTES
[address] [facility] ED  [address] [facility] [address]  Phone: [phone]  Fax: [phone]        Pt Name: Evelyn Otero  MRN: [id number]  [place] 1950  Date of evaluation: 2/4/21      CHIEF COMPLAINT     Chief Complaint   Patient presents with    Hypertension    Dizziness    Nausea         HISTORY OF PRESENT ILLNESS  (Location/Symptom, Timing/Onset, Context/Setting, Quality, Duration, Modifying Factors, Severity.)    Evelyn Otero is a 79 y.o. male who presents with hypertension, dizziness, and nausea. He reports feeling light-headed since last night and he reports that his head was \"buzzing\". He was feeling mildly nauseated last night and then started feeling very nauseated today at 9:00 AM. He states he did take his blood pressure last night and it was elevated. He took an extra clonidine last night. His blood pressure was 183/97 when he was in the office today. He took a clonidine while at work, and it went up 190/103. He states he did not have chest pain. He did have a mild headache. His wife states he was very nauseated, especially with standing, and he got white as a sheet. He states he went to the ER for something similar in the past, and he was admitted overnight and had his medication titrated. He reports that he has gained 15-20 lbs. He does report swelling in his legs, but he feels it is somewhat worse. He is on amlodipine. He does have history of atrial flutter and has had an ablation. He is not anticoagulated. No history of significant ischemic heart disease. His last cath was less than 1 year ago. He does have a history of prostate cancer and he had radiation after his prostate cancer. He had a history of prostate cancer. He reports generalized fatigue. No numbness, tingling, or weakness. REVIEW OF SYSTEMS    (2-9 systems for level 4, 10 or more for level 5)     Review of Systems   Constitutional: Positive for fatigue. Negative for chills and fever. HENT: Negative for congestion, rhinorrhea and sore throat. Eyes: Negative for photophobia and visual disturbance. Respiratory: Negative for cough and shortness of breath. Cardiovascular: Positive for leg swelling. Negative for chest pain and palpitations. He has had chest pain within the last couple of months. Gastrointestinal: Positive for abdominal pain, constipation, nausea and vomiting. Negative for diarrhea. Epigastric discomfort   Genitourinary: Negative for dysuria, frequency and urgency. Bladder spasm, chronic   Skin: Negative for rash and wound. Neurological: Positive for dizziness, light-headedness and headaches. Hematological: Negative for adenopathy. PAST MEDICAL HISTORY    has a past medical history of Abdominal pain, Arthritis, Back pain, Benign essential HTN, Caffeine use, Cough, Elevated CPK, Elevated PSA measurement, Frequent PVCs, Gout, Hyperlipidemia, Hypertension, Leukocytosis, Need for prophylactic vaccination and inoculation against varicella, Osteoarthritis, Osteoarthrosis, Primary insomnia, Prostate cancer ([facility] [address] Rising PSA level, and Sleep apnea. SURGICAL HISTORY      has a past surgical history that includes hip surgery (2012); Hand surgery; pr lap,prostatectomy,radical,w/nerve spare,incl robotic (N/A, 11/9/2018); Colonoscopy (N/A, 11/6/2019); Upper gastrointestinal endoscopy (11/6/2019); Prostate surgery; Tonsillectomy and adenoidectomy; Carpal tunnel release; Cystocopy (10/08/2020); cystoscopy w biopsy of bladder (N/A, 10/8/2020); and Cystoscopy.     CURRENTMEDICATIONS       Previous Medications    ALLOPURINOL (ZYLOPRIM) 300 MG TABLET    TAKE 3/4 TABLET BY MOUTH DAILY    AMLODIPINE (NORVASC) 5 MG TABLET    Take 2 tablets by mouth daily    CLONIDINE (CATAPRES) 0.1 MG TABLET    Take 1 tablet by mouth daily    FLECAINIDE (TAMBOCOR) 100 MG TABLET    Take 1 tablet by mouth 2 times daily Musculoskeletal: Normal range of motion and neck supple. No muscular tenderness. Cardiovascular:      Rate and Rhythm: Normal rate and regular rhythm. Pulses: Normal pulses. Heart sounds: Normal heart sounds. No murmur. No friction rub. No gallop. Pulmonary:      Effort: Pulmonary effort is normal.      Breath sounds: Normal breath sounds. No wheezing, rhonchi or rales. Abdominal:      General: Abdomen is flat. Bowel sounds are normal.      Palpations: Abdomen is soft. Tenderness: There is no abdominal tenderness. There is no guarding or rebound. Musculoskeletal: Normal range of motion. Right lower leg: Edema present. Left lower leg: Edema present. Neurological:      Mental Status: He is alert. DIFFERENTIAL DIAGNOSIS/ MDM:     Hypertensive urgency. Admission with cardiology consulting.      DIAGNOSTIC RESULTS     EKG: All EKG's are interpreted by the Emergency Department Physician who either signs or Co-signs this chart in the [address] a cardiologist.    EKG Interpretation    Interpreted by emergency department physician    Rhythm: sinus bradycardia  Rate: bradycardia  Axis: left  Ectopy: none  Conduction: left bundle branch block (complete)  ST Segments: nonspecific changes  T Waves: non specific changes  Q Waves: III    Clinical Impression: non-specific EKG    Mora Madrid    EKG Interpretation    Interpreted by emergency department physician    Rhythm: sinus bradycardia  Rate: bradycardia  Axis: left  Ectopy: none  Conduction: left bundle branch block (complete)  ST Segments: no acute change  T Waves: no acute change  Q Waves: III    Clinical Impression: no acute changes, non-specific EKG and left bundle branch block    Mora Madrid      RADIOLOGY:  Non-plain film images such as CT, Ultrasound and MRI are read by the radiologist. Plain radiographic images are visualized and the radiologist interpretations are reviewed as follows: Interpretation per the Radiologist below, if available at the time of this note:    Xr Chest Portable    Result Date: 2/4/2021  EXAMINATION: [address] [place] [address] 2/4/2021 5:39 pm COMPARISON: 02/25/2020 HISTORY: ORDERING SYSTEM PROVIDED HISTORY: chest pain TECHNOLOGIST PROVIDED HISTORY: chest pain Reason for Exam: hypertension, dizziness Acuity: Acute Type of Exam: Initial FINDINGS: The cardiomediastinal silhouette is normal in size and contour. The lungs are clear. No pleural effusion or pneumothorax is present.      No acute cardiopulmonary process       LABS:  Results for orders placed or performed during the hospital encounter of 83/44/80   Basic Metabolic Panel   Result Value Ref Range    Glucose 111 (H) 70 - 99 mg/dL    BUN 15 8 - 23 mg/dL    CREATININE 0.92 0.70 - 1.20 mg/dL    Bun/Cre Ratio NOT REPORTED 9 - 20    Calcium 9.4 8.6 - 10.4 mg/dL    Sodium 137 135 - 144 mmol/L    Potassium 3.8 3.7 - 5.3 mmol/L    Chloride 104 98 - 107 mmol/L    CO2 23 20 - 31 mmol/L    Anion Gap 10 9 - 17 mmol/L    GFR Non-African American >60 >60 mL/min    GFR African American >60 >60 mL/min    GFR Comment          GFR Staging NOT REPORTED    CBC Auto Differential   Result Value Ref Range    WBC 4.6 3.5 - 11.0 k/uL    RBC 4.16 (L) 4.5 - 5.9 m/uL    Hemoglobin 13.2 (L) 13.5 - 17.5 g/dL    Hematocrit 39.2 (L) 41 - 53 %    MCV 94.2 80 - 100 fL    MCH 31.8 26 - 34 pg    MCHC 33.8 31 - 37 g/dL    RDW 13.8 12.5 - 15.4 %    Platelets 793 085 - 394 k/uL    MPV 8.1 6.0 - 12.0 fL    NRBC Automated NOT REPORTED per 100 WBC    Differential Type NOT REPORTED     Seg Neutrophils 71 (H) 36 - 66 %    Lymphocytes 11 (L) 24 - 44 %    Monocytes 12 (H) 2 - 11 %    Eosinophils % 5 (H) 1 - 4 %    Basophils 1 0 - 2 %    Immature Granulocytes NOT REPORTED 0 %    Segs Absolute 3.30 1.8 - 7.7 k/uL    Absolute Lymph # 0.50 (L) 1.0 - 4.8 k/uL    Absolute Mono # 0.50 0.1 - 1.2 k/uL    Absolute Eos # 0.20 0.0 - 0.4 k/uL

## 2021-02-05 ENCOUNTER — APPOINTMENT (OUTPATIENT)
Dept: NUCLEAR MEDICINE | Age: 71
DRG: 305 | End: 2021-02-05
Payer: COMMERCIAL

## 2021-02-05 VITALS
HEIGHT: 67 IN | OXYGEN SATURATION: 97 % | DIASTOLIC BLOOD PRESSURE: 85 MMHG | SYSTOLIC BLOOD PRESSURE: 129 MMHG | RESPIRATION RATE: 16 BRPM | HEART RATE: 65 BPM | TEMPERATURE: 98.3 F | BODY MASS INDEX: 35.52 KG/M2 | WEIGHT: 226.3 LBS

## 2021-02-05 PROBLEM — E78.1 HIGH TRIGLYCERIDES: Status: ACTIVE | Noted: 2018-11-23

## 2021-02-05 PROBLEM — E66.9 CLASS 2 OBESITY IN ADULT: Status: ACTIVE | Noted: 2021-02-05

## 2021-02-05 PROBLEM — I10 UNCONTROLLED HYPERTENSION: Status: RESOLVED | Noted: 2021-02-04 | Resolved: 2021-02-05

## 2021-02-05 LAB
ALBUMIN SERPL-MCNC: 3.9 G/DL (ref 3.5–5.2)
ALBUMIN/GLOBULIN RATIO: 1.4 (ref 1–2.5)
ALP BLD-CCNC: 83 U/L (ref 40–129)
ALT SERPL-CCNC: 18 U/L (ref 5–41)
ANION GAP SERPL CALCULATED.3IONS-SCNC: 9 MMOL/L (ref 9–17)
AST SERPL-CCNC: 22 U/L
BILIRUB SERPL-MCNC: 0.39 MG/DL (ref 0.3–1.2)
BNP INTERPRETATION: NORMAL
BUN BLDV-MCNC: 12 MG/DL (ref 8–23)
BUN/CREAT BLD: ABNORMAL (ref 9–20)
CALCIUM SERPL-MCNC: 9 MG/DL (ref 8.6–10.4)
CHLORIDE BLD-SCNC: 106 MMOL/L (ref 98–107)
CHOLESTEROL/HDL RATIO: 3.3
CHOLESTEROL: 157 MG/DL
CO2: 23 MMOL/L (ref 20–31)
CREAT SERPL-MCNC: 0.9 MG/DL (ref 0.7–1.2)
EKG ATRIAL RATE: 49 BPM
EKG P AXIS: 46 DEGREES
EKG P-R INTERVAL: 192 MS
EKG Q-T INTERVAL: 530 MS
EKG QRS DURATION: 156 MS
EKG QTC CALCULATION (BAZETT): 478 MS
EKG R AXIS: -24 DEGREES
EKG T AXIS: 12 DEGREES
EKG VENTRICULAR RATE: 49 BPM
GFR AFRICAN AMERICAN: >60 ML/MIN
GFR NON-AFRICAN AMERICAN: >60 ML/MIN
GFR SERPL CREATININE-BSD FRML MDRD: ABNORMAL ML/MIN/{1.73_M2}
GFR SERPL CREATININE-BSD FRML MDRD: ABNORMAL ML/MIN/{1.73_M2}
GLUCOSE BLD-MCNC: 101 MG/DL (ref 70–99)
HCT VFR BLD CALC: 39.2 % (ref 41–53)
HDLC SERPL-MCNC: 47 MG/DL
HEMOGLOBIN: 13.1 G/DL (ref 13.5–17.5)
LDL CHOLESTEROL: 67 MG/DL (ref 0–130)
LV EF: 66 %
LVEF MODALITY: NORMAL
MAGNESIUM: 2.2 MG/DL (ref 1.6–2.6)
MCH RBC QN AUTO: 31.5 PG (ref 26–34)
MCHC RBC AUTO-ENTMCNC: 33.3 G/DL (ref 31–37)
MCV RBC AUTO: 94.6 FL (ref 80–100)
NRBC AUTOMATED: ABNORMAL PER 100 WBC
PDW BLD-RTO: 13.8 % (ref 12.5–15.4)
PLATELET # BLD: 219 K/UL (ref 140–450)
PMV BLD AUTO: 7.9 FL (ref 6–12)
POTASSIUM SERPL-SCNC: 3.9 MMOL/L (ref 3.7–5.3)
PRO-BNP: 54 PG/ML
RBC # BLD: 4.15 M/UL (ref 4.5–5.9)
SODIUM BLD-SCNC: 138 MMOL/L (ref 135–144)
TOTAL PROTEIN: 6.7 G/DL (ref 6.4–8.3)
TRIGL SERPL-MCNC: 214 MG/DL
TROPONIN INTERP: NORMAL
TROPONIN T: NORMAL NG/ML
TROPONIN, HIGH SENSITIVITY: 16 NG/L (ref 0–22)
VLDLC SERPL CALC-MCNC: ABNORMAL MG/DL (ref 1–30)
WBC # BLD: 4.1 K/UL (ref 3.5–11)

## 2021-02-05 PROCEDURE — 80061 LIPID PANEL: CPT

## 2021-02-05 PROCEDURE — A9500 TC99M SESTAMIBI: HCPCS | Performed by: NURSE PRACTITIONER

## 2021-02-05 PROCEDURE — 96375 TX/PRO/DX INJ NEW DRUG ADDON: CPT

## 2021-02-05 PROCEDURE — 93017 CV STRESS TEST TRACING ONLY: CPT

## 2021-02-05 PROCEDURE — 6370000000 HC RX 637 (ALT 250 FOR IP): Performed by: NURSE PRACTITIONER

## 2021-02-05 PROCEDURE — 36415 COLL VENOUS BLD VENIPUNCTURE: CPT

## 2021-02-05 PROCEDURE — 94761 N-INVAS EAR/PLS OXIMETRY MLT: CPT

## 2021-02-05 PROCEDURE — 84484 ASSAY OF TROPONIN QUANT: CPT

## 2021-02-05 PROCEDURE — 85027 COMPLETE CBC AUTOMATED: CPT

## 2021-02-05 PROCEDURE — 6370000000 HC RX 637 (ALT 250 FOR IP): Performed by: EMERGENCY MEDICINE

## 2021-02-05 PROCEDURE — 3430000000 HC RX DIAGNOSTIC RADIOPHARMACEUTICAL: Performed by: NURSE PRACTITIONER

## 2021-02-05 PROCEDURE — G0378 HOSPITAL OBSERVATION PER HR: HCPCS

## 2021-02-05 PROCEDURE — APPSS30 APP SPLIT SHARED TIME 16-30 MINUTES: Performed by: NURSE PRACTITIONER

## 2021-02-05 PROCEDURE — 2580000003 HC RX 258: Performed by: NURSE PRACTITIONER

## 2021-02-05 PROCEDURE — 78452 HT MUSCLE IMAGE SPECT MULT: CPT

## 2021-02-05 PROCEDURE — 80053 COMPREHEN METABOLIC PANEL: CPT

## 2021-02-05 PROCEDURE — 6360000002 HC RX W HCPCS: Performed by: NURSE PRACTITIONER

## 2021-02-05 PROCEDURE — 93005 ELECTROCARDIOGRAM TRACING: CPT | Performed by: NURSE PRACTITIONER

## 2021-02-05 PROCEDURE — 94660 CPAP INITIATION&MGMT: CPT

## 2021-02-05 PROCEDURE — 83880 ASSAY OF NATRIURETIC PEPTIDE: CPT

## 2021-02-05 PROCEDURE — 83735 ASSAY OF MAGNESIUM: CPT

## 2021-02-05 RX ORDER — SODIUM CHLORIDE 0.9 % (FLUSH) 0.9 %
10 SYRINGE (ML) INJECTION ONCE
Status: COMPLETED | OUTPATIENT
Start: 2021-02-05 | End: 2021-02-05

## 2021-02-05 RX ORDER — ASPIRIN 81 MG/1
81 TABLET ORAL DAILY
Qty: 30 TABLET | Refills: 3 | Status: CANCELLED | OUTPATIENT
Start: 2021-02-06

## 2021-02-05 RX ORDER — ASPIRIN 81 MG/1
81 TABLET ORAL DAILY
Qty: 30 TABLET | Refills: 3 | Status: SHIPPED
Start: 2021-02-06 | End: 2021-09-15

## 2021-02-05 RX ORDER — METOPROLOL TARTRATE 5 MG/5ML
5 INJECTION INTRAVENOUS EVERY 5 MIN PRN
Status: ACTIVE | OUTPATIENT
Start: 2021-02-05 | End: 2021-02-05

## 2021-02-05 RX ORDER — SODIUM CHLORIDE 9 MG/ML
500 INJECTION, SOLUTION INTRAVENOUS CONTINUOUS PRN
Status: ACTIVE | OUTPATIENT
Start: 2021-02-05 | End: 2021-02-05

## 2021-02-05 RX ORDER — ASPIRIN 81 MG/1
81 TABLET ORAL DAILY
Status: DISCONTINUED | OUTPATIENT
Start: 2021-02-06 | End: 2021-02-05 | Stop reason: HOSPADM

## 2021-02-05 RX ORDER — NITROGLYCERIN 0.4 MG/1
0.4 TABLET SUBLINGUAL EVERY 5 MIN PRN
Status: ACTIVE | OUTPATIENT
Start: 2021-02-05 | End: 2021-02-05

## 2021-02-05 RX ORDER — AMINOPHYLLINE DIHYDRATE 25 MG/ML
50 INJECTION, SOLUTION INTRAVENOUS PRN
Status: ACTIVE | OUTPATIENT
Start: 2021-02-05 | End: 2021-02-05

## 2021-02-05 RX ORDER — SODIUM CHLORIDE 0.9 % (FLUSH) 0.9 %
10 SYRINGE (ML) INJECTION PRN
Status: ACTIVE | OUTPATIENT
Start: 2021-02-05 | End: 2021-02-05

## 2021-02-05 RX ORDER — SPIRONOLACTONE 25 MG/1
25 TABLET ORAL DAILY
Qty: 30 TABLET | Refills: 0 | Status: SHIPPED | OUTPATIENT
Start: 2021-02-05 | End: 2021-02-23 | Stop reason: SDUPTHER

## 2021-02-05 RX ORDER — SPIRONOLACTONE 25 MG/1
25 TABLET ORAL DAILY
Status: DISCONTINUED | OUTPATIENT
Start: 2021-02-05 | End: 2021-02-05 | Stop reason: HOSPADM

## 2021-02-05 RX ORDER — ATROPINE SULFATE 0.1 MG/ML
0.5 INJECTION INTRAVENOUS EVERY 5 MIN PRN
Status: ACTIVE | OUTPATIENT
Start: 2021-02-05 | End: 2021-02-05

## 2021-02-05 RX ADMIN — Medication 10 ML: at 10:37

## 2021-02-05 RX ADMIN — Medication 10 ML: at 11:17

## 2021-02-05 RX ADMIN — ASPIRIN 325 MG: 325 TABLET, COATED ORAL at 08:02

## 2021-02-05 RX ADMIN — NITROGLYCERIN 0.4 MG: 0.4 TABLET, ORALLY DISINTEGRATING SUBLINGUAL at 03:51

## 2021-02-05 RX ADMIN — TETRAKIS(2-METHOXYISOBUTYLISOCYANIDE)COPPER(I) TETRAFLUOROBORATE 12.5 MILLICURIE: 1 INJECTION, POWDER, LYOPHILIZED, FOR SOLUTION INTRAVENOUS at 10:40

## 2021-02-05 RX ADMIN — LISINOPRIL 15 MG: 5 TABLET ORAL at 08:03

## 2021-02-05 RX ADMIN — FAMOTIDINE 20 MG: 20 TABLET ORAL at 08:02

## 2021-02-05 RX ADMIN — FLECAINIDE ACETATE 100 MG: 50 TABLET ORAL at 08:03

## 2021-02-05 RX ADMIN — THERA TABS 1 TABLET: TAB at 08:01

## 2021-02-05 RX ADMIN — TETRAKIS(2-METHOXYISOBUTYLISOCYANIDE)COPPER(I) TETRAFLUOROBORATE 33.5 MILLICURIE: 1 INJECTION, POWDER, LYOPHILIZED, FOR SOLUTION INTRAVENOUS at 11:40

## 2021-02-05 RX ADMIN — AMLODIPINE BESYLATE 5 MG: 5 TABLET ORAL at 08:03

## 2021-02-05 RX ADMIN — REGADENOSON 0.4 MG: 0.08 INJECTION, SOLUTION INTRAVENOUS at 10:37

## 2021-02-05 SDOH — HEALTH STABILITY: MENTAL HEALTH: HOW OFTEN DO YOU HAVE A DRINK CONTAINING ALCOHOL?: 4 OR MORE TIMES A WEEK

## 2021-02-05 ASSESSMENT — PAIN DESCRIPTION - FREQUENCY: FREQUENCY: CONTINUOUS

## 2021-02-05 ASSESSMENT — ENCOUNTER SYMPTOMS
VOMITING: 0
NAUSEA: 1
ABDOMINAL PAIN: 0
EYES NEGATIVE: 1
RESPIRATORY NEGATIVE: 1

## 2021-02-05 ASSESSMENT — PAIN SCALES - GENERAL: PAINLEVEL_OUTOF10: 2

## 2021-02-05 ASSESSMENT — PAIN DESCRIPTION - ORIENTATION: ORIENTATION: LEFT

## 2021-02-05 ASSESSMENT — PAIN DESCRIPTION - DESCRIPTORS: DESCRIPTORS: HEAVINESS

## 2021-02-05 NOTE — PROGRESS NOTES
[address] NP notified of heart rate running in mid 40's to low 50's while patient is asleep.   -notify cardiologist    Cleo Zuñiga- Perfectserved Faiza Chacko MD of his heart rate and his vital at admission.    - awaiting for answer    [facility] NP notified of heart rate, most recent vital, and waiting for answer from cardiologist.   - CPAP, trop at 0300, update with heart rate.

## 2021-02-05 NOTE — ED NOTES
Pt presents to ED with complaints of elevated blood pressure, \"in 190s\", nausea/vomiting, and dizziness. Patient denies any SOB. Does report mild mid epigastric/abdominal pain 1/10 in intensity, not aggravated/alleviated by anything. Patient is alert and oriented x4. Able to speak in full sentences. Cardiac tones are regular, sinus rhythm on telemetry. Respiratory assessment is WNL, clear bilateral lung sounds, RR WNL. +1 edema noted in his bilateral lower extremities which he states is chronic. Abdomen is soft, obese, non-tender. Bowel sounds active x4. Patient does not have any neurological deficits, is able to move all of his extremities freely. Skin is pink/warm/dry.      Bonnie Rueda RN  02/04/21 0610

## 2021-02-05 NOTE — CONSULTS
[facility]    Consult                        Today's Date: 2/5/2021  Patient Name: Rogerio James  Date of admission: 2/4/2021  4:51 PM  Patient's age: 79 y.o., 1950  Admission Dx: Uncontrolled hypertension [I10]    Reason for Consult:  Cardiac evaluation    Requesting Physician: Sinai Chatterjee MD    CHIEF COMPLAINT:  HTN     History Obtained From:  patient, electronic medical record    HISTORY OF PRESENT ILLNESS:     Rogerio James is a 79 y.o. male who presents with hypertension, dizziness, and nausea. He reports feeling light-headed since last night and he reports that his head was \"buzzing\". He was feeling mildly nauseated last night and then started feeling very nauseated today at 9:00 AM. He states he did take his blood pressure last night and it was elevated. He took an extra clonidine last night. His blood pressure was 183/97 when he was in the office today. He took a clonidine while at work, and it went up 190/103. He states he did not have chest pain. He did have a mild headache. His wife states he was very nauseated, especially with standing, and he got white as a sheet. He states he went to the ER for something similar in the past, and he was admitted overnight and had his medication titrated. He reports that he has gained 15-20 lbs. He does report swelling in his legs, but he feels it is somewhat worse. He is on amlodipine. He does have history of atrial flutter and has had an ablation. He is not anticoagulated    While in the ER he started to have some left sided chest pain at a \"7\". SL nitro given and pain was relieved. Overnight and into the am, he had another episode of CP, once again, relieved by SL nitro. BP stable now. Pt does report having some chest pain at home over the last 2-3 months. He denies any SOB. He does have dizziness at times. Past Medical History:   has a past medical history of Abdominal pain, Arthritis, Back pain, Benign essential HTN, Caffeine use, Cough, Elevated CPK, Elevated PSA measurement, Frequent PVCs, Gout, Hyperlipidemia, Hypertension, Leukocytosis, Need for prophylactic vaccination and inoculation against varicella, Osteoarthritis, Osteoarthrosis, Primary insomnia, Prostate cancer ([facility] [address] Rising PSA level, and Sleep apnea. Past Surgical History:   has a past surgical history that includes hip surgery (2012); Hand surgery; pr lap,prostatectomy,radical,w/nerve spare,incl robotic (N/A, 11/9/2018); Colonoscopy (N/A, 11/6/2019); Upper gastrointestinal endoscopy (11/6/2019); Prostate surgery; Tonsillectomy and adenoidectomy; Carpal tunnel release; Cystocopy (10/08/2020); cystoscopy w biopsy of bladder (N/A, 10/8/2020); and Cystoscopy. Home Medications:    Prior to Admission medications    Medication Sig Start Date End Date Taking?  Authorizing Provider   flecainide (TAMBOCOR) 100 MG tablet Take 1 tablet by mouth 2 times daily 12/23/20 2/4/21 Yes JHONNY Barron CNP   cloNIDine (CATAPRES) 0.1 MG tablet Take 1 tablet by mouth daily  Patient taking differently: Take 0.1 mg by mouth nightly  12/22/20 2/4/21 Yes Madyson Flores PA-C   lisinopril (PRINIVIL;ZESTRIL) 10 MG tablet Takes 1 pill in the am and 2 pills in the PM  Patient taking differently: Take 15 mg by mouth 2 times daily  12/22/20 2/4/21 Yes Madyson Flores PA-C   amLODIPine (NORVASC) 5 MG tablet Take 2 tablets by mouth daily  Patient taking differently: 5 mg 2 times daily  12/22/20 2/4/21 Yes Madyson Flores PA-C   rosuvastatin (CRESTOR) 10 MG tablet Take 1 tablet by mouth nightly 12/22/20 2/4/21 Yes Madyson Flores PA-C   allopurinol (ZYLOPRIM) 300 MG tablet TAKE 3/4 TABLET BY MOUTH DAILY  Patient taking differently: TAKE 3/4 TABLET BY MOUTH NIGHTLY 11/30/20  Yes Madyson Flores PA-C BP (!) 140/81   Pulse 52   Temp 98.2 °F (36.8 °C) (Oral)   Resp 18   Ht 5' 7\" (1.702 m)   Wt 226 lb 4.8 oz (102.6 kg)   SpO2 96%   BMI 35.44 kg/m²    Constitutional and General Appearance: alert, cooperative, no distress and appears stated age  HEENT: PERRL, no cervical lymphadenopathy. No masses palpable. Normal oral mucosa  Respiratory:  · Normal excursion and expansion without use of accessory muscles  · Resp Auscultation: Good respiratory effort. No for increased work of breathing. On auscultation: clear to auscultation bilaterally  Cardiovascular:  · Heart tones are crisp and normal. regular S1 and S2.  · Jugular venous pulsation Normal  · The carotid upstroke is normal in amplitude and contour without delay or bruit   Abdomen:   · soft  · Bowel sounds present  Extremities:  ·  No edema  Neurological:  · Alert and oriented. EKG:   Atrial Flutter with LBBB        Dr. Elisha Eugene 2/14/19:   1. Longstanding idiopathic PVCs and PACs. A 48-hour Holter monitor from 5/31/18 showed average sinus rate of 61 bpm, with a minimum of 47 and maximum of 95 bpm; 253 mostly isolated PVCs were noted, with no nonsustained ventricular tachycardia. A total of 860 PACs were noted; however, with no PAT or paroxysmal atrial fibrillation. 2. History of hypertension. 3. Hyperlipidemia. 4. History of osteoarthritis, on meloxicam.  5. A 2D echo from 6/15/18 showing LVEF 55%, with normal diastolic function, 2+ mitral regurgitation and mild to moderate tricuspid regurgitation. PA systolic of 47 mmHg. 6. Treadmill Cardiolite study from 6/15/18 showing normal perfusion study, with LVEF of 64%. The patient exercised over 8 minutes, with a peak heart rate of 133 bpm (86%). No arrhythmias were noted. 7. History of prostatism with elevated PSA.     Plan --  1. Frequent PAC's and PAT Maintaining good suppression on flecainide; will continue current dose.  Will plan for 6 month follow-up 2. Essential HTN Maintaining good control on current regimen of amlodipine, clonidine, lisinopril and Lasix. 3. Hyperlipidemia Continue Crestor          DATA:    Diagnostics:    EKG: normal EKG, normal sinus rhythm, sinus bradycardia, unchanged from previous tracings. STRESS 6/15/18: No ischemia/infarct. EF 64%.      ECHO 6/15/18: EF 55%, moderate MR, mild to moderate TR, PAP 47 mmHg.      HOLTER 5/31/18: SR with SB. Rare PACs with pairs. Rare PVCs. AFib Ablation 2/26/20  ECHO 2/27/20  Summary  Ischemic and pulmonary heart disease with preserved EF  Left ventricle is normal in size. Global left ventricular systolic function  is borderline with abnormal segmental wall motion. Mild inferior and inferolateral hypokinesis. Estimated ejection fraction is 45-50 % . Calculated EF via heart model is 48 %. Mild left ventricular hypertrophy with normal diastolic function. inferior and inferolateral hypokinesis. Mild pulmonary HTN, mild tricuspid regurgitation with mild dilatation of the  RV & RVOT    Cardiac cath 3/2020   Non obstructive minimal disease in calcified LAD   Normal LV function  Labs:     CBC:   Recent Labs     02/04/21 1715 02/05/21 0314   WBC 4.6 4.1   HGB 13.2* 13.1*   HCT 39.2* 39.2*    219     BMP:   Recent Labs     02/04/21 1715 02/05/21 0314    138   K 3.8 3.9   CO2 23 23   BUN 15 12   CREATININE 0.92 0.90   LABGLOM >60 >60   GLUCOSE 111* 101*     BNP: No results for input(s): BNP in the last 72 hours. PT/INR: No results for input(s): PROTIME, INR in the last 72 hours. APTT:No results for input(s): APTT in the last 72 hours. CARDIAC ENZYMES:No results for input(s): CKTOTAL, CKMB, CKMBINDEX, TROPONINI in the last 72 hours.   FASTING LIPID PANEL:  Lab Results   Component Value Date    HDL 50 10/15/2020    TRIG 136 10/15/2020     LIVER PROFILE:  Recent Labs     02/04/21 1715 02/05/21  0314   AST 24 22   ALT 20 18   LABALBU 4.2 3.9       IMPRESSION: Patient Active Problem List   Diagnosis    Asymptomatic premature ventricular contractions    Primary insomnia    Prostate cancer ([facility] [address]    Gout    Hyperlipidemia    Hypertension    Osteoarthrosis    Atrial flutter (HCC)    Hypokalemia    Atrial flutter with rapid ventricular response (HCC)    Frequency of micturition    Urge incontinence    History of prostate cancer    Gross hematuria    Renal cyst, right    Radiation cystitis    Uncontrolled hypertension       RECOMMENDATIONS:  1. Chest pain relieved with SL nitro. Negative Enzymes. Discussed with Dr. Magui Hernández. Will proceed with Stress test to ensure no ischemia. 2. HTN. BP improved. Continue Norvasc, clonidine and lisinopril. 3. PVCs-  Stable. Continue flecainide   4. If stress test negative, Ok to d/c and follow up as outpt with Dr. Job Joy. Discussed with patient and Nurse. Gracy Arreola, [address] Cardiology Consult           [phone]  Attending Physician Statement  I have discussed the care of the patient, including pertinent history and exam findings, with the resident. I have seen and examined the patient and the key elements of all parts of the encounter have been performed by me. I agree with the assessment, plan and orders as documented by the resident.   Awaiting for stress test results   High BP , will add Aldactone 25 mg qd   Watch BP at home   Check BMP in 1-2 wks   If stress test negative then fDc and follow up Dr Cherry Guan MD

## 2021-02-05 NOTE — ED NOTES
Pt states minimal relief from IV pepcid. Nitro 0.4mg sublingual given to patient per order.      Robert Owens RN  02/04/21 [id number]

## 2021-02-05 NOTE — DISCHARGE SUMMARY
[facility]  Office: Saud Purcell, DO, Mariam Ormond, DO, Miladis Diehl, DO, Nancy Avalos Blood, DO, Gisela Moss MD, Lorena Mcarthur MD, Janis Guerrero MD, Rivera Garcia MD, Taco Curran MD, Jessica Kennedy MD, Marvin Erickson MD, Sharyle Haff, MD, Christos Zuñiga MD, Yvonne Bliss DO, Polo Rose MD, Martha Negron MD, Travis Betancourt DO, Sally Sutton MD,  Janell Ornelas DO, Cali Clarke MD, Hayden Montgomery MD, Stephanie Garay, [facility], [address], [facility], Sage Koehler, CNP, Berry Ervin, CNS, Alejandrina Hernández, CNP, Bia Cotton, CNP, Maynor Palomares, CNP, Marius Spatz, CNP, Jose Nunn, CNP, Sadaf Urbano PA-C, Kathrin Hyde, [facility], Sara Fisher, CNP, Lance Hall, CNP, Shabnam Aguero, CNP, Hannah Qureshi, CNP, Melanie Hassan, [facility]   [address] [facility]    Discharge Summary     Patient ID: Marco Sandoval  :  1950   MRN: [id number]     ACCOUNT:  [de-identified]   Patient's PCP: Amirah Duarte PA-C  Admit Date: 2021   Discharge Date: 2021   Length of Stay: 1  Code Status:  Full Code  Admitting Physician: Martha Negron MD  Discharge Physician: JHONNY Pastor NP     Active Discharge Diagnoses:     Hospital Problem Lists:  Principal Problem (Resolved):     Uncontrolled hypertension  Active Problems:    Benign essential HTN    High triglycerides    Class 2 obesity in adult      Admission Condition:  good     Discharged Condition: good    Hospital Stay:     Hospital Course:  Marco Sandoval is a 79 y.o. male who was admitted for the management of  Uncontrolled hypertension , presented to ER with Hypertension, Dizziness, and Nausea 1. No discrete perfusion abnormality to suggest myocardial ischemia/infarction. 2. No wall motion abnormality. Calculated ejection fraction of 66%. 3. Risk stratification: Low Notes concerning risk stratification: Risk stratification incorporates both clinical history and some testing results. Final risk determination is the responsibility of the ordering provider as other patient information and test results may increase or decrease the risk assessment reported for this examination. Risk stratification criteria are adapted from \"Noninvasive Risk Stratification\" criteria from [facility]. Al, ACC/AATS/AHA/ASE/ASNC/SCAI/SCCT/STS 2017 Appropriate Use Criteria For Coronary Revascularization in Patients With Stable Ischemic Heart Disease [facility] Volume 69, Issue 17, May 2017 High risk (>3% annual death or MI) 1. Severe resting LV dysfunction (LVEF <35%) not readily explained by non coronary causes 2. Resting perfusion abnormalities greater than 10% of the myocardium in patients without prior history or evidence of MI3. Stress-induced perfusion abnormalities encumbering greater than or equal to 10% myocardium or stress segmental scores indicating multiple vascular territories with abnormalities 4. Stress-induced LV dilatation (TID ratio greater than 1.19 for exercise and greater than 1.39 for regadenoson) Intermediate risk (1% to 3% annual death or MI) 1. Mild/moderate resting LV dysfunction (LVEF 35% to 49%) not readily explained by non coronary causes. 2. Resting perfusion abnormalities in 5%-9.9% of the myocardium in patients without a history or prior evidence of MI 3. Stress-induced perfusion abnormality encumbering 5%-9.9% of the myocardium or stress segmental scores indicating 1 vascular territory with abnormalities but without LV dilation 4. Small wall motion abnormality involving 1-2 segments and only 1 coronary bed. Low Risk (Less than 1% annual death or MI) 1.  Normal or small myocardial perfusion defect at rest or with stress encumbering less than 5% of the myocardium. Consultations:    Consults:     Final Specialist Recommendations/Findings:   IP CONSULT TO RESPIRATORY CARE  IP CONSULT TO CARDIOLOGY      The patient was seen and examined on day of discharge and this discharge summary is in conjunction with any daily progress note from day of discharge. Discharge plan:     Disposition: Home    Physician Follow Up:     MD Annie Espino [address]  [address] [facility]    Go on 2/25/2021  8:30am for hospital follow up    Bianka Paniagua, [address] [place] [id number]  [phone]    Schedule an appointment as soon as possible for a visit in 1 week  Follow-up within 1 week       Requiring Further Evaluation/Follow Up POST HOSPITALIZATION/Incidental Findings:     NA    Diet: cardiac diet    Activity: As tolerated    Instructions to Patient:     Take medication as prescribed    Cardiac/no added salt diet    Follow-up with PCP within 1 week    Follow-up with cardiology as scheduled    Discharge Medications:      Medication List      START taking these medications    aspirin 81 MG EC tablet  Take 1 tablet by mouth daily  Start taking on: February 6, 2021     spironolactone 25 MG tablet  Commonly known as: ALDACTONE  Take 1 tablet by mouth daily        CHANGE how you take these medications    allopurinol 300 MG tablet  Commonly known as: ZYLOPRIM  TAKE 3/4 TABLET BY MOUTH DAILY  What changed: additional instructions     amLODIPine 5 MG tablet  Commonly known as: NORVASC  Take 2 tablets by mouth daily  What changed:   · how much to take  · how to take this  · when to take this     cloNIDine 0.1 MG tablet  Commonly known as: CATAPRES  Take 1 tablet by mouth daily  What changed: when to take this     lisinopril 10 MG tablet  Commonly known as: PRINIVIL;ZESTRIL  Takes 1 pill in the am and 2 pills in the PM  What changed:   · how much to take  · how to take this  · when to take this · additional instructions     oxybutynin 15 MG extended release tablet  Commonly known as: DITROPAN XL  What changed: Another medication with the same name was removed. Continue taking this medication, and follow the directions you see here. CONTINUE taking these medications    flecainide 100 MG tablet  Commonly known as: TAMBOCOR  Take 1 tablet by mouth 2 times daily     meloxicam 7.5 MG tablet  Commonly known as: MOBIC  TAKE 1 TABLET BY MOUTH 2 TIMES A DAY     Multi-Vitamin Tabs     nitroGLYCERIN 0.4 MG SL tablet  Commonly known as: NITROSTAT  Place 1 tablet under the tongue every 5 minutes as needed for Chest pain     rosuvastatin 10 MG tablet  Commonly known as: CRESTOR  Take 1 tablet by mouth nightly           Where to Get Your Medications      These medications were sent to [address] [place] [address]    Phone: [phone]   · aspirin 81 MG EC tablet  · spironolactone 25 MG tablet         No discharge procedures on file. Time Spent on discharge is  29 mins in patient examination, evaluation, counseling as well as medication reconciliation, prescriptions for required medications, discharge plan and follow up. Electronically signed by   JHONNY Schilling NP  2/5/2021  6:48 PM      Thank you Dr. Yoandy Riley PA-C for the opportunity to be involved in this patient's care. No

## 2021-02-05 NOTE — PROCEDURES
[address]                [address] [facility] [address]                              CARDIAC STRESS TEST    PATIENT NAME: Amanda Woods                     :        1950  MED REC NO:   [id number]                             ROOM:       OTFP  ACCOUNT NO:   [de-identified]                           ADMIT DATE: 2021  PROVIDER:     Lluvia Vega MD    CARDIOVASCULAR DIAGNOSTIC DEPARTMENT    DATE OF STUDY:  2021    ORDERING PROVIDER:  NEVILLE Ferraro    PRIMARY CARE PROVIDER:  Landry Nelson    INTERPRETING PHYSICIAN:  VAMSHI DENNIS    LEXISCAN STRESS TESTING    TEST TYPE: LEXISCAN CARDIOLYTE STRESS TEST  INDICATION: CHEST PAIN  RESTING HEART RATE: 63 bpm  RESTING BLOOD PRESSURE: 154/78 mmHg  PEAK HEART RATE: 81 bpm  PEAK BLOOD PRESSURE: 154/78 mmHg    MEDICATION(S) GIVEN: 0.4 mg IV LEXISCAN. PO CAFFEINE. REASON FOR TERMINATION: MEDICATION INFUSION COMPLETE  SYMPTOMS:  FEELING FLUSHED, THROAT BURNING. POST INJECTION. RESOLVED IN  RECOVERY. RESTING EKG: ABNORMAL. NORMAL SINUS, LEFT BUNDLE BRANCH BLOCK         . STRESS HEART RESPONSE: NORMAL RESPONSE.  BLOOD PRESSURE RESPONSE: HYPERTENSIVE. STRESS EKGs: NORMAL. CHEST DISCOMFORT: NO PAIN DURING STRESS. NO PAIN DURING RECOVERY. ISCHEMIC EKG CHANGES: UNINTERPRETABLE. EKG IMPRESSION: NEGATIVE ELECTROCARDIOGRAPHICALLY LEXISCAN STRESS TEST. NUCLEAR SCAN TO FOLLOW.       Lashae Waldrop MD    D: 2021 12:03:58       T: 2021 12:05:53     /LUISA  Job#: [id number]     Doc#: Unknown    CC:    (Retain this field even if not dictated or not decipherable)

## 2021-02-05 NOTE — PROGRESS NOTES
Pt agreeable to hospital cpap at this time. Pt states his home settings are cpap-12.   Pt tolerating well at this time

## 2021-02-05 NOTE — PROGRESS NOTES
Pt c/o chest pain 3/10  EKG completed, notified Delgrosso NP    Nitro given once  Update of vital notified.

## 2021-02-05 NOTE — PLAN OF CARE
Problem: Pain:  Goal: Pain level will decrease  2/5/2021 1032 by Gumaro Desouza RN  Outcome: Ongoing   Pain scale preformed per protocol and pt treated for pain as documented. Education given. Problem: Falls - Risk of:  Goal: Will remain free from falls  2/5/2021 1032 by Gumaro Desouza RN  Outcome: Ongoing   Fall assessment preformed. Bed in low locked position with call light and tray table within reach. Education given. Will continue to monitor.

## 2021-02-05 NOTE — PROGRESS NOTES
Pt has removed his cpap. States he has not been able to fall asleep since its been put on and is refusing to use it further at this time. Nurse practitioner on call notified by perfect serve.

## 2021-02-05 NOTE — PROGRESS NOTES
Patient to Stress Lab, Patient Educated on Procedure and Consent Signed: Patient  Responded to Education and Verbalize Understanding. PLACED ON EKG /MONITOR AND VITALS MACHINE.  IV Flushed with NORMAL SALINE. PATIENT denies any chest pain or pressure at this time ; HAIM LOZADA SEEN PATIENT AT THE BEDSIDE AND REVIEWED EKG, NSR  With Left BBB. BP, 154/78 , HR 81   .

## 2021-02-05 NOTE — PROGRESS NOTES
Patient to go to stress test this morning. Cleared by Roberta Lu Np to shower prior to. Patient assisted to shower. Edu given and all questions answered to satisfaction.

## 2021-02-05 NOTE — PROGRESS NOTES
Spoke with Dr. Orlando Lai with cardiology regarding consult. No stress test at this time. Asked that writer call office so NP on call to see patient. Office called and Michelle Xiaoer to round on patient this am. Patient updated. Will continue to monitor.

## 2021-02-05 NOTE — PROGRESS NOTES
Pt admitted to room from ER  Oriented to room and call light/tv controls. Bed in lowest position, wheels locked, 2/4 side rails up  Call light in reach, room free of clutter, adequate lighting provided. Admission questions completed. Guera Reed NP paged to update home BP medications.   BP medications all ordered and regimen agreed with both patient and NP.    2250 patient requesting for antacid  TUMS ordered per Guera Reed NP

## 2021-02-05 NOTE — ED PROVIDER NOTES
[id number] [facility]  [id number] [facility] [address] [facility] [address]  Phone: [phone]  Fax: [phone]        ADDENDUM:      Care of this patient was assumed from Dr. Arti Conteh. The patient was seen for Hypertension, Dizziness, and Nausea  . The patient's initial evaluation and plan have been discussed with the prior provider who initially evaluated the patient. Nursing Notes, Past Medical Hx, Past Surgical Hx, Allergies, were all reviewed. PAST MEDICAL HISTORY    has a past medical history of Abdominal pain, Arthritis, Back pain, Benign essential HTN, Caffeine use, Cough, Elevated CPK, Elevated PSA measurement, Frequent PVCs, Gout, Hyperlipidemia, Hypertension, Leukocytosis, Need for prophylactic vaccination and inoculation against varicella, Osteoarthritis, Osteoarthrosis, Primary insomnia, Prostate cancer ([facility] [address] Rising PSA level, and Sleep apnea. SURGICAL HISTORY      has a past surgical history that includes hip surgery (2012); Hand surgery; pr lap,prostatectomy,radical,w/nerve spare,incl robotic (N/A, 11/9/2018); Colonoscopy (N/A, 11/6/2019); Upper gastrointestinal endoscopy (11/6/2019); Prostate surgery; Tonsillectomy and adenoidectomy; Carpal tunnel release; Cystocopy (10/08/2020); cystoscopy w biopsy of bladder (N/A, 10/8/2020); and Cystoscopy.     CURRENT MEDICATIONS       Previous Medications    ALLOPURINOL (ZYLOPRIM) 300 MG TABLET    TAKE 3/4 TABLET BY MOUTH DAILY    AMLODIPINE (NORVASC) 5 MG TABLET    Take 2 tablets by mouth daily    CLONIDINE (CATAPRES) 0.1 MG TABLET    Take 1 tablet by mouth daily    FLECAINIDE (TAMBOCOR) 100 MG TABLET    Take 1 tablet by mouth 2 times daily    LISINOPRIL (PRINIVIL;ZESTRIL) 10 MG TABLET    Takes 1 pill in the am and 2 pills in the PM    MELOXICAM (MOBIC) 7.5 MG TABLET    TAKE 1 TABLET BY MOUTH 2 TIMES A DAY    MULTIPLE VITAMIN (MULTI-VITAMIN) TABS    Take 1 tablet by mouth NITROGLYCERIN (NITROSTAT) 0.4 MG SL TABLET    Place 1 tablet under the tongue every 5 minutes as needed for Chest pain    OXYBUTYNIN (DITROPAN XL) 15 MG EXTENDED RELEASE TABLET    Take 15 mg by mouth daily    ROSUVASTATIN (CRESTOR) 10 MG TABLET    Take 1 tablet by mouth nightly       ALLERGIES     is allergic to phenothiazines; sulfa antibiotics; and prochlorperazine edisylate.       Diagnostic Results     LABS:   Results for orders placed or performed during the hospital encounter of 19/93/37   Basic Metabolic Panel   Result Value Ref Range    Glucose 111 (H) 70 - 99 mg/dL    BUN 15 8 - 23 mg/dL    CREATININE 0.92 0.70 - 1.20 mg/dL    Bun/Cre Ratio NOT REPORTED 9 - 20    Calcium 9.4 8.6 - 10.4 mg/dL    Sodium 137 135 - 144 mmol/L    Potassium 3.8 3.7 - 5.3 mmol/L    Chloride 104 98 - 107 mmol/L    CO2 23 20 - 31 mmol/L    Anion Gap 10 9 - 17 mmol/L    GFR Non-African American >60 >60 mL/min    GFR African American >60 >60 mL/min    GFR Comment          GFR Staging NOT REPORTED    CBC Auto Differential   Result Value Ref Range    WBC 4.6 3.5 - 11.0 k/uL    RBC 4.16 (L) 4.5 - 5.9 m/uL    Hemoglobin 13.2 (L) 13.5 - 17.5 g/dL    Hematocrit 39.2 (L) 41 - 53 %    MCV 94.2 80 - 100 fL    MCH 31.8 26 - 34 pg    MCHC 33.8 31 - 37 g/dL    RDW 13.8 12.5 - 15.4 %    Platelets 928 697 - 827 k/uL    MPV 8.1 6.0 - 12.0 fL    NRBC Automated NOT REPORTED per 100 WBC    Differential Type NOT REPORTED     Seg Neutrophils 71 (H) 36 - 66 %    Lymphocytes 11 (L) 24 - 44 %    Monocytes 12 (H) 2 - 11 %    Eosinophils % 5 (H) 1 - 4 %    Basophils 1 0 - 2 %    Immature Granulocytes NOT REPORTED 0 %    Segs Absolute 3.30 1.8 - 7.7 k/uL    Absolute Lymph # 0.50 (L) 1.0 - 4.8 k/uL    Absolute Mono # 0.50 0.1 - 1.2 k/uL    Absolute Eos # 0.20 0.0 - 0.4 k/uL    Basophils Absolute 0.00 0.0 - 0.2 k/uL    Absolute Immature Granulocyte NOT REPORTED 0.00 - 0.30 k/uL    WBC Morphology NOT REPORTED     RBC Morphology NOT REPORTED Platelet Estimate NOT REPORTED    Troponin   Result Value Ref Range    Troponin, High Sensitivity 13 0 - 22 ng/L    Troponin T NOT REPORTED <0.03 ng/mL    Troponin Interp NOT REPORTED    Urinalysis with Microscopic   Result Value Ref Range    Color, UA YELLOW YELLOW    Turbidity UA CLEAR CLEAR    Glucose, Ur NEGATIVE NEGATIVE    Bilirubin Urine NEGATIVE NEGATIVE    Ketones, Urine NEGATIVE NEGATIVE    Specific Gravity, UA 1.010 1.005 - 1.030    Urine Hgb NEGATIVE NEGATIVE    pH, UA 6.5 5.0 - 8.0    Protein, UA NEGATIVE NEGATIVE    Urobilinogen, Urine Normal Normal    Nitrite, Urine NEGATIVE NEGATIVE    Leukocyte Esterase, Urine NEGATIVE NEGATIVE    Urinalysis Comments NOT REPORTED     -          WBC, UA 0 TO 2 0 - 5 /HPF    RBC, UA None 0 - 2 /HPF    Casts UA NOT REPORTED /LPF    Crystals, UA NOT REPORTED None /HPF    Epithelial Cells UA 0 TO 2 0 - 5 /HPF    Renal Epithelial, UA NOT REPORTED 0 /HPF    Bacteria, UA FEW (A) None    Mucus, UA NOT REPORTED None    Trichomonas, UA NOT REPORTED None    Amorphous, UA NOT REPORTED None    Other Observations UA NOT REPORTED NOT REQ.     Yeast, UA NOT REPORTED None   Lipase   Result Value Ref Range    Lipase 11 (L) 13 - 60 U/L   Hepatic Function Panel   Result Value Ref Range    Albumin 4.2 3.5 - 5.2 g/dL    Alkaline Phosphatase 91 40 - 129 U/L    ALT 20 5 - 41 U/L    AST 24 <40 U/L    Total Bilirubin 0.46 0.3 - 1.2 mg/dL    Bilirubin, Direct 0.13 <0.31 mg/dL    Bilirubin, Indirect 0.33 0.00 - 1.00 mg/dL    Total Protein 7.4 6.4 - 8.3 g/dL    Globulin NOT REPORTED 1.5 - 3.8 g/dL    Albumin/Globulin Ratio 1.3 1.0 - 2.5       RADIOLOGY:  XR CHEST PORTABLE   Final Result   No acute cardiopulmonary process             RECENT VITALS:  BP: (!) 183/106, Temp: 97.7 °F (36.5 °C), Pulse: 53, Resp: 9     ED Course     The patient was given the following medications:  Orders Placed This Encounter   Medications    ondansetron (ZOFRAN) injection 4 mg    0.9 % sodium chloride bolus Medical Decision Making           The patient is a 79year old male who presents for evaluation of elevated blood pressure and nausea. He developed chest pain while in the emergency department. His blood pressure has improved. Nausea resolved with zofran and his chest pain improved with Pepcid and NTG. Workup is grossly unremarkable. The patient has been admitted for further evaluation and observation. At time of sign out, the patient is awaiting to be transported upstairs. Disposition     FINAL IMPRESSION    No diagnosis found.       DISPOSITION/PLAN   DISPOSITION        CONDITION ON DISPOSITION:   Stable          (Please note that portions of this note were completed with a voice recognition program.  Efforts were made to edit the dictations but occasionally words are mis-transcribed.)    David Chen DO  Emergency Medicine Physician                 David Chen DO  02/04/21 [id number]

## 2021-02-05 NOTE — ED NOTES
Pt is c/o CP, near his left breast area, sharp 2/10 in intensity. Denies any aggravating/alleviating factors. Dr. Jordon Toledo made aware, second EKG and trop to be completed.      Marcus Fritz RN  02/04/21 [id number]

## 2021-02-05 NOTE — PROGRESS NOTES
LEXISCAN STRESS TEST COMPLETED; After Injection PATIENT FELT flushed faced and some throat burning;    PO CAFFEINE GIVEN AFTER 2 MINUTE;  B/P 144/80     HR 64 ;  PATIENT BACK TO BASELINE; Patient GOING TO WAITING ROOM FOR FINAL STRESS SCANS TO FOLLOW

## 2021-02-05 NOTE — H&P
[facility]  Office: [address], DO, Marlamaged Elias, DO, Jere Stamp, DO, Silvia Esposito Blood, DO, Willow Justice MD, Cecily Schlatter, MD, Chance Martinez MD, Paradise Jordan MD, Galina Orozco MD, Jas Mirza MD, Tamica Falcon MD, Edin Baires MD, Christos Wild MD, Kj Dorsey DO, Timoteo Strickland MD, Arslan Elizabeth MD, Miguelina Ramos, DO, Brody Mcfadden MD,  Shanna Pino DO, Rishi Martinez MD, Divina Christina MD, Onelia Celeste CNP, [facility], CNP, Prabhjot Flores, CNP, Sina Beasley, [facility], You Chandler, CNP, Matthias Bolton, CNP, Christophe Mane, CNP, Kayla Howell, CNP, Lanny Mandujano, CNP, Radames Jacobson PA-C, Katherine Sweeney, [facility], Viki Taylor, CNP, Aleks White, CNP, Gilberto Wick, CNP, Cuba Sesay, CNP, Philipp Miranda, CNP         [facility]   [address] [facility]    HISTORY AND PHYSICAL EXAMINATION            Date:   2/5/2021  Patient name:  Earnesteen Angelucci  Date of admission:  2/4/2021  4:51 PM  MRN:   [id number]  Account:  [de-identified]  YOB: 1950  PCP:    Sonido Palomares PA-C  Room:   [facility]/327-  Code Status:    Full Code    Chief Complaint:     Chief Complaint   Patient presents with    Hypertension    Dizziness    Nausea       History Obtained From:     patient    History of Present Illness:     Earnesteen Angelucci is a 79 y.o. Non-/non  male who presents with Hypertension, Dizziness, and Nausea   and is admitted to the hospital for the management of Uncontrolled hypertension. Patient reports he has a long history of hypertension. He has been dealing with hypertension since he was in the ninth grade. 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 He thought he was may be hungry. He then ate some peanuts and drink some water and at that time began to feel nauseous. He had his office nurse take his blood pressure and it read 380R systolic. He uses clonidine to help control his blood pressure. He took a clonidine and then rechecked his blood pressure a short time later and he says his blood pressure was actually higher than before. He says he then took another clonidine and his half dose of lisinopril. His office staff then advised him to go to the ED to be evaluated. He called his wife and she drove him to the ED. While in the ED, he received Zofran which relieved his nausea but then he started having chest pain. He was then given Pepcid which had no effect. He then was given nitro and that resolved his chest pressure. He was admitted for further evaluation of his uncontrolled hypertension and observation. Cardiology was consulted and he underwent cardiac stress test today. Cardiac stress resulted as negative. His blood pressure was reasonably controlled throughout the day today. Cardiology added Aldactone to his home BP medication regimen. He had no further episodes of nausea or chest pain. He was discharged home with plans to follow-up with his PCP and cardiology as outpatient.     Past Medical History:     Past Medical History:   Diagnosis Date    Abdominal pain 11/14/2018    Arthritis     Back pain     Benign essential HTN 06/01/2018    Caffeine use     1 coffee day    Cough 11/15/2018    Elevated CPK 04/05/2013    Elevated PSA measurement 03/23/2018    Frequent PVCs 06/01/2018    Gout     Hyperlipidemia 11/23/2018    Hypertension     Leukocytosis 11/15/2018  Need for prophylactic vaccination and inoculation against varicella 06/01/2018    Osteoarthritis     Osteoarthrosis 11/23/2018    Primary insomnia     Prostate cancer ([facility] [address] 11/04/2018    Rising PSA level 05/10/2018    Sleep apnea         Past Surgical History:     Past Surgical History:   Procedure Laterality Date    CARPAL TUNNEL RELEASE      COLONOSCOPY N/A 11/6/2019    COLONOSCOPY DIAGNOSTIC performed by Nieves August MD at [facility]    CYSTOSCOPY  10/08/2020    bladder fulgration    CYSTOSCOPY      CYSTOSCOPY W BIOPSY OF BLADDER N/A 10/8/2020    CYSTOSCOPY, BLADDER BIOPSY FULGARATION performed by Dianne Angulo MD at [address] [facility]  2012    ID LAP,PROSTATECTOMY,RADICAL,W/NERVE SPARE,INCL ROBOTIC N/A 11/9/2018    ROBOTIC RADICAL PROSTATECTOMY WITH PELVIC LYMPH NODES DISSECTION performed by Dutch Gan MD at [facility]      UPPER GASTROINTESTINAL ENDOSCOPY  11/6/2019    EGD BIOPSY performed by Nieves August MD at [facility]        Medications Prior to Admission:     Prior to Admission medications    Medication Sig Start Date End Date Taking?  Authorizing Provider   flecainide (TAMBOCOR) 100 MG tablet Take 1 tablet by mouth 2 times daily 12/23/20 2/4/21 Yes JHONNY Butler CNP   cloNIDine (CATAPRES) 0.1 MG tablet Take 1 tablet by mouth daily  Patient taking differently: Take 0.1 mg by mouth nightly  12/22/20 2/4/21 Yes Vidhi Dennis PA-C   lisinopril (PRINIVIL;ZESTRIL) 10 MG tablet Takes 1 pill in the am and 2 pills in the PM  Patient taking differently: Take 15 mg by mouth 2 times daily  12/22/20 2/4/21 Yes Vidhi Dennis PA-C   amLODIPine (NORVASC) 5 MG tablet Take 2 tablets by mouth daily  Patient taking differently: 5 mg 2 times daily  12/22/20 2/4/21 Yes Vidhi Dennis PA-C rosuvastatin (CRESTOR) 10 MG tablet Take 1 tablet by mouth nightly 12/22/20 2/4/21 Yes Nataliya Cardona PA-C   allopurinol (ZYLOPRIM) 300 MG tablet TAKE 3/4 TABLET BY MOUTH DAILY  Patient taking differently: TAKE 3/4 TABLET BY MOUTH NIGHTLY 11/30/20  Yes Nataliya Cardona PA-C   meloxicam (MOBIC) 7.5 MG tablet TAKE 1 TABLET BY MOUTH 2 TIMES A DAY 11/30/20  Yes Nataliya Cardona PA-C   Multiple Vitamin (MULTI-VITAMIN) TABS Take 1 tablet by mouth   Yes Historical Provider, MD   oxybutynin (DITROPAN XL) 15 MG extended release tablet Take 15 mg by mouth daily    Historical Provider, MD   nitroGLYCERIN (NITROSTAT) 0.4 MG SL tablet Place 1 tablet under the tongue every 5 minutes as needed for Chest pain 8/27/20   Nataliya Cardona PA-C        Allergies:     Phenothiazines, Sulfa antibiotics, and Prochlorperazine edisylate    Social History:     Tobacco:    reports that he has never smoked. He has never used smokeless tobacco.  Alcohol:      reports current alcohol use of about 2.0 standard drinks of alcohol per week. Drug Use:  reports no history of drug use. Family History:     Family History   Problem Relation Age of Onset    Arrhythmia Mother     Prostate Cancer Father 71    Kidney Cancer Father         62s     Heart Failure Father     Obesity Sister     Hypertension Sister     Cancer Maternal Aunt         d. 48 unknown cancer     Prostate Cancer Paternal Grandfather 76    Obesity Sister     Hypertension Sister        Review of Systems:     Positive and Negative as described in HPI. Review of Systems   Constitutional: Negative. HENT: Positive for tinnitus. Negative for hearing loss. Patient described as \"head buzzing\"   Eyes: Negative. Respiratory: Negative. Cardiovascular: Positive for chest pain and leg swelling. Negative for palpitations. Chest pressure   Gastrointestinal: Positive for nausea. Negative for abdominal pain and vomiting. Genitourinary: Negative. Musculoskeletal: Positive for arthralgias. Negative for gait problem and myalgias. Skin: Negative. Neurological: Negative. Psychiatric/Behavioral: Negative. Physical Exam:   /85   Pulse 65   Temp 98.3 °F (36.8 °C) (Oral)   Resp 16   Ht 5' 7\" (1.702 m)   Wt 226 lb 4.8 oz (102.6 kg)   SpO2 97%   BMI 35.44 kg/m²   Temp (24hrs), Av.9 °F (36.6 °C), Min:97.5 °F (36.4 °C), Max:98.3 °F (36.8 °C)    No results for input(s): POCGLU in the last 72 hours. Intake/Output Summary (Last 24 hours) at 2021 1713  Last data filed at 2021 1543  Gross per 24 hour   Intake 1110 ml   Output 3270 ml   Net -2160 ml       Physical Exam  Vitals signs and nursing note reviewed. Constitutional:       General: He is not in acute distress. Appearance: Normal appearance. He is not toxic-appearing or diaphoretic. HENT:      Head: Normocephalic and atraumatic. Right Ear: External ear normal.      Left Ear: External ear normal.      Nose: Nose normal. No rhinorrhea. Mouth/Throat:      Mouth: Mucous membranes are dry. Eyes:      General: No scleral icterus. Right eye: No discharge. Left eye: No discharge. Pupils: Pupils are equal, round, and reactive to light. Neck:      Musculoskeletal: Normal range of motion. Cardiovascular:      Rate and Rhythm: Normal rate and regular rhythm. Pulses: Normal pulses. Heart sounds: Murmur present. No friction rub. No gallop. Pulmonary:      Effort: Pulmonary effort is normal. No respiratory distress. Breath sounds: No wheezing, rhonchi or rales. Comments: Breath sounds clear, diminished throughout  Abdominal:      General: There is no distension. Palpations: Abdomen is soft. Tenderness: There is no abdominal tenderness. There is no guarding. Hernia: No hernia is present. Comments: Hypoactive bowel sounds   Musculoskeletal: Normal range of motion. Right lower leg: Edema present. Left lower leg: Edema present. Comments: 2+ pitting edema   Skin:     General: Skin is warm and dry. Coloration: Skin is not jaundiced. Findings: No bruising or lesion. Neurological:      General: No focal deficit present. Mental Status: He is alert and oriented to person, place, and time. Psychiatric:         Mood and Affect: Mood normal.         Behavior: Behavior normal.         Thought Content:  Thought content normal.         Judgment: Judgment normal.         Investigations:      Laboratory Testing:  Recent Results (from the past 24 hour(s))   Basic Metabolic Panel    Collection Time: 02/04/21  5:15 PM   Result Value Ref Range    Glucose 111 (H) 70 - 99 mg/dL    BUN 15 8 - 23 mg/dL    CREATININE 0.92 0.70 - 1.20 mg/dL    Bun/Cre Ratio NOT REPORTED 9 - 20    Calcium 9.4 8.6 - 10.4 mg/dL    Sodium 137 135 - 144 mmol/L    Potassium 3.8 3.7 - 5.3 mmol/L    Chloride 104 98 - 107 mmol/L    CO2 23 20 - 31 mmol/L    Anion Gap 10 9 - 17 mmol/L    GFR Non-African American >60 >60 mL/min    GFR African American >60 >60 mL/min    GFR Comment          GFR Staging NOT REPORTED    CBC Auto Differential    Collection Time: 02/04/21  5:15 PM   Result Value Ref Range    WBC 4.6 3.5 - 11.0 k/uL    RBC 4.16 (L) 4.5 - 5.9 m/uL    Hemoglobin 13.2 (L) 13.5 - 17.5 g/dL    Hematocrit 39.2 (L) 41 - 53 %    MCV 94.2 80 - 100 fL    MCH 31.8 26 - 34 pg    MCHC 33.8 31 - 37 g/dL    RDW 13.8 12.5 - 15.4 %    Platelets 904 599 - 594 k/uL    MPV 8.1 6.0 - 12.0 fL    NRBC Automated NOT REPORTED per 100 WBC    Differential Type NOT REPORTED     Seg Neutrophils 71 (H) 36 - 66 %    Lymphocytes 11 (L) 24 - 44 %    Monocytes 12 (H) 2 - 11 %    Eosinophils % 5 (H) 1 - 4 %    Basophils 1 0 - 2 %    Immature Granulocytes NOT REPORTED 0 %    Segs Absolute 3.30 1.8 - 7.7 k/uL    Absolute Lymph # 0.50 (L) 1.0 - 4.8 k/uL    Absolute Mono # 0.50 0.1 - 1.2 k/uL    Absolute Eos # 0.20 0.0 - 0.4 k/uL Basophils Absolute 0.00 0.0 - 0.2 k/uL    Absolute Immature Granulocyte NOT REPORTED 0.00 - 0.30 k/uL    WBC Morphology NOT REPORTED     RBC Morphology NOT REPORTED     Platelet Estimate NOT REPORTED    Troponin    Collection Time: 02/04/21  5:15 PM   Result Value Ref Range    Troponin, High Sensitivity 13 0 - 22 ng/L    Troponin T NOT REPORTED <0.03 ng/mL    Troponin Interp NOT REPORTED    Lipase    Collection Time: 02/04/21  5:15 PM   Result Value Ref Range    Lipase 11 (L) 13 - 60 U/L   Hepatic Function Panel    Collection Time: 02/04/21  5:15 PM   Result Value Ref Range    Albumin 4.2 3.5 - 5.2 g/dL    Alkaline Phosphatase 91 40 - 129 U/L    ALT 20 5 - 41 U/L    AST 24 <40 U/L    Total Bilirubin 0.46 0.3 - 1.2 mg/dL    Bilirubin, Direct 0.13 <0.31 mg/dL    Bilirubin, Indirect 0.33 0.00 - 1.00 mg/dL    Total Protein 7.4 6.4 - 8.3 g/dL    Globulin NOT REPORTED 1.5 - 3.8 g/dL    Albumin/Globulin Ratio 1.3 1.0 - 2.5   Urinalysis with Microscopic    Collection Time: 02/04/21  6:20 PM   Result Value Ref Range    Color, UA YELLOW YELLOW    Turbidity UA CLEAR CLEAR    Glucose, Ur NEGATIVE NEGATIVE    Bilirubin Urine NEGATIVE NEGATIVE    Ketones, Urine NEGATIVE NEGATIVE    Specific Gravity, UA 1.010 1.005 - 1.030    Urine Hgb NEGATIVE NEGATIVE    pH, UA 6.5 5.0 - 8.0    Protein, UA NEGATIVE NEGATIVE    Urobilinogen, Urine Normal Normal    Nitrite, Urine NEGATIVE NEGATIVE    Leukocyte Esterase, Urine NEGATIVE NEGATIVE    Urinalysis Comments NOT REPORTED     -          WBC, UA 0 TO 2 0 - 5 /HPF    RBC, UA None 0 - 2 /HPF    Casts UA NOT REPORTED /LPF    Crystals, UA NOT REPORTED None /HPF    Epithelial Cells UA 0 TO 2 0 - 5 /HPF    Renal Epithelial, UA NOT REPORTED 0 /HPF    Bacteria, UA FEW (A) None    Mucus, UA NOT REPORTED None    Trichomonas, UA NOT REPORTED None    Amorphous, UA NOT REPORTED None    Other Observations UA NOT REPORTED NOT REQ.     Yeast, UA NOT REPORTED None   EKG 12 Lead Lipid panel - fasting    Collection Time: 02/05/21  3:14 AM   Result Value Ref Range    Cholesterol 157 <200 mg/dL    HDL 47 >40 mg/dL    LDL Cholesterol 67 0 - 130 mg/dL    Chol/HDL Ratio 3.3 <5    Triglycerides 214 (H) <150 mg/dL    VLDL NOT REPORTED (H) 1 - 30 mg/dL   CBC    Collection Time: 02/05/21  3:14 AM   Result Value Ref Range    WBC 4.1 3.5 - 11.0 k/uL    RBC 4.15 (L) 4.5 - 5.9 m/uL    Hemoglobin 13.1 (L) 13.5 - 17.5 g/dL    Hematocrit 39.2 (L) 41 - 53 %    MCV 94.6 80 - 100 fL    MCH 31.5 26 - 34 pg    MCHC 33.3 31 - 37 g/dL    RDW 13.8 12.5 - 15.4 %    Platelets 840 246 - 872 k/uL    MPV 7.9 6.0 - 12.0 fL    NRBC Automated NOT REPORTED per 100 WBC   Troponin    Collection Time: 02/05/21  3:14 AM   Result Value Ref Range    Troponin, High Sensitivity 16 0 - 22 ng/L    Troponin T NOT REPORTED <0.03 ng/mL    Troponin Interp NOT REPORTED    EKG 12 lead    Collection Time: 02/05/21  3:19 AM   Result Value Ref Range    Ventricular Rate 49 BPM    Atrial Rate 49 BPM    P-R Interval 192 ms    QRS Duration 156 ms    Q-T Interval 530 ms    QTc Calculation (Bazett) 478 ms    P Axis 46 degrees    R Axis -24 degrees    T Axis 12 degrees       Imaging/Diagnostics:    Xr Chest Portable    Result Date: 2/4/2021  No acute cardiopulmonary process     Nm Cardiac Stress Test Nuclear Imaging    Result Date: 2/5/2021 1. No discrete perfusion abnormality to suggest myocardial ischemia/infarction. 2. No wall motion abnormality. Calculated ejection fraction of 66%. 3. Risk stratification: Low Notes concerning risk stratification: Risk stratification incorporates both clinical history and some testing results. Final risk determination is the responsibility of the ordering provider as other patient information and test results may increase or decrease the risk assessment reported for this examination. Risk stratification criteria are adapted from \"Noninvasive Risk Stratification\" criteria from [facility]. Al, ACC/AATS/AHA/ASE/ASNC/SCAI/SCCT/STS 2017 Appropriate Use Criteria For Coronary Revascularization in Patients With Stable Ischemic Heart Disease [facility] Volume 69, Issue 17, May 2017 High risk (>3% annual death or MI) 1. Severe resting LV dysfunction (LVEF <35%) not readily explained by non coronary causes 2. Resting perfusion abnormalities greater than 10% of the myocardium in patients without prior history or evidence of MI3. Stress-induced perfusion abnormalities encumbering greater than or equal to 10% myocardium or stress segmental scores indicating multiple vascular territories with abnormalities 4. Stress-induced LV dilatation (TID ratio greater than 1.19 for exercise and greater than 1.39 for regadenoson) Intermediate risk (1% to 3% annual death or MI) 1. Mild/moderate resting LV dysfunction (LVEF 35% to 49%) not readily explained by non coronary causes. 2. Resting perfusion abnormalities in 5%-9.9% of the myocardium in patients without a history or prior evidence of MI 3. Stress-induced perfusion abnormality encumbering 5%-9.9% of the myocardium or stress segmental scores indicating 1 vascular territory with abnormalities but without LV dilation 4. Small wall motion abnormality involving 1-2 segments and only 1 coronary bed. Low Risk (Less than 1% annual death or MI) 1.  Normal or small myocardial perfusion defect at rest or with stress encumbering less than 5% of the myocardium. Assessment :      Hospital Problems           Last Modified POA    * (Principal) Uncontrolled hypertension 2/5/2021 Yes    Benign essential HTN (Chronic) 2/5/2021 Yes    High triglycerides 2/5/2021 Yes    Class 2 obesity in adult 2/5/2021 Yes          Plan:     Patient status inpatient in the Med/Surge    1. Uncontrolled hypertension: Cardiology consult. Stress test completed and was unremarkable. Continue antihypertensives as ordered. Aldactone started. Patient advised to stay hydrated. 2. Chronic essential hypertension: See #1. Follow-up with cardiology as recommended. No added salt diet. 3. High triglycerides: Continue statin  4. Obesity: Weight loss management as outpatient per PCP    Discharge home this evening    Consultations:   [address] TO CARDIOLOGY     Patient is admitted as inpatient status because of co-morbidities listed above, severity of signs and symptoms as outlined, requirement for current medical therapies and most importantly because of direct risk to patient if care not provided in a hospital setting. Expected length of stay > 48 hours.     JHONNY Mancuso NP  2/5/2021  5:13 PM    Copy sent to Dr. Waleska Bowman, PA-C

## 2021-02-05 NOTE — PROGRESS NOTES
Pt discharged ambulatory with all belongings, scripts and discharge paperwork. Follow up appointments and discharge instructions reviewed with pt and care giver. All question answered to satisfaction.

## 2021-02-06 ENCOUNTER — CARE COORDINATION (OUTPATIENT)
Dept: CASE MANAGEMENT | Age: 71
End: 2021-02-06

## 2021-02-06 DIAGNOSIS — I10 HYPERTENSION, UNSPECIFIED TYPE: Primary | ICD-10-CM

## 2021-02-06 LAB
EKG ATRIAL RATE: 52 BPM
EKG ATRIAL RATE: 54 BPM
EKG P AXIS: 27 DEGREES
EKG P AXIS: 59 DEGREES
EKG P-R INTERVAL: 190 MS
EKG P-R INTERVAL: 204 MS
EKG Q-T INTERVAL: 502 MS
EKG Q-T INTERVAL: 502 MS
EKG QRS DURATION: 148 MS
EKG QRS DURATION: 154 MS
EKG QTC CALCULATION (BAZETT): 466 MS
EKG QTC CALCULATION (BAZETT): 476 MS
EKG R AXIS: -18 DEGREES
EKG R AXIS: -9 DEGREES
EKG T AXIS: 42 DEGREES
EKG T AXIS: 97 DEGREES
EKG VENTRICULAR RATE: 52 BPM
EKG VENTRICULAR RATE: 54 BPM

## 2021-02-06 NOTE — CARE COORDINATION
[facility] Transitions Initial Follow Up Call    Call within 2 business days of discharge: Yes    Patient: Rudi Quintero Patient : 1950   MRN: [id number]  Reason for Admission: Hypertensive Urgency  Discharge Date: 21 RARS: Readmission Risk Score: 12      Last Discharge [facility]       Complaint Diagnosis Description Type Department Provider    21 Hypertension; Dizziness; Nausea Hypertensive urgency . .. ED to Hosp-Admission (Discharged) (ADMITTED) VZ ALBINA MS Adeline Corbin MD; Katerina Arrington. .. Spoke with: Patient    Facility: [facility]    Non-face-to-face services provided:  Scheduled appointment with  with PCP  Scheduled appointment with Specialist- with cardiology  Obtained and reviewed discharge summary and/or continuity of care documents     Spoke with patient who is feeling good today. He denies any chest pains, shortness of breath, cough, swelling or other cardiac related issues. His blood pressure is 135-140/80-90 and he is feeling well. Medications reviewed, 1111 F  done. Follow up appointments are scheduled. Denies any viral symptoms or COVID exposure. Challenges to be reviewed by the provider   Additional needs identified to be addressed with provider No  none    Discussed COVID-19 related testing which was available at this time. Test results were negative. Patient informed of results, if available? Yes         Method of communication with provider : none    Advance Care Planning:   Does patient have an Advance Directive:  reviewed and current. Was this a readmission? No  Patient stated reason for admission: chest pain    Patients top risk factors for readmission: medical condition    Care Transition Nurse (CTN) contacted the patient by telephone to perform post hospital discharge assessment. Verified name and  with patient as identifiers. Provided introduction to self, and explanation of the CTN role.      CTN reviewed discharge instructions, medical action plan and red flags with patient who verbalized understanding. Patient given an opportunity to ask questions and does not have any further questions or concerns at this time. Were discharge instructions available to patient? Yes. Reviewed appropriate site of care based on symptoms and resources available to patient including: PCP and Specialist. The patient agrees to contact the PCP office for questions related to their healthcare. Medication reconciliation was performed with patient, who verbalizes understanding of administration of home medications. Advised obtaining a 90-day supply of all daily and as-needed medications. Covid Risk Education    Patient has following risk factors of: HTN. Education provided regarding infection prevention, and signs and symptoms of COVID-19 and when to seek medical attention with patient who verbalized understanding. Discussed exposure protocols and quarantine From CDC: Are you at higher risk for severe illness?   and given an opportunity for questions and concerns. The patient agrees to contact the COVID-19 hotline [phone] or PCP office for questions related to COVID-19. For more information on steps you can take to protect yourself, see CDC's How to Protect Yourself     Patient/family/caregiver given information for GetWell Loop and agrees to enroll no  Patient's preferred e-mail: declines  Patient's preferred phone number: declines    Discussed follow-up appointments. If no appointment was previously scheduled, appointment scheduling offered: Yes. Is follow up appointment scheduled within 7 days of discharge? No  Non-[facility] follow up appointment(s): 2/18 with PCP, 2/25 with cardiology    Plan for follow-up call in 7-10 days based on severity of symptoms and risk factors. Plan for next call: Routine follow up  CTN provided contact information for future needs.           Care Transitions 24 Hour Call    Schedule Follow Up Appointment

## 2021-02-11 ENCOUNTER — CARE COORDINATION (OUTPATIENT)
Dept: OTHER | Facility: CLINIC | Age: 71
End: 2021-02-11

## 2021-02-11 NOTE — CARE COORDINATION
ACM attempted to reach patient for follow up call regarding CT. HIPAA compliant message left requesting a return phone call at patients convenience. Will continue to follow. Quin Tilley [facility]  Associate Care Manager  [phone] [facility])  [email or web address]

## 2021-02-11 NOTE — CARE COORDINATION
[facility] Transitions Follow Up Call    2021    Patient: Rudi Quintero  Patient : 1950   MRN: [id number]  Reason for Admission: Chest Pain  Discharge Date: 21 RARS: Readmission Risk Score: 12       Patient returning call. Reports he is feeling much better, denies chest pain, dizziness, SOB, or swelling. States BP's are normal, no concerns. Follow Up  Future Appointments   Date Time Provider Carol Stephenson   2021  1:00 PM Danelle Valdez [address] [place] [address]   3/24/2021  2:00 PM Cas Landrum MD [facility]   3/25/2021 11:20 AM Danelle Valdez PA-C [facility]       No further outreach scheduled with this ACM, ACM will sign off care team at this time. Episode of Care resolved. Patient has this ACM's contact information if future needs arise. [facility]  Associate Care Manager  [phone] [facility])  [email or web address]

## 2021-02-18 ENCOUNTER — OFFICE VISIT (OUTPATIENT)
Dept: PRIMARY CARE CLINIC | Age: 71
End: 2021-02-18
Payer: COMMERCIAL

## 2021-02-18 DIAGNOSIS — R42 DIZZINESS: ICD-10-CM

## 2021-02-18 DIAGNOSIS — I10 ESSENTIAL HYPERTENSION: Primary | ICD-10-CM

## 2021-02-18 DIAGNOSIS — R07.9 CHEST PAIN, UNSPECIFIED TYPE: ICD-10-CM

## 2021-02-18 PROCEDURE — 99214 OFFICE O/P EST MOD 30 MIN: CPT | Performed by: PHYSICIAN ASSISTANT

## 2021-02-18 ASSESSMENT — ENCOUNTER SYMPTOMS
VOMITING: 0
NAUSEA: 0

## 2021-02-18 NOTE — PROGRESS NOTES
is a 79 y.o. male evaluated via telephone on 2021. Consent:  He and/or health care decision maker is aware that that he may receive a bill for this telephone service, depending on his insurance coverage, and has provided verbal consent to proceed: Yes      Documentation:  I communicated with the patient and/or health care decision maker about HTN. Details of this discussion including any medical advice provided: Below      I affirm this is a Patient Initiated Episode with a Patient who has not had a related appointment within my department in the past 7 days or scheduled within the next 24 hours. Patient identification was verified at the start of the visit: Yes    Total Time: minutes: 21-30 minutes    Note: not billable if this call serves to triage the patient into an appointment for the relevant concern      Dolly Gomez                 2021    TELEHEALTH EVALUATION -- Audio/Visual (During XCECW-00 public health emergency)    Patient and physician are located in their individual homes    HPI:     (:  1950) has requested an audio only/video evaluation for the following concern(s):    Patient is here for virtual visit via telephone for follow-up of high blood pressure. Patient states he is compliant with medications. Patient was seen in the ER for chest pain, dizziness, blood pressure was elevated when evaluated, patient confirmed \"blood pressure was elevated at his office\" when checked. Patient was admitted for observation, had stress test completed that was negative. Patient was started on Aldactone. Patient symptoms improved and he was discharged. Patient denies any chest pain episode or dizziness since discharge. Labs reviewed, informed patient labs will be ordered and to have completed before follow-up appointment, patient voiced understanding. Health maintenance reviewed. Medications reviewed.     HPI    Review of Systems rosuvastatin (CRESTOR) 10 MG tablet Take 1 tablet by mouth nightly  Liudmila Aleman PA-C       Social History     Tobacco Use    Smoking status: Never Smoker    Smokeless tobacco: Never Used   Substance Use Topics    Alcohol use: Yes     Alcohol/week: 2.0 standard drinks     Types: 2 Cans of beer per week     Frequency: 4 or more times a week     Drinks per session: 1 or 2    Drug use: Never        Past Medical History:   Diagnosis Date    Abdominal pain 11/14/2018    Arthritis     Back pain     Benign essential HTN 06/01/2018    Caffeine use     1 coffee day    Cough 11/15/2018    Elevated CPK 04/05/2013    Elevated PSA measurement 03/23/2018    Frequent PVCs 06/01/2018    Gout     Hyperlipidemia 11/23/2018    Hypertension     Leukocytosis 11/15/2018    Need for prophylactic vaccination and inoculation against varicella 06/01/2018    Osteoarthritis     Osteoarthrosis 11/23/2018    Primary insomnia     Prostate cancer ([facility] [address] 11/04/2018    Rising PSA level 05/10/2018    Sleep apnea             CBC:  Lab Results   Component Value Date    WBC 4.1 02/05/2021    HGB 13.1 02/05/2021     02/05/2021       BMP:    Lab Results   Component Value Date     02/05/2021    K 3.9 02/05/2021    K 3.5 11/17/2018     02/05/2021    CO2 23 02/05/2021    BUN 12 02/05/2021    CREATININE 0.90 02/05/2021    GLUCOSE 101 02/05/2021       HEMOGLOBIN A1C:   Lab Results   Component Value Date    LABA1C 5.2 09/05/2019       FASTING LIPID PANEL:  Lab Results   Component Value Date    CHOL 157 02/05/2021    HDL 47 02/05/2021    TRIG 214 (H) 02/05/2021         RECORD REVIEW: Previous medical records were reviewed at today's visit. PHYSICAL EXAMINATION:    Vital Signs: (As obtained by patient/caregiver at home)    No flowsheet data found. Physical Exam  Constitutional:       General: He is awake. Neurological:      Mental Status: He is alert and oriented to person, place, and time. Psychiatric:         Speech: Speech normal.         Behavior: Behavior is cooperative. Thought Content: Thought content normal.         Cognition and Memory: Cognition normal.         Judgment: Judgment normal.           Other pertinent observable physical exam findings:-     RECORD REVIEW: Previous medical records were reviewed at today's visit. The past family, medical and social histories were reviewed and unchanged with the exceptions of what is mentioned in this note. Due to this being a TeleHealth encounter, evaluation of the following organ systems is limited: Vitals/Constitutional/EENT/Resp/CV/GI//MS/Neuro/Skin/Heme-Lymph-Imm. ASSESSMENT/PLAN:  Albino Bahena was seen today for follow-up from hospital.    Diagnoses and all orders for this visit:    Essential hypertension  -     Basic Metabolic Panel; Future    Chest pain, unspecified type    Dizziness        Return if symptoms worsen or fail to improve. An  electronic signature was used to authenticate this note. --Bianka Paniagua PA-C on 2/24/2021 at 6:50 PM    This note is created with the assistance of a speech-recognition program. While intending to generate a document that actually reflects the content of the visit, the document can still have some mistakes which may not have been identified and corrected by editing. 9    Pursuant to the emergency declaration under the [facility] waiver authority and the [employer] Act, this Virtual  Visit was conducted, with patient's consent, to reduce the patient's risk of exposure to COVID-19 and provide continuity of care for an established patient. Services were provided through a video synchronous discussion virtually to substitute for in-person clinic visit.

## 2021-02-18 NOTE — PROGRESS NOTES
Visit Information    Have you changed or started any medications since your last visit including any over-the-counter medicines, vitamins, or herbal medicines? no   Are you having any side effects from any of your medications? -  no  Have you stopped taking any of your medications? Is so, why? -  no    Have you seen any other physician or provider since your last visit? No  Have you had any other diagnostic tests since your last visit? No  Have you been seen in the emergency room and/or had an admission to a hospital since we last saw you? No  Have you had your routine dental cleaning in the past 6 months? no    Have you activated your [employer] account? If not, what are your barriers?  Yes     Patient Care Team:  Barbara Estrada PA-C as PCP - General (Physician Assistant)  Barbara Estrada PA-C as PCP - [facility]  Curt Anthony MD as Consulting Physician (Infectious Diseases)  Trey Wilson RN as Nurse Navigator (Oncology)  Darien Myles MD as Consulting Physician (Urology)  Leo Cao MD as Consulting Physician (Cardiology)    Medical History Review  Past Medical, Family, and Social History reviewed and does contribute to the patient presenting condition    Health Maintenance   Topic Date Due    PSA counseling  09/16/2021    Potassium monitoring  02/05/2022    Creatinine monitoring  02/05/2022    Diabetes screen  09/05/2022    Lipid screen  02/05/2026    DTaP/Tdap/Td vaccine (2 - Td) 05/10/2027    Colon cancer screen colonoscopy  11/06/2029    Flu vaccine  Completed    Shingles Vaccine  Completed    Pneumococcal 65+ years Vaccine  Completed    COVID-19 Vaccine  Completed    Hepatitis C screen  Completed    Hepatitis A vaccine  Aged Out    Hepatitis B vaccine  Aged Out    Hib vaccine  Aged Out    Meningococcal (ACWY) vaccine  Aged Out

## 2021-02-23 RX ORDER — SPIRONOLACTONE 25 MG/1
25 TABLET ORAL DAILY
Qty: 90 TABLET | Refills: 0 | Status: SHIPPED | OUTPATIENT
Start: 2021-02-23 | End: 2021-02-25 | Stop reason: SDUPTHER

## 2021-02-23 NOTE — TELEPHONE ENCOUNTER
Patient is requesting a 90 day supply of this medication, pharmacy updated in chart.     Health Maintenance   Topic Date Due    PSA counseling  09/16/2021    Potassium monitoring  02/05/2022    Creatinine monitoring  02/05/2022    Diabetes screen  09/05/2022    Lipid screen  02/05/2026    DTaP/Tdap/Td vaccine (2 - Td) 05/10/2027    Colon cancer screen colonoscopy  11/06/2029    Flu vaccine  Completed    Shingles Vaccine  Completed    Pneumococcal 65+ years Vaccine  Completed    COVID-19 Vaccine  Completed    Hepatitis C screen  Completed    Hepatitis A vaccine  Aged Out    Hepatitis B vaccine  Aged Out    Hib vaccine  Aged Out    Meningococcal (ACWY) vaccine  Aged Out             (applicable per patient's age: Cancer Screenings, Depression Screening, Fall Risk Screening, Immunizations)    Hemoglobin A1C (%)   Date Value   09/05/2019 5.2   03/26/2019 5.7     LDL Cholesterol (mg/dL)   Date Value   02/05/2021 67     AST (U/L)   Date Value   02/05/2021 22     ALT (U/L)   Date Value   02/05/2021 18     BUN (mg/dL)   Date Value   02/05/2021 12      (goal A1C is < 7)   (goal LDL is <100) need 30-50% reduction from baseline     BP Readings from Last 3 Encounters:   02/05/21 129/85   10/15/20 132/86   10/08/20 136/83    (goal /80)      All Future Testing planned in [facility]:  Lab Frequency Next Occurrence   PSA, Diagnostic Once 03/22/2021   PSA, Diagnostic Once 12/90/3034   Basic Metabolic Panel Once 06/52/4114   PSA, Diagnostic         Next Visit Date:  Future Appointments   Date Time Provider Carol Stephenson   3/24/2021  2:00 PM Panchito Carter MD [facility]   3/25/2021 11:20 AM Maria Eugenia Wright PA-C [address]            Patient Active Problem List:     Benign essential HTN     Asymptomatic premature ventricular contractions     Primary insomnia     Prostate cancer ([facility] [address]     Gout     High triglycerides     Hypertension     Osteoarthrosis     Atrial flutter ([address] Hypokalemia     Atrial flutter with rapid ventricular response (HCC)     Frequency of micturition     Urge incontinence     History of prostate cancer     Gross hematuria     Renal cyst, right     Radiation cystitis     Class 2 obesity in adult

## 2021-02-24 ENCOUNTER — HOSPITAL ENCOUNTER (OUTPATIENT)
Age: 71
Discharge: HOME | End: 2021-02-24
Payer: COMMERCIAL

## 2021-02-24 DIAGNOSIS — I10 ESSENTIAL HYPERTENSION: ICD-10-CM

## 2021-02-24 DIAGNOSIS — Z85.46 HISTORY OF PROSTATE CANCER: ICD-10-CM

## 2021-02-24 LAB
ANION GAP SERPL CALCULATED.3IONS-SCNC: 13 MMOL/L (ref 9–17)
BUN BLDV-MCNC: 18 MG/DL (ref 8–23)
BUN/CREAT BLD: NORMAL (ref 9–20)
CALCIUM SERPL-MCNC: 9.9 MG/DL (ref 8.6–10.4)
CHLORIDE BLD-SCNC: 101 MMOL/L (ref 98–107)
CO2: 25 MMOL/L (ref 20–31)
CREAT SERPL-MCNC: 1.04 MG/DL (ref 0.7–1.2)
GFR AFRICAN AMERICAN: >60 ML/MIN
GFR NON-AFRICAN AMERICAN: >60 ML/MIN
GFR SERPL CREATININE-BSD FRML MDRD: NORMAL ML/MIN/{1.73_M2}
GFR SERPL CREATININE-BSD FRML MDRD: NORMAL ML/MIN/{1.73_M2}
GLUCOSE BLD-MCNC: 92 MG/DL (ref 70–99)
POTASSIUM SERPL-SCNC: 4.2 MMOL/L (ref 3.7–5.3)
PROSTATE SPECIFIC ANTIGEN: <0.01 UG/L
SODIUM BLD-SCNC: 139 MMOL/L (ref 135–144)

## 2021-02-24 PROCEDURE — 80048 BASIC METABOLIC PNL TOTAL CA: CPT

## 2021-02-24 PROCEDURE — 36415 COLL VENOUS BLD VENIPUNCTURE: CPT

## 2021-02-24 PROCEDURE — 84153 ASSAY OF PSA TOTAL: CPT

## 2021-02-24 ASSESSMENT — ENCOUNTER SYMPTOMS
SHORTNESS OF BREATH: 0
CONSTIPATION: 0
COUGH: 0
WHEEZING: 0
DIARRHEA: 0
BACK PAIN: 0

## 2021-02-25 RX ORDER — SPIRONOLACTONE 25 MG/1
25 TABLET ORAL DAILY
Qty: 90 TABLET | Refills: 0 | Status: SHIPPED | OUTPATIENT
Start: 2021-02-25 | End: 2021-03-25 | Stop reason: SDUPTHER

## 2021-02-25 NOTE — TELEPHONE ENCOUNTER
Patient would like to have prescription for spironolactone 25 mg sent to [address] [place] [address] mail order pharmacy, information has been updated in chart.

## 2021-03-22 DIAGNOSIS — I10 ESSENTIAL HYPERTENSION: ICD-10-CM

## 2021-03-22 RX ORDER — LISINOPRIL 10 MG/1
TABLET ORAL
Qty: 90 TABLET | Refills: 0 | Status: CANCELLED | OUTPATIENT
Start: 2021-03-22 | End: 2021-05-19

## 2021-03-22 NOTE — TELEPHONE ENCOUNTER
Patient is calling for a refill on lisinopril 10 mg. Patient is taking 1 1/2 tabs in the morning and 2 tabs in the evening. A total of 3 1/2 a day. He would like this I a 90 day supply and send to [employer] order.

## 2021-03-24 PROBLEM — N32.81 OAB (OVERACTIVE BLADDER): Status: ACTIVE | Noted: 2021-03-24

## 2021-03-25 ENCOUNTER — OFFICE VISIT (OUTPATIENT)
Dept: PRIMARY CARE CLINIC | Age: 71
End: 2021-03-25
Payer: COMMERCIAL

## 2021-03-25 VITALS
DIASTOLIC BLOOD PRESSURE: 73 MMHG | WEIGHT: 234.8 LBS | OXYGEN SATURATION: 95 % | TEMPERATURE: 97.6 F | HEART RATE: 62 BPM | SYSTOLIC BLOOD PRESSURE: 102 MMHG | BODY MASS INDEX: 36.77 KG/M2

## 2021-03-25 DIAGNOSIS — Z76.0 MEDICATION REFILL: ICD-10-CM

## 2021-03-25 DIAGNOSIS — Z00.00 ANNUAL PHYSICAL EXAM: Primary | ICD-10-CM

## 2021-03-25 DIAGNOSIS — N32.81 OAB (OVERACTIVE BLADDER): ICD-10-CM

## 2021-03-25 DIAGNOSIS — M25.50 ARTHRALGIA OF MULTIPLE JOINTS: ICD-10-CM

## 2021-03-25 DIAGNOSIS — F51.01 PRIMARY INSOMNIA: ICD-10-CM

## 2021-03-25 DIAGNOSIS — E78.00 PURE HYPERCHOLESTEROLEMIA: ICD-10-CM

## 2021-03-25 DIAGNOSIS — I10 ESSENTIAL HYPERTENSION: ICD-10-CM

## 2021-03-25 DIAGNOSIS — E79.0 HYPERURICEMIA: ICD-10-CM

## 2021-03-25 DIAGNOSIS — E66.01 CLASS 2 SEVERE OBESITY DUE TO EXCESS CALORIES WITH SERIOUS COMORBIDITY AND BODY MASS INDEX (BMI) OF 36.0 TO 36.9 IN ADULT (HCC): ICD-10-CM

## 2021-03-25 PROCEDURE — 99397 PER PM REEVAL EST PAT 65+ YR: CPT | Performed by: PHYSICIAN ASSISTANT

## 2021-03-25 RX ORDER — LISINOPRIL 10 MG/1
TABLET ORAL
Qty: 315 TABLET | Refills: 0 | Status: SHIPPED | OUTPATIENT
Start: 2021-03-25 | End: 2021-06-04 | Stop reason: SDUPTHER

## 2021-03-25 RX ORDER — MELOXICAM 7.5 MG/1
7.5 TABLET ORAL 2 TIMES DAILY
Qty: 180 TABLET | Refills: 0 | Status: SHIPPED | OUTPATIENT
Start: 2021-03-25 | End: 2021-09-15 | Stop reason: SDUPTHER

## 2021-03-25 RX ORDER — CLONIDINE HYDROCHLORIDE 0.1 MG/1
0.1 TABLET ORAL DAILY
Qty: 90 TABLET | Refills: 1 | Status: SHIPPED | OUTPATIENT
Start: 2021-03-25 | End: 2021-06-04 | Stop reason: SDUPTHER

## 2021-03-25 RX ORDER — ROSUVASTATIN CALCIUM 10 MG/1
10 TABLET, COATED ORAL NIGHTLY
Qty: 90 TABLET | Refills: 1 | Status: SHIPPED | OUTPATIENT
Start: 2021-03-25 | End: 2021-12-08 | Stop reason: SDUPTHER

## 2021-03-25 RX ORDER — ALLOPURINOL 300 MG/1
TABLET ORAL
Qty: 90 TABLET | Refills: 0 | Status: SHIPPED | OUTPATIENT
Start: 2021-03-25 | End: 2021-09-15 | Stop reason: SDUPTHER

## 2021-03-25 RX ORDER — SPIRONOLACTONE 25 MG/1
25 TABLET ORAL DAILY
Qty: 90 TABLET | Refills: 0 | Status: SHIPPED | OUTPATIENT
Start: 2021-03-25 | End: 2021-09-15 | Stop reason: SDUPTHER

## 2021-03-25 RX ORDER — AMLODIPINE BESYLATE 5 MG/1
10 TABLET ORAL DAILY
Qty: 180 TABLET | Refills: 0 | Status: SHIPPED | OUTPATIENT
Start: 2021-03-25 | End: 2021-09-15 | Stop reason: SDUPTHER

## 2021-03-25 ASSESSMENT — ENCOUNTER SYMPTOMS
CONSTIPATION: 1
BLOOD IN STOOL: 1

## 2021-03-25 ASSESSMENT — PATIENT HEALTH QUESTIONNAIRE - PHQ9: 1. LITTLE INTEREST OR PLEASURE IN DOING THINGS: 0

## 2021-03-25 NOTE — PROGRESS NOTES
referred to another primary care patient, patient voiced understanding. HPI    Patient Active Problem List   Diagnosis    Asymptomatic premature ventricular contractions    Primary insomnia    Prostate cancer ([facility] [address]    Gout    High triglycerides    Hypertension    Osteoarthrosis    Hypokalemia    Atrial flutter with rapid ventricular response (HCC)    Frequency of micturition    Urge incontinence    History of prostate cancer    Gross hematuria    Renal cyst, right    Radiation cystitis    Class 2 obesity in adult    OAB (overactive bladder)       There are no preventive care reminders to display for this patient. Allergies   Allergen Reactions    Phenothiazines Other (See Comments)     Neuromotor reaction called a phenothiazine reaction    Sulfa Antibiotics Other (See Comments)    Prochlorperazine Edisylate Other (See Comments)     Lose control         Current Outpatient Medications   Medication Sig Dispense Refill    spironolactone (ALDACTONE) 25 MG tablet Take 1 tablet by mouth daily 90 tablet 0    lisinopril (PRINIVIL;ZESTRIL) 10 MG tablet Takes 15 mg in morning, 20 mg in the evening 315 tablet 0    amLODIPine (NORVASC) 5 MG tablet Take 2 tablets by mouth daily 180 tablet 0    rosuvastatin (CRESTOR) 10 MG tablet Take 1 tablet by mouth nightly 90 tablet 1    allopurinol (ZYLOPRIM) 300 MG tablet TAKE 3/4 TABLET BY MOUTH DAILY 90 tablet 0    meloxicam (MOBIC) 7.5 MG tablet Take 1 tablet by mouth 2 times daily with food 180 tablet 0    cloNIDine (CATAPRES) 0.1 MG tablet Take 1 tablet by mouth daily 90 tablet 1    zolpidem (AMBIEN) 5 MG tablet Take 1.5 tablets by mouth nightly as needed for Sleep for up to 90 days.  135 tablet 0    oxybutynin (DITROPAN XL) 15 MG extended release tablet Take 1 tablet by mouth daily 30 tablet 2    aspirin 81 MG EC tablet Take 1 tablet by mouth daily 30 tablet 3    flecainide (TAMBOCOR) 100 MG tablet Take 1 tablet by mouth 2 times daily 60 tablet 0    nitroGLYCERIN (NITROSTAT) 0.4 MG SL tablet Place 1 tablet under the tongue every 5 minutes as needed for Chest pain 90 tablet 1    Multiple Vitamin (MULTI-VITAMIN) TABS Take 1 tablet by mouth       No current facility-administered medications for this visit. Social History     Tobacco Use    Smoking status: Never Smoker    Smokeless tobacco: Never Used   Substance Use Topics    Alcohol use: Yes     Alcohol/week: 2.0 standard drinks     Types: 2 Cans of beer per week     Frequency: 4 or more times a week     Drinks per session: 1 or 2    Drug use: Never       Family History   Problem Relation Age of Onset    Arrhythmia Mother     Prostate Cancer Father 71    Kidney Cancer Father         62s     Heart Failure Father     Obesity Sister     Hypertension Sister     Cancer Maternal Aunt         d. 48 unknown cancer     Prostate Cancer Paternal Grandfather 76    Obesity Sister     Hypertension Sister         REVIEW OFSYSTEMS:  Review of Systems   Constitutional: Negative for chills and fever. HENT: Negative for congestion, hearing loss, rhinorrhea and sore throat. Eyes: Negative for pain and visual disturbance. Respiratory: Negative for cough, shortness of breath and wheezing. Cardiovascular: Negative for chest pain. Gastrointestinal: Positive for blood in stool (d/t hx radiation) and constipation (d/t med). Negative for abdominal pain, diarrhea, nausea and vomiting. Genitourinary: Negative for difficulty urinating, dysuria, frequency and urgency. Musculoskeletal: Negative for arthralgias, back pain and myalgias. Neurological: Negative for dizziness and headaches.        PHYSICAL EXAM:  Vitals:    03/25/21 1118   BP: 102/73   Site: Right Upper Arm   Position: Sitting   Cuff Size: Large Adult   Pulse: 62   Temp: 97.6 °F (36.4 °C)   TempSrc: Temporal   SpO2: 95%   Weight: 234 lb 12.8 oz (106.5 kg)     BP Readings from Last 3 Encounters:   03/25/21 102/73   03/24/21 138/75   02/05/21 129/85        Physical Exam  Vitals signs reviewed. Constitutional:       Appearance: Normal appearance. He is well-developed and well-groomed. He is obese. HENT:      Head: Normocephalic and atraumatic. Right Ear: Hearing and external ear normal.      Left Ear: Hearing and external ear normal.      Nose: Nose normal.      Mouth/Throat:      Pharynx: Uvula midline. Eyes:      General: Lids are normal.      Conjunctiva/sclera: Conjunctivae normal.      Pupils: Pupils are equal, round, and reactive to light. Cardiovascular:      Rate and Rhythm: Normal rate and regular rhythm. Heart sounds: Normal heart sounds. Pulmonary:      Effort: Pulmonary effort is normal.      Breath sounds: Normal breath sounds. Abdominal:      Palpations: Abdomen is soft. Tenderness: There is no abdominal tenderness. Musculoskeletal: Normal range of motion. General: No tenderness or deformity. Skin:     General: Skin is warm and dry. Neurological:      Mental Status: He is alert and oriented to person, place, and time. Psychiatric:         Speech: Speech normal.         Behavior: Behavior normal. Behavior is cooperative. Thought Content:  Thought content normal.         Judgment: Judgment normal.           DIAGNOSTIC FINDINGS:  CBC:  Lab Results   Component Value Date    WBC 4.1 02/05/2021    HGB 13.1 02/05/2021     02/05/2021       BMP:    Lab Results   Component Value Date     02/24/2021    K 4.2 02/24/2021    K 3.5 11/17/2018     02/24/2021    CO2 25 02/24/2021    BUN 18 02/24/2021    CREATININE 1.04 02/24/2021    GLUCOSE 92 02/24/2021       HEMOGLOBIN A1C:   Lab Results   Component Value Date    LABA1C 5.2 09/05/2019       FASTING LIPID PANEL:  Lab Results   Component Value Date    CHOL 157 02/05/2021    HDL 47 02/05/2021    TRIG 214 (H) 02/05/2021       ASSESSMENT AND PLAN:  Tomasa Webb was seen today for 6 month follow-up and annual exam.    Diagnoses and all orders for this visit:    Annual physical exam    OAB (overactive bladder)    Class 2 severe obesity due to excess calories with serious comorbidity and body mass index (BMI) of 36.0 to 36.9 in adult [facility])    Essential hypertension  -     spironolactone (ALDACTONE) 25 MG tablet; Take 1 tablet by mouth daily  -     lisinopril (PRINIVIL;ZESTRIL) 10 MG tablet; Takes 15 mg in morning, 20 mg in the evening  -     amLODIPine (NORVASC) 5 MG tablet; Take 2 tablets by mouth daily  -     cloNIDine (CATAPRES) 0.1 MG tablet; Take 1 tablet by mouth daily    Hyperuricemia  -     allopurinol (ZYLOPRIM) 300 MG tablet; TAKE 3/4 TABLET BY MOUTH DAILY    Pure hypercholesterolemia  -     rosuvastatin (CRESTOR) 10 MG tablet; Take 1 tablet by mouth nightly    Arthralgia of multiple joints  -     meloxicam (MOBIC) 7.5 MG tablet; Take 1 tablet by mouth 2 times daily with food    Primary insomnia    Medication refill  -     spironolactone (ALDACTONE) 25 MG tablet; Take 1 tablet by mouth daily  -     lisinopril (PRINIVIL;ZESTRIL) 10 MG tablet; Takes 15 mg in morning, 20 mg in the evening  -     amLODIPine (NORVASC) 5 MG tablet; Take 2 tablets by mouth daily  -     rosuvastatin (CRESTOR) 10 MG tablet; Take 1 tablet by mouth nightly  -     allopurinol (ZYLOPRIM) 300 MG tablet; TAKE 3/4 TABLET BY MOUTH DAILY  -     meloxicam (MOBIC) 7.5 MG tablet; Take 1 tablet by mouth 2 times daily with food  -     cloNIDine (CATAPRES) 0.1 MG tablet; Take 1 tablet by mouth daily      FOLLOW UP AND INSTRUCTIONS:  Return if symptoms worsen or fail to improve. · Martha Gurrola received counseling on the following healthy behaviors:nutrition and exercise    · Discussed use, benefit, and side effects of prescribed medications. Barriers to medication compliance addressed. All patient questions answered. Pt voiced understanding.      · Patient given educational materials - see patient instructions    Electronically signed by Sonido Palomares PA-C on 3/25/21 at 11:37 AM EDT    This note is created with the assistance of a speech-recognition program. While intending to generate a document that actually reflects the content of the visit, the document can still have some mistakes which may not have been identified and corrected by editing.

## 2021-03-25 NOTE — PROGRESS NOTES
Visit Information    Have you changed or started any medications since your last visit including any over-the-counter medicines, vitamins, or herbal medicines? yes - multi vitiman ,asprin 80mg    Are you having any side effects from any of your medications? -  no  Have you stopped taking any of your medications? Is so, why? -  no    Have you seen any other physician or provider since your last visit? Yes - Records Obtained  Have you had any other diagnostic tests since your last visit? Yes - Records Obtained  Have you been seen in the emergency room and/or had an admission to a hospital since we last saw you? Yes - Records Obtained  Have you had your routine dental cleaning in the past 6 months? yes -     Have you activated your [employer] account? If not, what are your barriers?  Yes     Patient Care Team:  Lexi Wilson PA-C as PCP - General (Physician Assistant)  Lexi Wilson PA-C as PCP - [facility] Provider  Jaxon Juarez MD as Consulting Physician (Infectious Diseases)  Jackelyn Rivera RN as Nurse Navigator (Oncology)  Sacha Garcia MD as Consulting Physician (Urology)  Uzma Adam MD as Consulting Physician (Cardiology)    Medical History Review  Past Medical, Family, and Social History reviewed and does not contribute to the patient presenting condition    Health Maintenance   Topic Date Due    Lipid screen  02/05/2022    Potassium monitoring  02/24/2022    Creatinine monitoring  02/24/2022    PSA counseling  02/24/2022    DTaP/Tdap/Td vaccine (2 - Td) 05/10/2027    Colon cancer screen colonoscopy  11/06/2029    Flu vaccine  Completed    Shingles Vaccine  Completed    Pneumococcal 65+ years Vaccine  Completed    COVID-19 Vaccine  Completed    Hepatitis C screen  Completed    Hepatitis A vaccine  Aged Out    Hepatitis B vaccine  Aged Out    Hib vaccine  Aged Out    Meningococcal (ACWY) vaccine  Aged Out

## 2021-03-28 ENCOUNTER — TELEPHONE (OUTPATIENT)
Dept: PRIMARY CARE CLINIC | Age: 71
End: 2021-03-28

## 2021-03-28 PROBLEM — I48.92 ATRIAL FLUTTER (HCC): Status: RESOLVED | Noted: 2020-02-25 | Resolved: 2021-03-28

## 2021-03-28 PROBLEM — I10 BENIGN ESSENTIAL HTN: Chronic | Status: RESOLVED | Noted: 2018-06-01 | Resolved: 2021-03-28

## 2021-03-28 ASSESSMENT — ENCOUNTER SYMPTOMS
BACK PAIN: 0
SORE THROAT: 0
RHINORRHEA: 0
SHORTNESS OF BREATH: 0
DIARRHEA: 0
EYE PAIN: 0
WHEEZING: 0
ABDOMINAL PAIN: 0
NAUSEA: 0
COUGH: 0
VOMITING: 0

## 2021-06-04 DIAGNOSIS — F51.01 PRIMARY INSOMNIA: ICD-10-CM

## 2021-06-04 DIAGNOSIS — Z76.0 MEDICATION REFILL: ICD-10-CM

## 2021-06-04 DIAGNOSIS — I10 ESSENTIAL HYPERTENSION: ICD-10-CM

## 2021-06-04 RX ORDER — LISINOPRIL 10 MG/1
TABLET ORAL
Qty: 315 TABLET | Refills: 0 | Status: SHIPPED | OUTPATIENT
Start: 2021-06-04 | End: 2021-09-15 | Stop reason: SDUPTHER

## 2021-06-04 RX ORDER — ZOLPIDEM TARTRATE 5 MG/1
7.5 TABLET ORAL NIGHTLY PRN
Qty: 135 TABLET | Refills: 0 | Status: SHIPPED | OUTPATIENT
Start: 2021-06-04 | End: 2021-09-15 | Stop reason: SDUPTHER

## 2021-06-04 RX ORDER — CLONIDINE HYDROCHLORIDE 0.1 MG/1
0.1 TABLET ORAL DAILY
Qty: 90 TABLET | Refills: 1 | Status: SHIPPED | OUTPATIENT
Start: 2021-06-04 | End: 2021-12-08 | Stop reason: SDUPTHER

## 2021-09-02 DIAGNOSIS — E79.0 HYPERURICEMIA: ICD-10-CM

## 2021-09-02 DIAGNOSIS — M25.50 ARTHRALGIA OF MULTIPLE JOINTS: ICD-10-CM

## 2021-09-02 DIAGNOSIS — Z76.0 MEDICATION REFILL: ICD-10-CM

## 2021-09-02 DIAGNOSIS — I10 ESSENTIAL HYPERTENSION: ICD-10-CM

## 2021-09-02 RX ORDER — MELOXICAM 7.5 MG/1
7.5 TABLET ORAL 2 TIMES DAILY
Qty: 180 TABLET | Refills: 0 | Status: CANCELLED | OUTPATIENT
Start: 2021-09-02

## 2021-09-02 RX ORDER — ALLOPURINOL 300 MG/1
TABLET ORAL
Qty: 90 TABLET | Refills: 0 | Status: CANCELLED | OUTPATIENT
Start: 2021-09-02

## 2021-09-02 RX ORDER — AMLODIPINE BESYLATE 5 MG/1
10 TABLET ORAL DAILY
Qty: 180 TABLET | Refills: 0 | Status: CANCELLED | OUTPATIENT
Start: 2021-09-02

## 2021-09-02 RX ORDER — SPIRONOLACTONE 25 MG/1
25 TABLET ORAL DAILY
Qty: 90 TABLET | Refills: 0 | Status: CANCELLED | OUTPATIENT
Start: 2021-09-02

## 2021-09-02 NOTE — TELEPHONE ENCOUNTER
9/15/21    Hemoglobin A1C (%)   Date Value   09/05/2019 5.2   03/26/2019 5.7             ( goal A1C is < 7)   No results found for: LABMICR  LDL Cholesterol (mg/dL)   Date Value   02/05/2021 67       (goal LDL is <100)   AST (U/L)   Date Value   02/05/2021 22     ALT (U/L)   Date Value   02/05/2021 18     BUN (mg/dL)   Date Value   02/24/2021 18     BP Readings from Last 3 Encounters:   03/25/21 102/73   03/24/21 138/75   02/05/21 129/85          (goal 120/80)        Patient Active Problem List:     Asymptomatic premature ventricular contractions     Primary insomnia     Prostate cancer (HCC)     Gout     High triglycerides     Hypertension     Osteoarthrosis     Hypokalemia     Atrial flutter with rapid ventricular response (HCC)     Frequency of micturition     Urge incontinence     History of prostate cancer     Gross hematuria     Renal cyst, right     Radiation cystitis     Class 2 obesity in adult     OAB (overactive bladder)      ----Lidia Clark

## 2021-09-15 ENCOUNTER — OFFICE VISIT (OUTPATIENT)
Dept: PRIMARY CARE CLINIC | Age: 71
End: 2021-09-15
Payer: COMMERCIAL

## 2021-09-15 ENCOUNTER — HOSPITAL ENCOUNTER (OUTPATIENT)
Age: 71
Discharge: HOME | End: 2021-09-15
Payer: COMMERCIAL

## 2021-09-15 VITALS
OXYGEN SATURATION: 94 % | SYSTOLIC BLOOD PRESSURE: 130 MMHG | BODY MASS INDEX: 36.81 KG/M2 | HEART RATE: 69 BPM | TEMPERATURE: 98.1 F | DIASTOLIC BLOOD PRESSURE: 88 MMHG | WEIGHT: 235 LBS

## 2021-09-15 DIAGNOSIS — I10 ESSENTIAL HYPERTENSION: ICD-10-CM

## 2021-09-15 DIAGNOSIS — E79.0 HYPERURICEMIA: ICD-10-CM

## 2021-09-15 DIAGNOSIS — M25.50 ARTHRALGIA OF MULTIPLE JOINTS: ICD-10-CM

## 2021-09-15 DIAGNOSIS — C61 PROSTATE CANCER (HCC): ICD-10-CM

## 2021-09-15 DIAGNOSIS — Z76.0 MEDICATION REFILL: ICD-10-CM

## 2021-09-15 DIAGNOSIS — F51.01 PRIMARY INSOMNIA: ICD-10-CM

## 2021-09-15 DIAGNOSIS — Z23 NEED FOR IMMUNIZATION AGAINST INFLUENZA: Primary | ICD-10-CM

## 2021-09-15 LAB
ANION GAP SERPL CALCULATED.3IONS-SCNC: 13 MMOL/L (ref 9–17)
BUN BLDV-MCNC: 15 MG/DL (ref 8–23)
BUN/CREAT BLD: NORMAL (ref 9–20)
CALCIUM SERPL-MCNC: 9.3 MG/DL (ref 8.6–10.4)
CHLORIDE BLD-SCNC: 102 MMOL/L (ref 98–107)
CO2: 22 MMOL/L (ref 20–31)
CREAT SERPL-MCNC: 1.01 MG/DL (ref 0.7–1.2)
GFR AFRICAN AMERICAN: >60 ML/MIN
GFR NON-AFRICAN AMERICAN: >60 ML/MIN
GFR SERPL CREATININE-BSD FRML MDRD: NORMAL ML/MIN/{1.73_M2}
GFR SERPL CREATININE-BSD FRML MDRD: NORMAL ML/MIN/{1.73_M2}
GLUCOSE BLD-MCNC: 97 MG/DL (ref 70–99)
HCT VFR BLD CALC: 40.1 % (ref 40.7–50.3)
HEMOGLOBIN: 13.2 G/DL (ref 13–17)
MCH RBC QN AUTO: 31.9 PG (ref 25.2–33.5)
MCHC RBC AUTO-ENTMCNC: 32.9 G/DL (ref 28.4–34.8)
MCV RBC AUTO: 96.9 FL (ref 82.6–102.9)
NRBC AUTOMATED: 0 PER 100 WBC
PDW BLD-RTO: 13.1 % (ref 11.8–14.4)
PLATELET # BLD: 246 K/UL (ref 138–453)
PMV BLD AUTO: 9.8 FL (ref 8.1–13.5)
POTASSIUM SERPL-SCNC: 4.3 MMOL/L (ref 3.7–5.3)
PROSTATE SPECIFIC ANTIGEN: <0.02 UG/L
RBC # BLD: 4.14 M/UL (ref 4.21–5.77)
SODIUM BLD-SCNC: 137 MMOL/L (ref 135–144)
URIC ACID: 5.1 MG/DL (ref 3.4–7)
WBC # BLD: 4.1 K/UL (ref 3.5–11.3)

## 2021-09-15 PROCEDURE — 90694 VACC AIIV4 NO PRSRV 0.5ML IM: CPT | Performed by: NURSE PRACTITIONER

## 2021-09-15 PROCEDURE — 84153 ASSAY OF PSA TOTAL: CPT

## 2021-09-15 PROCEDURE — 36415 COLL VENOUS BLD VENIPUNCTURE: CPT

## 2021-09-15 PROCEDURE — 84550 ASSAY OF BLOOD/URIC ACID: CPT

## 2021-09-15 PROCEDURE — 80048 BASIC METABOLIC PNL TOTAL CA: CPT

## 2021-09-15 PROCEDURE — 85027 COMPLETE CBC AUTOMATED: CPT

## 2021-09-15 PROCEDURE — 99214 OFFICE O/P EST MOD 30 MIN: CPT | Performed by: NURSE PRACTITIONER

## 2021-09-15 PROCEDURE — 90471 IMMUNIZATION ADMIN: CPT | Performed by: NURSE PRACTITIONER

## 2021-09-15 RX ORDER — ALLOPURINOL 300 MG/1
TABLET ORAL
Qty: 90 TABLET | Refills: 1 | Status: SHIPPED | OUTPATIENT
Start: 2021-09-15 | End: 2022-03-02 | Stop reason: SDUPTHER

## 2021-09-15 RX ORDER — NITROGLYCERIN 0.4 MG/1
0.4 TABLET SUBLINGUAL EVERY 5 MIN PRN
Qty: 90 TABLET | Refills: 1 | Status: SHIPPED | OUTPATIENT
Start: 2021-09-15

## 2021-09-15 RX ORDER — SPIRONOLACTONE 25 MG/1
25 TABLET ORAL DAILY
Qty: 90 TABLET | Refills: 1 | Status: SHIPPED | OUTPATIENT
Start: 2021-09-15 | End: 2022-01-27

## 2021-09-15 RX ORDER — MELOXICAM 7.5 MG/1
7.5 TABLET ORAL 2 TIMES DAILY
Qty: 180 TABLET | Refills: 1 | Status: SHIPPED | OUTPATIENT
Start: 2021-09-15 | End: 2022-03-02 | Stop reason: SDUPTHER

## 2021-09-15 RX ORDER — ZOLPIDEM TARTRATE 10 MG/1
10 TABLET ORAL NIGHTLY PRN
Qty: 90 TABLET | Refills: 1 | Status: SHIPPED | OUTPATIENT
Start: 2021-09-15 | End: 2021-12-14

## 2021-09-15 RX ORDER — AMLODIPINE BESYLATE 5 MG/1
10 TABLET ORAL DAILY
Qty: 180 TABLET | Refills: 1 | Status: SHIPPED | OUTPATIENT
Start: 2021-09-15 | End: 2022-03-02 | Stop reason: SDUPTHER

## 2021-09-15 RX ORDER — LISINOPRIL 10 MG/1
TABLET ORAL
Qty: 315 TABLET | Refills: 1 | Status: SHIPPED | OUTPATIENT
Start: 2021-09-15 | End: 2022-03-02 | Stop reason: SDUPTHER

## 2021-09-15 ASSESSMENT — ENCOUNTER SYMPTOMS
SHORTNESS OF BREATH: 0
DIARRHEA: 0
COUGH: 0
BLOOD IN STOOL: 1
CONSTIPATION: 0

## 2021-09-15 NOTE — PROGRESS NOTES
Gout     Hyperlipidemia 11/23/2018    Hypertension     Leukocytosis 11/15/2018    Need for prophylactic vaccination and inoculation against varicella 06/01/2018    Osteoarthritis     Osteoarthrosis 11/23/2018    Primary insomnia     Prostate cancer ([address] 11/04/2018    Rising PSA level 05/10/2018    Sleep apnea       Past Surgical History:   Procedure Laterality Date    CARPAL TUNNEL RELEASE      COLONOSCOPY N/A 11/6/2019    COLONOSCOPY DIAGNOSTIC performed by Taryn Milner MD at [facility]    CYSTOSCOPY  10/08/2020    bladder fulgration    CYSTOSCOPY      CYSTOSCOPY W BIOPSY OF BLADDER N/A 10/8/2020    CYSTOSCOPY, BLADDER BIOPSY FULGARATION performed by Elliott Neri MD at [address] [facility],[address]  2012    AL LAP,PROSTATECTOMY,RADICAL,W/NERVE SPARE,INCL ROBOTIC N/A 11/9/2018    ROBOTIC RADICAL PROSTATECTOMY WITH PELVIC LYMPH NODES DISSECTION performed by Megan Gurrola MD at [address] [place] [address] AND ADENOIDECTOMY      UPPER GASTROINTESTINAL ENDOSCOPY  11/6/2019    EGD BIOPSY performed by Taryn Milner MD at [facility]     Family History   Problem Relation Age of Onset    Arrhythmia Mother     Dementia Mother     Prostate Cancer Father 71    Kidney Cancer Father         62s     Heart Failure Father     Heart Disease Father     Obesity Sister     Hypertension Sister     Obesity Sister     Hypertension Sister     Prostate Cancer Paternal Grandfather 76    Cancer Maternal Aunt         d. 48 unknown cancer      Social History     Tobacco Use    Smoking status: Never Smoker    Smokeless tobacco: Never Used   Substance Use Topics    Alcohol use:  Yes     Alcohol/week: 2.0 standard drinks     Types: 2 Cans of beer per week      Allergies   Allergen Reactions    Phenothiazines Other (See Comments)     Neuromotor reaction called a phenothiazine reaction    Sulfa Antibiotics Other (See Comments)    Prochlorperazine Edisylate Other (See Comments)     Lose control       Subjective:     Review of Systems   Constitutional: Negative for chills and fever. Respiratory: Negative for cough and shortness of breath. Cardiovascular: Negative for chest pain and leg swelling. Gastrointestinal: Positive for blood in stool (d/t history of radiation). Negative for constipation and diarrhea. Objective:     /88 (Site: Right Upper Arm, Position: Sitting, Cuff Size: Large Adult)   Pulse 69   Temp 98.1 °F (36.7 °C)   Wt 235 lb (106.6 kg)   SpO2 94%   BMI 36.81 kg/m²    Physical Exam  Constitutional:       General: He is not in acute distress. Appearance: He is well-developed. HENT:      Head: Normocephalic. Right Ear: Tympanic membrane and external ear normal.      Left Ear: Tympanic membrane and external ear normal.      Nose: Nose normal. No nasal deformity. Mouth/Throat:      Mouth: Mucous membranes are moist. No injury or oral lesions. Cardiovascular:      Rate and Rhythm: Normal rate and regular rhythm. Heart sounds: Normal heart sounds. Pulmonary:      Effort: Pulmonary effort is normal.      Breath sounds: Normal breath sounds. Musculoskeletal:         General: Normal range of motion. Skin:     General: Skin is warm and dry. Neurological:      Mental Status: He is alert and oriented to person, place, and time. Assessment/Plan         1. Primary insomnia  Insurance not allowing 1.5 pills of ambien nightly, will increase dose to 10 mg and pt will take half.   - zolpidem (AMBIEN) 10 MG tablet; Take 1 tablet by mouth nightly as needed for Sleep for up to 90 days. Dispense: 90 tablet; Refill: 1    2. Medication refill    - meloxicam (MOBIC) 7.5 MG tablet; Take 1 tablet by mouth 2 times daily with food  Dispense: 180 tablet; Refill: 1  - allopurinol (ZYLOPRIM) 300 MG tablet; TAKE 3/4 TABLET BY MOUTH DAILY  Dispense: 90 tablet;  Refill: 1  - amLODIPine (NORVASC) 5 MG tablet; Take 2 tablets by mouth daily  Dispense: 180 tablet; Refill: 1  - spironolactone (ALDACTONE) 25 MG tablet; Take 1 tablet by mouth daily  Dispense: 90 tablet; Refill: 1  - lisinopril (PRINIVIL;ZESTRIL) 10 MG tablet; Takes 15 mg in morning, 20 mg in the evening  Dispense: 315 tablet; Refill: 1    3. Arthralgia of multiple joints    - meloxicam (MOBIC) 7.5 MG tablet; Take 1 tablet by mouth 2 times daily with food  Dispense: 180 tablet; Refill: 1    4. Hyperuricemia    - allopurinol (ZYLOPRIM) 300 MG tablet; TAKE 3/4 TABLET BY MOUTH DAILY  Dispense: 90 tablet; Refill: 1  - Uric Acid; Future    5. Essential hypertension    - amLODIPine (NORVASC) 5 MG tablet; Take 2 tablets by mouth daily  Dispense: 180 tablet; Refill: 1  - spironolactone (ALDACTONE) 25 MG tablet; Take 1 tablet by mouth daily  Dispense: 90 tablet; Refill: 1  - lisinopril (PRINIVIL;ZESTRIL) 10 MG tablet; Takes 15 mg in morning, 20 mg in the evening  Dispense: 315 tablet; Refill: 1  - CBC; Future  - Basic Metabolic Panel; Future    6. Need for immunization against influenza    - NE IMMUNIZ ADMIN,1 SINGLE/COMB VAC/TOXOID    7. Prostate cancer ([facility]    - PSA, Diagnostic; Future    RTO if symptoms worsen or fail to improve  Pt agreeable with plan      Patient given educationalmaterials - see patient instructions. Discussed use, benefit, and side effectsof prescribed medications. All patient questions answered. Pt voiced understanding. Reviewed health maintenance. Instructed to continue current medications, diet andexercise. 1.  Bogdan received counseling on the following healthy behaviors: nutrition, exercise and medication adherence  2. Patient given educational materials when available - see patient instructionswhen applicable  3. Discussed use, benefit, and side effects of prescribed medications. Barriersto medication compliance addressed. All patient questions answered. Pt voicedunderstanding.    4.  Reviewed prior labs and health maintenance  5. Continuecurrent medications, diet and exercise. Completed Refills   Requested Prescriptions     Signed Prescriptions Disp Refills    zolpidem (AMBIEN) 10 MG tablet 90 tablet 1     Sig: Take 1 tablet by mouth nightly as needed for Sleep for up to 90 days.  meloxicam (MOBIC) 7.5 MG tablet 180 tablet 1     Sig: Take 1 tablet by mouth 2 times daily with food    allopurinol (ZYLOPRIM) 300 MG tablet 90 tablet 1     Sig: TAKE 3/4 TABLET BY MOUTH DAILY    amLODIPine (NORVASC) 5 MG tablet 180 tablet 1     Sig: Take 2 tablets by mouth daily    spironolactone (ALDACTONE) 25 MG tablet 90 tablet 1     Sig: Take 1 tablet by mouth daily    lisinopril (PRINIVIL;ZESTRIL) 10 MG tablet 315 tablet 1     Sig: Takes 15 mg in morning, 20 mg in the evening    nitroGLYCERIN (NITROSTAT) 0.4 MG SL tablet 90 tablet 1     Sig: Place 1 tablet under the tongue every 5 minutes as needed for Chest pain         This note was transcribed using dictation with Dragon services. Efforts were made to correct any errors but some words may be misinterpreted.     Electronically signed by JHONNY Gayle CNP, CNP on 9/15/2021 at 3:21 PM

## 2021-09-15 NOTE — PROGRESS NOTES
Visit Information    Have you changed or started any medications since your last visit including any over-the-counter medicines, vitamins, or herbal medicines? no   Are you having any side effects from any of your medications? -  no  Have you stopped taking any of your medications? Is so, why? -  no    Have you seen any other physician or provider since your last visit? No  Have you had any other diagnostic tests since your last visit? Yes  Have you been seen in the emergency room and/or had an admission to a hospital since we last saw you? Yes  Have you had your routine dental cleaning in the past 6 months? no    Have you activated your [employer] account? If not, what are your barriers?  Yes     Patient Care Team:  Anna Fragoso PA-C as PCP - General (Physician Assistant)  Rena Wharton MD as Consulting Physician (Infectious Diseases)  Maria E Hinton RN as Nurse Navigator (Oncology)  Jaelyn Zelaya MD as Consulting Physician (Urology)  Lynn Van MD as Consulting Physician (Cardiology)    Medical History Review  Past Medical, Family, and Social History reviewed and does contribute to the patient presenting condition    Health Maintenance   Topic Date Due    Flu vaccine (1) 09/01/2021    Lipid screen  02/05/2022    Potassium monitoring  02/24/2022    Creatinine monitoring  02/24/2022    PSA counseling  02/24/2022    DTaP/Tdap/Td vaccine (2 - Td or Tdap) 05/10/2027    Colon cancer screen colonoscopy  11/06/2029    Shingles Vaccine  Completed    Pneumococcal 65+ years Vaccine  Completed    COVID-19 Vaccine  Completed    Hepatitis C screen  Completed    Hepatitis A vaccine  Aged Out    Hepatitis B vaccine  Aged Out    Hib vaccine  Aged Out    Meningococcal (ACWY) vaccine  Aged Out

## 2021-11-19 ENCOUNTER — TELEPHONE (OUTPATIENT)
Dept: PRIMARY CARE CLINIC | Age: 71
End: 2021-11-19

## 2021-11-19 NOTE — TELEPHONE ENCOUNTER
----- Message from Sidney Tompkins sent at 11/18/2021 12:05 PM EST -----  Subject: Message to Provider    QUESTIONS  Information for Provider? patient is requesting to have a call back in   regards of last flu vaccine lot number . ---------------------------------------------------------------------------  --------------  [employer] INFO  What is the best way for the office to contact you? OK to leave message on   voicemail  Preferred Call Back Phone Number? [phone]  ---------------------------------------------------------------------------  --------------  SCRIPT ANSWERS  Relationship to Patient?  Self

## 2021-12-07 DIAGNOSIS — E78.00 PURE HYPERCHOLESTEROLEMIA: ICD-10-CM

## 2021-12-07 DIAGNOSIS — I10 ESSENTIAL HYPERTENSION: ICD-10-CM

## 2021-12-07 DIAGNOSIS — Z76.0 MEDICATION REFILL: ICD-10-CM

## 2021-12-07 NOTE — TELEPHONE ENCOUNTER
Health Maintenance   Topic Date Due    COVID-19 Vaccine (4 - Booster) 07/07/2021    Lipid screen  02/05/2022    Potassium monitoring  09/15/2022    Creatinine monitoring  09/15/2022    PSA counseling  09/15/2022    DTaP/Tdap/Td vaccine (2 - Td or Tdap) 05/10/2027    Colon cancer screen colonoscopy  11/06/2029    Flu vaccine  Completed    Shingles Vaccine  Completed    Pneumococcal 65+ years Vaccine  Completed    Hepatitis C screen  Completed    Hepatitis A vaccine  Aged Out    Hepatitis B vaccine  Aged Out    Hib vaccine  Aged Out    Meningococcal (ACWY) vaccine  Aged Out             (applicable per patient's age: Cancer Screenings, Depression Screening, Fall Risk Screening, Immunizations)    Hemoglobin A1C (%)   Date Value   09/05/2019 5.2   03/26/2019 5.7     LDL Cholesterol (mg/dL)   Date Value   02/05/2021 67     AST (U/L)   Date Value   02/05/2021 22     ALT (U/L)   Date Value   02/05/2021 18     BUN (mg/dL)   Date Value   09/15/2021 15      (goal A1C is < 7)   (goal LDL is <100) need 30-50% reduction from baseline     BP Readings from Last 3 Encounters:   09/15/21 130/88   03/25/21 102/73   03/24/21 138/75    (goal /80)      All Future Testing planned in [facility]:  Lab Frequency Next Occurrence   PSA, Diagnostic Once 09/20/2021   PSA, Diagnostic Once 03/28/2022       Next Visit Date:  Future Appointments   Date Time Provider [facility]   3/16/2022  2:00 PM Clinton Severin, APRN - CNP [facility]   3/30/2022  2:10 PM iMndy Menon MD [facility]            Patient Active Problem List:     Asymptomatic premature ventricular contractions     Primary insomnia     Prostate cancer ([address]     Gout     High triglycerides     Hypertension     Osteoarthrosis     Hypokalemia     Atrial flutter with rapid ventricular response (HCC)     Frequency of micturition     Urge incontinence     History of prostate cancer     Gross hematuria     Renal cyst, right

## 2021-12-08 RX ORDER — CLONIDINE HYDROCHLORIDE 0.1 MG/1
0.1 TABLET ORAL DAILY
Qty: 90 TABLET | Refills: 1 | Status: SHIPPED | OUTPATIENT
Start: 2021-12-08 | End: 2022-03-02 | Stop reason: SDUPTHER

## 2021-12-08 RX ORDER — ROSUVASTATIN CALCIUM 10 MG/1
10 TABLET, COATED ORAL NIGHTLY
Qty: 90 TABLET | Refills: 1 | Status: SHIPPED | OUTPATIENT
Start: 2021-12-08 | End: 2022-03-02 | Stop reason: SDUPTHER

## 2021-12-20 ENCOUNTER — TELEPHONE (OUTPATIENT)
Dept: PRIMARY CARE CLINIC | Age: 71
End: 2021-12-20

## 2021-12-20 ENCOUNTER — HOSPITAL ENCOUNTER (OUTPATIENT)
Age: 71
Discharge: HOME | End: 2021-12-20
Payer: COMMERCIAL

## 2021-12-20 DIAGNOSIS — C61 PROSTATE CANCER (HCC): ICD-10-CM

## 2021-12-20 DIAGNOSIS — I10 ESSENTIAL HYPERTENSION: Primary | ICD-10-CM

## 2021-12-20 DIAGNOSIS — I10 ESSENTIAL HYPERTENSION: ICD-10-CM

## 2021-12-20 DIAGNOSIS — M1A.9XX0 CHRONIC GOUT WITHOUT TOPHUS, UNSPECIFIED CAUSE, UNSPECIFIED SITE: ICD-10-CM

## 2021-12-20 LAB
-: ABNORMAL
ALBUMIN SERPL-MCNC: 4.6 G/DL (ref 3.5–5.2)
ALBUMIN/GLOBULIN RATIO: 1.6 (ref 1–2.5)
ALP BLD-CCNC: 82 U/L (ref 40–129)
ALT SERPL-CCNC: 21 U/L (ref 5–41)
AMORPHOUS: ABNORMAL
ANION GAP SERPL CALCULATED.3IONS-SCNC: 15 MMOL/L (ref 9–17)
AST SERPL-CCNC: 27 U/L
BACTERIA: ABNORMAL
BILIRUB SERPL-MCNC: 0.43 MG/DL (ref 0.3–1.2)
BILIRUBIN URINE: NEGATIVE
BUN BLDV-MCNC: 18 MG/DL (ref 8–23)
BUN/CREAT BLD: ABNORMAL (ref 9–20)
CALCIUM SERPL-MCNC: 9.8 MG/DL (ref 8.6–10.4)
CASTS UA: ABNORMAL /LPF (ref 0–8)
CHLORIDE BLD-SCNC: 103 MMOL/L (ref 98–107)
CO2: 22 MMOL/L (ref 20–31)
COLOR: YELLOW
CREAT SERPL-MCNC: 1.09 MG/DL (ref 0.7–1.2)
CRYSTALS, UA: ABNORMAL /HPF
EPITHELIAL CELLS UA: ABNORMAL /HPF (ref 0–5)
GFR AFRICAN AMERICAN: >60 ML/MIN
GFR NON-AFRICAN AMERICAN: >60 ML/MIN
GFR SERPL CREATININE-BSD FRML MDRD: ABNORMAL ML/MIN/{1.73_M2}
GFR SERPL CREATININE-BSD FRML MDRD: ABNORMAL ML/MIN/{1.73_M2}
GLUCOSE BLD-MCNC: 107 MG/DL (ref 70–99)
GLUCOSE URINE: NEGATIVE
HCT VFR BLD CALC: 42.7 % (ref 40.7–50.3)
HEMOGLOBIN: 13.9 G/DL (ref 13–17)
KETONES, URINE: NEGATIVE
LEUKOCYTE ESTERASE, URINE: NEGATIVE
MCH RBC QN AUTO: 32 PG (ref 25.2–33.5)
MCHC RBC AUTO-ENTMCNC: 32.6 G/DL (ref 28.4–34.8)
MCV RBC AUTO: 98.2 FL (ref 82.6–102.9)
MUCUS: ABNORMAL
NITRITE, URINE: NEGATIVE
NRBC AUTOMATED: 0 PER 100 WBC
OTHER OBSERVATIONS UA: ABNORMAL
PDW BLD-RTO: 13.4 % (ref 11.8–14.4)
PH UA: 5 (ref 5–8)
PLATELET # BLD: 249 K/UL (ref 138–453)
PMV BLD AUTO: 10.2 FL (ref 8.1–13.5)
POTASSIUM SERPL-SCNC: 4.4 MMOL/L (ref 3.7–5.3)
PROSTATE SPECIFIC ANTIGEN: <0.02 UG/L
PROTEIN UA: NEGATIVE
RBC # BLD: 4.35 M/UL (ref 4.21–5.77)
RBC UA: ABNORMAL /HPF (ref 0–4)
RENAL EPITHELIAL, UA: ABNORMAL /HPF
SODIUM BLD-SCNC: 140 MMOL/L (ref 135–144)
SPECIFIC GRAVITY UA: 1 (ref 1–1.03)
TOTAL PROTEIN: 7.5 G/DL (ref 6.4–8.3)
TRICHOMONAS: ABNORMAL
TSH SERPL DL<=0.05 MIU/L-ACNC: 3.03 MIU/L (ref 0.3–5)
TURBIDITY: CLEAR
URIC ACID: 6 MG/DL (ref 3.4–7)
URINE HGB: NEGATIVE
UROBILINOGEN, URINE: NORMAL
WBC # BLD: 4.8 K/UL (ref 3.5–11.3)
WBC UA: ABNORMAL /HPF (ref 0–5)
YEAST: ABNORMAL

## 2021-12-20 PROCEDURE — 84550 ASSAY OF BLOOD/URIC ACID: CPT

## 2021-12-20 PROCEDURE — 85027 COMPLETE CBC AUTOMATED: CPT

## 2021-12-20 PROCEDURE — 84153 ASSAY OF PSA TOTAL: CPT

## 2021-12-20 PROCEDURE — 36415 COLL VENOUS BLD VENIPUNCTURE: CPT

## 2021-12-20 PROCEDURE — 81001 URINALYSIS AUTO W/SCOPE: CPT

## 2021-12-20 PROCEDURE — 84443 ASSAY THYROID STIM HORMONE: CPT

## 2021-12-20 PROCEDURE — 80053 COMPREHEN METABOLIC PANEL: CPT

## 2021-12-20 NOTE — TELEPHONE ENCOUNTER
Pt called requesting lab orders due to \"feeling out of whack\" would like some levels checked,. Requesting: PSA, UA, CMP, URIC ACID, and anything else you would recommend/suggest. Pt stated he can get done today out at [facility] where he works. Please advise.

## 2022-01-04 ENCOUNTER — TELEPHONE (OUTPATIENT)
Dept: SURGERY | Age: 72
End: 2022-01-04

## 2022-01-04 NOTE — TELEPHONE ENCOUNTER
1/4/2022- Spoke to patient. Surgery scheduled at [facility] [place], patient confirmed and information emailed.      Surgery date/time: 2/10/2022 at 11:00am  Arrival time: 9:00am  Post op appt: 2/22/2022 at 9:00am  *vaccinated

## 2022-01-07 NOTE — TELEPHONE ENCOUNTER
Writer spoke with patient. Advised per cardiology to stop aspirin 7 days prior. Patient stated he is no longer taking aspirin.

## 2022-01-25 ENCOUNTER — OFFICE VISIT (OUTPATIENT)
Dept: SURGERY | Age: 72
End: 2022-01-25
Payer: COMMERCIAL

## 2022-01-25 DIAGNOSIS — K42.9 UMBILICAL HERNIA WITHOUT OBSTRUCTION OR GANGRENE: Primary | ICD-10-CM

## 2022-01-25 PROCEDURE — 99204 OFFICE O/P NEW MOD 45 MIN: CPT | Performed by: SURGERY

## 2022-01-25 NOTE — PROGRESS NOTES
[id number] [facility]   History & Physical  Galarza DO Jasiel  Pt Name: Blanca Carmona  MRN: [id number]  [place]: 1950  Date of evaluation: 1/25/2022  Primary Care Physician: JHONNY Angelo - MANDIE    Chief Complaint: umbilical hernia      SUBJECTIVE:    History of Present Illness: This is a 70 y.o.  male who presents for evaluation for the above, present for some time, slowly becoming more symptomatic, reducible without history of obstruction. Previous history of lap prostatectomy. Chart review performed to add information to the HPI: Yes    Past Medical History   has a past medical history of Abdominal pain, Arthritis, Back pain, Benign essential HTN, Caffeine use, Cough, Elevated CPK, Elevated PSA measurement, Frequent PVCs, Gout, Hyperlipidemia, Hypertension, Leukocytosis, Need for prophylactic vaccination and inoculation against varicella, Osteoarthritis, Osteoarthrosis, Primary insomnia, Prostate cancer ([address] Rising PSA level, and Sleep apnea. Past Surgical History   has a past surgical history that includes hip surgery (2012); Hand surgery; pr lap,prostatectomy,radical,w/nerve spare,incl robotic (N/A, 11/9/2018); Colonoscopy (N/A, 11/6/2019); Upper gastrointestinal endoscopy (11/6/2019); Prostate surgery; Tonsillectomy and adenoidectomy; Carpal tunnel release; Cystocopy (10/08/2020); cystoscopy w biopsy of bladder (N/A, 10/8/2020); and Cystoscopy. Family History  family history includes Arrhythmia in his mother; Cancer in his maternal aunt; Dementia in his mother; Heart Disease in his father; Heart Failure in his father; Hypertension in his sister and sister; Kidney Cancer in his father; Obesity in his sister and sister; Prostate Cancer (age of onset: 76) in his paternal grandfather; Prostate Cancer (age of onset: 71) in his father. Social History  Tobacco use:  reports that he has never smoked.  He has never used smokeless tobacco.  Alcohol use:  reports current alcohol use of about 2.0 standard drinks of alcohol per week. Drug use:  reports no history of drug use. Medications  Current Medications:   Current Outpatient Medications   Medication Sig Dispense Refill    cloNIDine (CATAPRES) 0.1 MG tablet Take 1 tablet by mouth daily 90 tablet 1    rosuvastatin (CRESTOR) 10 MG tablet Take 1 tablet by mouth nightly 90 tablet 1    oxybutynin (DITROPAN XL) 15 MG extended release tablet Take 1 tablet by mouth daily 90 tablet 1    meloxicam (MOBIC) 7.5 MG tablet Take 1 tablet by mouth 2 times daily with food 180 tablet 1    allopurinol (ZYLOPRIM) 300 MG tablet TAKE 3/4 TABLET BY MOUTH DAILY 90 tablet 1    amLODIPine (NORVASC) 5 MG tablet Take 2 tablets by mouth daily 180 tablet 1    spironolactone (ALDACTONE) 25 MG tablet Take 1 tablet by mouth daily 90 tablet 1    lisinopril (PRINIVIL;ZESTRIL) 10 MG tablet Takes 15 mg in morning, 20 mg in the evening 315 tablet 1    nitroGLYCERIN (NITROSTAT) 0.4 MG SL tablet Place 1 tablet under the tongue every 5 minutes as needed for Chest pain 90 tablet 1    flecainide (TAMBOCOR) 100 MG tablet Take 1 tablet by mouth 2 times daily 60 tablet 0    Multiple Vitamin (MULTI-VITAMIN) TABS Take 1 tablet by mouth       No current facility-administered medications for this visit. Home Medications:   Prior to Admission medications    Medication Sig Start Date End Date Taking?  Authorizing Provider   cloNIDine (CATAPRES) 0.1 MG tablet Take 1 tablet by mouth daily 12/8/21   JHONNY Rodriguez CNP   rosuvastatin (CRESTOR) 10 MG tablet Take 1 tablet by mouth nightly 12/8/21   JHONNY Rodriguez CNP   oxybutynin (DITROPAN XL) 15 MG extended release tablet Take 1 tablet by mouth daily 11/22/21   Pat Pimentel MD   meloxicam (MOBIC) 7.5 MG tablet Take 1 tablet by mouth 2 times daily with food 9/15/21   JHONNY Rodriguez CNP   allopurinol (ZYLOPRIM) 300 MG tablet TAKE 3/4 TABLET BY MOUTH DAILY 9/15/21 Catheryn Gum, APRN - CNP   amLODIPine (NORVASC) 5 MG tablet Take 2 tablets by mouth daily 9/15/21   Catheryn Gum, APRN - CNP   spironolactone (ALDACTONE) 25 MG tablet Take 1 tablet by mouth daily 9/15/21   Catheryn Gum, APRN - CNP   lisinopril (PRINIVIL;ZESTRIL) 10 MG tablet Takes 15 mg in morning, 20 mg in the evening 9/15/21   Catheryn Gum, APRN - CNP   nitroGLYCERIN (NITROSTAT) 0.4 MG SL tablet Place 1 tablet under the tongue every 5 minutes as needed for Chest pain 9/15/21   Catheryn Gum, APRN - CNP   flecainide (TAMBOCOR) 100 MG tablet Take 1 tablet by mouth 2 times daily 12/23/20 9/15/21  Evelyn Cheek, APRN - CNP   Multiple Vitamin (MULTI-VITAMIN) TABS Take 1 tablet by mouth    Historical Provider, MD       Allergies  Phenothiazines, Sulfa antibiotics, and Prochlorperazine edisylate      Review of Systems:  General: Denies any fever, chills. Eyes: Denies any changes in vision, diplopia or eye pain  Ears, Nose, Mouth: Denies changes in hearing/tinnitus or drainage from ears, no rhinorrhea or bloody nose, no difficulty chewing  Throat: no difficulty swallowing, no throat pain  Respiratory: Denies any shortness of breath or cough. Cardiac: Denies any chest pain, palpitations, claudication or edema. Gastrointestinal: Denies any melena, hematochezia, hematemesis or pyrosis. Genitourinary: Denies any frequency, urgency, hesitancy or incontinence. Musculoskeletal: Denies worsening muscle weakness or recent trauma  Skin: Denies rashes or lesions  Psychiatric: Denies any recent changes in mood or affect  Hematologic: Denies bruising or bleeding easily. PHYSICAL EXAMINATION  Vitals: There were no vitals filed for this visit. General Appearance:  awake, alert, no acute distress, well developed, well nourished   Skin:  Skin color, texture, turgor normal. No rashes or lesions.   Head/face:  NCAT, face symmetrical  Eyes:  PERRL, no evidence of conjunctivitis or ptosis bilaterally  Ears:  External ears and canals grossly normal, no evidence of otorrhea. Nose/Sinuses:  Nares normal. Septum midline. Mucosa normal. No external drainage noted. Mouth/Neck:  Mucosa moist.  No external oral lesions. Trachea midline. No visible masses. Lungs:  Normal chest expansion, unlabored breathing without accessory muscle use. No audible rales, rhonchi, or wheezing. Cardiovascular: S1S2. No evidence of JVD. No evidence of pulsatile masses in abdomen  Abdomen:  Soft, reducible umbilical hernia, there is a well healed laparoscopic scar superior to this, overall normal overlying skin without evidence of bleeding/infection  Musculoskeletal: No evidence of bony/muscular deformities, trauma, atrophy of either left/right upper/lower extremity. No evidence of digital clubbing or cyanosis. Neurologic:  CN 2-12 grossly intact without obvious deficits. Grossly normal sensation in all extremities. Psychiatric: appropriate judgement and insight, appropriate recall of recent and remote memory, no evidence of depression/anxiety/agitation        DIAGNOSES:   Diagnosis Orders   1. Umbilical hernia without obstruction or gangrene         PLAN:  We discussed several surgical options, pt would like to proceed with open repair without mesh at this time. The risks include bleeding, infection, scarring, nerve pain, damage to surrounding tissues, recurrence of hernia, need for further surgery in the future. The benefits, alternatives, complications and procedure details were explained to the patient, all questions were answered.   ·   ·       Medical Decision Making: moderate complexity     Electronically signed by Darlean Heimlich, DO on 1/25/2022 at 9:55 AM

## 2022-01-27 ENCOUNTER — HOSPITAL ENCOUNTER (OUTPATIENT)
Dept: PREADMISSION TESTING | Age: 72
Discharge: HOME | End: 2022-01-31
Payer: COMMERCIAL

## 2022-01-27 VITALS
HEART RATE: 67 BPM | SYSTOLIC BLOOD PRESSURE: 143 MMHG | BODY MASS INDEX: 34.1 KG/M2 | HEIGHT: 68 IN | DIASTOLIC BLOOD PRESSURE: 87 MMHG | TEMPERATURE: 96.7 F | WEIGHT: 225 LBS | RESPIRATION RATE: 16 BRPM | OXYGEN SATURATION: 97 %

## 2022-01-27 PROCEDURE — 93005 ELECTROCARDIOGRAM TRACING: CPT | Performed by: ANESTHESIOLOGY

## 2022-01-27 RX ORDER — FUROSEMIDE 20 MG/1
10 TABLET ORAL SEE ADMIN INSTRUCTIONS
COMMUNITY

## 2022-01-27 RX ORDER — ZOLPIDEM TARTRATE 5 MG/1
10 TABLET ORAL NIGHTLY
COMMUNITY
End: 2022-03-02 | Stop reason: SDUPTHER

## 2022-01-28 LAB
EKG ATRIAL RATE: 70 BPM
EKG P AXIS: 74 DEGREES
EKG P-R INTERVAL: 190 MS
EKG Q-T INTERVAL: 466 MS
EKG QRS DURATION: 150 MS
EKG QTC CALCULATION (BAZETT): 503 MS
EKG R AXIS: -10 DEGREES
EKG T AXIS: 90 DEGREES
EKG VENTRICULAR RATE: 70 BPM

## 2022-01-31 ENCOUNTER — ANESTHESIA EVENT (OUTPATIENT)
Dept: OPERATING ROOM | Age: 72
End: 2022-01-31
Payer: COMMERCIAL

## 2022-02-09 ENCOUNTER — TELEPHONE (OUTPATIENT)
Dept: SURGERY | Age: 72
End: 2022-02-09

## 2022-02-10 ENCOUNTER — HOSPITAL ENCOUNTER (OUTPATIENT)
Age: 72
Setting detail: OUTPATIENT SURGERY
Discharge: HOME | End: 2022-02-10
Attending: SURGERY | Admitting: SURGERY
Payer: COMMERCIAL

## 2022-02-10 ENCOUNTER — ANESTHESIA (OUTPATIENT)
Dept: OPERATING ROOM | Age: 72
End: 2022-02-10
Payer: COMMERCIAL

## 2022-02-10 VITALS — TEMPERATURE: 96.1 F | DIASTOLIC BLOOD PRESSURE: 70 MMHG | SYSTOLIC BLOOD PRESSURE: 116 MMHG | OXYGEN SATURATION: 97 %

## 2022-02-10 VITALS
OXYGEN SATURATION: 92 % | BODY MASS INDEX: 36.1 KG/M2 | SYSTOLIC BLOOD PRESSURE: 124 MMHG | HEART RATE: 52 BPM | WEIGHT: 230 LBS | HEIGHT: 67 IN | RESPIRATION RATE: 9 BRPM | TEMPERATURE: 97.7 F | DIASTOLIC BLOOD PRESSURE: 72 MMHG

## 2022-02-10 DIAGNOSIS — K42.0 UMBILICAL HERNIA, INCARCERATED: Primary | ICD-10-CM

## 2022-02-10 PROCEDURE — C9290 INJ, BUPIVACAINE LIPOSOME: HCPCS | Performed by: ANESTHESIOLOGY

## 2022-02-10 PROCEDURE — 3600000012 HC SURGERY LEVEL 2 ADDTL 15MIN: Performed by: SURGERY

## 2022-02-10 PROCEDURE — 2709999900 HC NON-CHARGEABLE SUPPLY: Performed by: SURGERY

## 2022-02-10 PROCEDURE — 2580000003 HC RX 258: Performed by: NURSE ANESTHETIST, CERTIFIED REGISTERED

## 2022-02-10 PROCEDURE — 3600000002 HC SURGERY LEVEL 2 BASE: Performed by: SURGERY

## 2022-02-10 PROCEDURE — 49585 REPAIR UMBILICAL HERN,5+Y/O,REDUC: CPT | Performed by: SURGERY

## 2022-02-10 PROCEDURE — 7100000010 HC PHASE II RECOVERY - FIRST 15 MIN: Performed by: SURGERY

## 2022-02-10 PROCEDURE — 7100000011 HC PHASE II RECOVERY - ADDTL 15 MIN: Performed by: SURGERY

## 2022-02-10 PROCEDURE — 7100000000 HC PACU RECOVERY - FIRST 15 MIN: Performed by: SURGERY

## 2022-02-10 PROCEDURE — 2500000003 HC RX 250 WO HCPCS: Performed by: NURSE ANESTHETIST, CERTIFIED REGISTERED

## 2022-02-10 PROCEDURE — 3700000001 HC ADD 15 MINUTES (ANESTHESIA): Performed by: SURGERY

## 2022-02-10 PROCEDURE — 6360000002 HC RX W HCPCS

## 2022-02-10 PROCEDURE — 2500000003 HC RX 250 WO HCPCS: Performed by: ANESTHESIOLOGY

## 2022-02-10 PROCEDURE — 6360000002 HC RX W HCPCS: Performed by: NURSE ANESTHETIST, CERTIFIED REGISTERED

## 2022-02-10 PROCEDURE — 3700000000 HC ANESTHESIA ATTENDED CARE: Performed by: SURGERY

## 2022-02-10 PROCEDURE — 6360000002 HC RX W HCPCS: Performed by: SURGERY

## 2022-02-10 PROCEDURE — 6360000002 HC RX W HCPCS: Performed by: ANESTHESIOLOGY

## 2022-02-10 PROCEDURE — 7100000001 HC PACU RECOVERY - ADDTL 15 MIN: Performed by: SURGERY

## 2022-02-10 PROCEDURE — 2500000003 HC RX 250 WO HCPCS: Performed by: SURGERY

## 2022-02-10 PROCEDURE — 64488 TAP BLOCK BI INJECTION: CPT | Performed by: ANESTHESIOLOGY

## 2022-02-10 RX ORDER — NEOSTIGMINE METHYLSULFATE 5 MG/5 ML
SYRINGE (ML) INTRAVENOUS PRN
Status: DISCONTINUED | OUTPATIENT
Start: 2022-02-10 | End: 2022-02-10 | Stop reason: SDUPTHER

## 2022-02-10 RX ORDER — MORPHINE SULFATE 2 MG/ML
2 INJECTION, SOLUTION INTRAMUSCULAR; INTRAVENOUS EVERY 5 MIN PRN
Status: DISCONTINUED | OUTPATIENT
Start: 2022-02-10 | End: 2022-02-10 | Stop reason: HOSPADM

## 2022-02-10 RX ORDER — BUPIVACAINE HYDROCHLORIDE 2.5 MG/ML
INJECTION, SOLUTION EPIDURAL; INFILTRATION; INTRACAUDAL
Status: DISCONTINUED
Start: 2022-02-10 | End: 2022-02-10 | Stop reason: HOSPADM

## 2022-02-10 RX ORDER — DIPHENHYDRAMINE HYDROCHLORIDE 50 MG/ML
12.5 INJECTION INTRAMUSCULAR; INTRAVENOUS
Status: DISCONTINUED | OUTPATIENT
Start: 2022-02-10 | End: 2022-02-10 | Stop reason: HOSPADM

## 2022-02-10 RX ORDER — LABETALOL 20 MG/4 ML (5 MG/ML) INTRAVENOUS SYRINGE
10 EVERY 10 MIN PRN
Status: DISCONTINUED | OUTPATIENT
Start: 2022-02-10 | End: 2022-02-10 | Stop reason: HOSPADM

## 2022-02-10 RX ORDER — FENTANYL CITRATE 50 UG/ML
INJECTION, SOLUTION INTRAMUSCULAR; INTRAVENOUS
Status: COMPLETED
Start: 2022-02-10 | End: 2022-02-10

## 2022-02-10 RX ORDER — SODIUM CHLORIDE, SODIUM LACTATE, POTASSIUM CHLORIDE, CALCIUM CHLORIDE 600; 310; 30; 20 MG/100ML; MG/100ML; MG/100ML; MG/100ML
INJECTION, SOLUTION INTRAVENOUS CONTINUOUS
Status: DISCONTINUED | OUTPATIENT
Start: 2022-02-10 | End: 2022-02-10 | Stop reason: HOSPADM

## 2022-02-10 RX ORDER — BUPIVACAINE HYDROCHLORIDE 5 MG/ML
INJECTION, SOLUTION EPIDURAL; INTRACAUDAL
Status: COMPLETED
Start: 2022-02-10 | End: 2022-02-10

## 2022-02-10 RX ORDER — 0.9 % SODIUM CHLORIDE 0.9 %
500 INTRAVENOUS SOLUTION INTRAVENOUS
Status: DISCONTINUED | OUTPATIENT
Start: 2022-02-10 | End: 2022-02-10 | Stop reason: HOSPADM

## 2022-02-10 RX ORDER — SODIUM CHLORIDE 9 MG/ML
25 INJECTION, SOLUTION INTRAVENOUS PRN
Status: DISCONTINUED | OUTPATIENT
Start: 2022-02-10 | End: 2022-02-10 | Stop reason: HOSPADM

## 2022-02-10 RX ORDER — FENTANYL CITRATE 50 UG/ML
INJECTION, SOLUTION INTRAMUSCULAR; INTRAVENOUS PRN
Status: DISCONTINUED | OUTPATIENT
Start: 2022-02-10 | End: 2022-02-10 | Stop reason: SDUPTHER

## 2022-02-10 RX ORDER — MIDAZOLAM HYDROCHLORIDE 1 MG/ML
INJECTION INTRAMUSCULAR; INTRAVENOUS
Status: COMPLETED
Start: 2022-02-10 | End: 2022-02-10

## 2022-02-10 RX ORDER — SODIUM CHLORIDE, SODIUM LACTATE, POTASSIUM CHLORIDE, CALCIUM CHLORIDE 600; 310; 30; 20 MG/100ML; MG/100ML; MG/100ML; MG/100ML
INJECTION, SOLUTION INTRAVENOUS CONTINUOUS PRN
Status: DISCONTINUED | OUTPATIENT
Start: 2022-02-10 | End: 2022-02-10 | Stop reason: SDUPTHER

## 2022-02-10 RX ORDER — ACETAMINOPHEN AND CODEINE PHOSPHATE 300; 30 MG/1; MG/1
1 TABLET ORAL EVERY 4 HOURS PRN
Qty: 15 TABLET | Refills: 0 | Status: SHIPPED | OUTPATIENT
Start: 2022-02-10 | End: 2022-02-17

## 2022-02-10 RX ORDER — BUPIVACAINE HYDROCHLORIDE 5 MG/ML
INJECTION, SOLUTION EPIDURAL; INTRACAUDAL
Status: DISCONTINUED
Start: 2022-02-10 | End: 2022-02-10 | Stop reason: HOSPADM

## 2022-02-10 RX ORDER — HYDRALAZINE HYDROCHLORIDE 20 MG/ML
10 INJECTION INTRAMUSCULAR; INTRAVENOUS EVERY 10 MIN PRN
Status: DISCONTINUED | OUTPATIENT
Start: 2022-02-10 | End: 2022-02-10 | Stop reason: HOSPADM

## 2022-02-10 RX ORDER — MIDAZOLAM HYDROCHLORIDE 1 MG/ML
INJECTION INTRAMUSCULAR; INTRAVENOUS PRN
Status: DISCONTINUED | OUTPATIENT
Start: 2022-02-10 | End: 2022-02-10 | Stop reason: SDUPTHER

## 2022-02-10 RX ORDER — CYCLOBENZAPRINE HCL 5 MG
5 TABLET ORAL 3 TIMES DAILY PRN
Qty: 15 TABLET | Refills: 0 | Status: SHIPPED | OUTPATIENT
Start: 2022-02-10 | End: 2022-02-15

## 2022-02-10 RX ORDER — SODIUM CHLORIDE 9 MG/ML
INJECTION, SOLUTION INTRAVENOUS CONTINUOUS
Status: DISCONTINUED | OUTPATIENT
Start: 2022-02-10 | End: 2022-02-10 | Stop reason: HOSPADM

## 2022-02-10 RX ORDER — SODIUM CHLORIDE 0.9 % (FLUSH) 0.9 %
10 SYRINGE (ML) INJECTION EVERY 12 HOURS SCHEDULED
Status: DISCONTINUED | OUTPATIENT
Start: 2022-02-10 | End: 2022-02-10 | Stop reason: HOSPADM

## 2022-02-10 RX ORDER — BUPIVACAINE HYDROCHLORIDE 5 MG/ML
INJECTION, SOLUTION EPIDURAL; INTRACAUDAL
Status: COMPLETED | OUTPATIENT
Start: 2022-02-10 | End: 2022-02-10

## 2022-02-10 RX ORDER — MEPERIDINE HYDROCHLORIDE 50 MG/ML
12.5 INJECTION INTRAMUSCULAR; INTRAVENOUS; SUBCUTANEOUS EVERY 5 MIN PRN
Status: DISCONTINUED | OUTPATIENT
Start: 2022-02-10 | End: 2022-02-10 | Stop reason: HOSPADM

## 2022-02-10 RX ORDER — ONDANSETRON 2 MG/ML
4 INJECTION INTRAMUSCULAR; INTRAVENOUS
Status: DISCONTINUED | OUTPATIENT
Start: 2022-02-10 | End: 2022-02-10 | Stop reason: HOSPADM

## 2022-02-10 RX ORDER — SODIUM CHLORIDE 0.9 % (FLUSH) 0.9 %
10 SYRINGE (ML) INJECTION PRN
Status: DISCONTINUED | OUTPATIENT
Start: 2022-02-10 | End: 2022-02-10 | Stop reason: HOSPADM

## 2022-02-10 RX ORDER — FENTANYL CITRATE 50 UG/ML
100 INJECTION, SOLUTION INTRAMUSCULAR; INTRAVENOUS ONCE
Status: CANCELLED | OUTPATIENT
Start: 2022-02-10 | End: 2022-02-10

## 2022-02-10 RX ORDER — PROPOFOL 10 MG/ML
INJECTION, EMULSION INTRAVENOUS PRN
Status: DISCONTINUED | OUTPATIENT
Start: 2022-02-10 | End: 2022-02-10 | Stop reason: SDUPTHER

## 2022-02-10 RX ORDER — OXYCODONE HYDROCHLORIDE AND ACETAMINOPHEN 5; 325 MG/1; MG/1
2 TABLET ORAL PRN
Status: DISCONTINUED | OUTPATIENT
Start: 2022-02-10 | End: 2022-02-10 | Stop reason: HOSPADM

## 2022-02-10 RX ORDER — LIDOCAINE HYDROCHLORIDE 10 MG/ML
INJECTION, SOLUTION EPIDURAL; INFILTRATION; INTRACAUDAL; PERINEURAL PRN
Status: DISCONTINUED | OUTPATIENT
Start: 2022-02-10 | End: 2022-02-10 | Stop reason: SDUPTHER

## 2022-02-10 RX ORDER — MIDAZOLAM HYDROCHLORIDE 2 MG/2ML
2 INJECTION, SOLUTION INTRAMUSCULAR; INTRAVENOUS ONCE
Status: CANCELLED | OUTPATIENT
Start: 2022-02-10 | End: 2022-02-10

## 2022-02-10 RX ORDER — ROCURONIUM BROMIDE 10 MG/ML
INJECTION, SOLUTION INTRAVENOUS PRN
Status: DISCONTINUED | OUTPATIENT
Start: 2022-02-10 | End: 2022-02-10 | Stop reason: SDUPTHER

## 2022-02-10 RX ORDER — LIDOCAINE HYDROCHLORIDE 10 MG/ML
INJECTION, SOLUTION INFILTRATION; PERINEURAL
Status: COMPLETED
Start: 2022-02-10 | End: 2022-02-10

## 2022-02-10 RX ORDER — OXYCODONE HYDROCHLORIDE AND ACETAMINOPHEN 5; 325 MG/1; MG/1
1 TABLET ORAL PRN
Status: DISCONTINUED | OUTPATIENT
Start: 2022-02-10 | End: 2022-02-10 | Stop reason: HOSPADM

## 2022-02-10 RX ORDER — GLYCOPYRROLATE 1 MG/5 ML
SYRINGE (ML) INTRAVENOUS PRN
Status: DISCONTINUED | OUTPATIENT
Start: 2022-02-10 | End: 2022-02-10 | Stop reason: SDUPTHER

## 2022-02-10 RX ORDER — ONDANSETRON 2 MG/ML
INJECTION INTRAMUSCULAR; INTRAVENOUS PRN
Status: DISCONTINUED | OUTPATIENT
Start: 2022-02-10 | End: 2022-02-10 | Stop reason: SDUPTHER

## 2022-02-10 RX ORDER — EPHEDRINE SULFATE/0.9% NACL/PF 50 MG/5 ML
SYRINGE (ML) INTRAVENOUS PRN
Status: DISCONTINUED | OUTPATIENT
Start: 2022-02-10 | End: 2022-02-10 | Stop reason: SDUPTHER

## 2022-02-10 RX ADMIN — FENTANYL CITRATE 50 MCG: 50 INJECTION, SOLUTION INTRAMUSCULAR; INTRAVENOUS at 11:40

## 2022-02-10 RX ADMIN — FENTANYL CITRATE 50 MCG: 50 INJECTION, SOLUTION INTRAMUSCULAR; INTRAVENOUS at 11:35

## 2022-02-10 RX ADMIN — ONDANSETRON 4 MG: 2 INJECTION INTRAMUSCULAR; INTRAVENOUS at 11:10

## 2022-02-10 RX ADMIN — Medication 0.2 MG: at 10:43

## 2022-02-10 RX ADMIN — CEFAZOLIN SODIUM 2000 MG: 10 INJECTION, POWDER, FOR SOLUTION INTRAVENOUS at 10:27

## 2022-02-10 RX ADMIN — Medication 3 MG: at 11:15

## 2022-02-10 RX ADMIN — Medication 10 MG: at 10:50

## 2022-02-10 RX ADMIN — FENTANYL CITRATE 50 MCG: 50 INJECTION, SOLUTION INTRAMUSCULAR; INTRAVENOUS at 10:20

## 2022-02-10 RX ADMIN — ROCURONIUM BROMIDE 40 MG: 10 INJECTION INTRAVENOUS at 10:20

## 2022-02-10 RX ADMIN — MIDAZOLAM 2 MG: 1 INJECTION INTRAMUSCULAR; INTRAVENOUS at 09:47

## 2022-02-10 RX ADMIN — PROPOFOL INJECTABLE EMULSION 30 MG: 10 INJECTION, EMULSION INTRAVENOUS at 11:15

## 2022-02-10 RX ADMIN — MIDAZOLAM HYDROCHLORIDE 1 MG: 1 INJECTION, SOLUTION INTRAMUSCULAR; INTRAVENOUS at 10:13

## 2022-02-10 RX ADMIN — FENTANYL CITRATE 50 MCG: 50 INJECTION, SOLUTION INTRAMUSCULAR; INTRAVENOUS at 10:32

## 2022-02-10 RX ADMIN — LIDOCAINE HYDROCHLORIDE 50 MG: 10 INJECTION, SOLUTION EPIDURAL; INFILTRATION; INTRACAUDAL; PERINEURAL at 10:18

## 2022-02-10 RX ADMIN — Medication 0.6 MG: at 11:15

## 2022-02-10 RX ADMIN — SODIUM CHLORIDE, POTASSIUM CHLORIDE, SODIUM LACTATE AND CALCIUM CHLORIDE: 600; 310; 30; 20 INJECTION, SOLUTION INTRAVENOUS at 10:53

## 2022-02-10 RX ADMIN — PROPOFOL INJECTABLE EMULSION 200 MG: 10 INJECTION, EMULSION INTRAVENOUS at 10:20

## 2022-02-10 RX ADMIN — PROPOFOL INJECTABLE EMULSION 30 MG: 10 INJECTION, EMULSION INTRAVENOUS at 11:19

## 2022-02-10 RX ADMIN — FENTANYL CITRATE 50 MCG: 50 INJECTION, SOLUTION INTRAMUSCULAR; INTRAVENOUS at 10:19

## 2022-02-10 RX ADMIN — Medication 10 MG: at 10:30

## 2022-02-10 RX ADMIN — SODIUM CHLORIDE, POTASSIUM CHLORIDE, SODIUM LACTATE AND CALCIUM CHLORIDE: 600; 310; 30; 20 INJECTION, SOLUTION INTRAVENOUS at 10:12

## 2022-02-10 RX ADMIN — BUPIVACAINE 20 ML: 13.3 INJECTION, SUSPENSION, LIPOSOMAL INFILTRATION at 09:51

## 2022-02-10 RX ADMIN — FENTANYL CITRATE 100 MCG: 50 INJECTION, SOLUTION INTRAMUSCULAR; INTRAVENOUS at 09:47

## 2022-02-10 RX ADMIN — BUPIVACAINE HYDROCHLORIDE 20 ML: 5 INJECTION, SOLUTION EPIDURAL; INTRACAUDAL; PERINEURAL at 09:51

## 2022-02-10 ASSESSMENT — PULMONARY FUNCTION TESTS
PIF_VALUE: 17
PIF_VALUE: 33
PIF_VALUE: 17
PIF_VALUE: 1
PIF_VALUE: 17
PIF_VALUE: 14
PIF_VALUE: 17
PIF_VALUE: 17
PIF_VALUE: 3
PIF_VALUE: 17
PIF_VALUE: 12
PIF_VALUE: 18
PIF_VALUE: 17
PIF_VALUE: 2
PIF_VALUE: 18
PIF_VALUE: 17
PIF_VALUE: 18
PIF_VALUE: 17
PIF_VALUE: 17
PIF_VALUE: 18
PIF_VALUE: 17
PIF_VALUE: 17
PIF_VALUE: 18
PIF_VALUE: 0
PIF_VALUE: 17
PIF_VALUE: 17
PIF_VALUE: 18
PIF_VALUE: 18
PIF_VALUE: 17
PIF_VALUE: 17
PIF_VALUE: 18
PIF_VALUE: 18
PIF_VALUE: 2
PIF_VALUE: 0
PIF_VALUE: 15
PIF_VALUE: 17
PIF_VALUE: 18
PIF_VALUE: 17
PIF_VALUE: 18
PIF_VALUE: 1
PIF_VALUE: 1
PIF_VALUE: 15
PIF_VALUE: 13
PIF_VALUE: 18
PIF_VALUE: 17
PIF_VALUE: 18
PIF_VALUE: 16
PIF_VALUE: 17
PIF_VALUE: 18
PIF_VALUE: 18
PIF_VALUE: 17
PIF_VALUE: 17
PIF_VALUE: 1
PIF_VALUE: 17
PIF_VALUE: 27
PIF_VALUE: 17
PIF_VALUE: 0
PIF_VALUE: 17
PIF_VALUE: 18
PIF_VALUE: 0
PIF_VALUE: 1
PIF_VALUE: 18
PIF_VALUE: 17
PIF_VALUE: 18
PIF_VALUE: 17
PIF_VALUE: 17
PIF_VALUE: 0
PIF_VALUE: 18
PIF_VALUE: 18
PIF_VALUE: 17
PIF_VALUE: 18
PIF_VALUE: 17
PIF_VALUE: 18
PIF_VALUE: 17
PIF_VALUE: 18
PIF_VALUE: 1
PIF_VALUE: 15
PIF_VALUE: 18

## 2022-02-10 ASSESSMENT — PAIN SCALES - GENERAL: PAINLEVEL_OUTOF10: 0

## 2022-02-10 NOTE — ANESTHESIA PRE PROCEDURE
Department of Anesthesiology  Preprocedure Note       Name:  Brandi Guillermo   Age:  70 y.o.  :  1950                                          MRN:  [id number]         Date:  2/10/2022      Surgeon: Xin Liu):  Hiram Tan DO    Procedure: Procedure(s):  OPEN UMBILICAL/VENTRAL HERNIA  REPAIR possible MESH    Medications prior to admission:   Prior to Admission medications    Medication Sig Start Date End Date Taking? Authorizing Provider   furosemide (LASIX) 20 MG tablet Take 20 mg by mouth See Admin Instructions Three times a week. Yes Historical Provider, MD   cloNIDine (CATAPRES) 0.1 MG tablet Take 1 tablet by mouth daily 21  Yes JHONNY Emmanuel CNP   rosuvastatin (CRESTOR) 10 MG tablet Take 1 tablet by mouth nightly 21  Yes JHONNY Emmanuel CNP   oxybutynin (DITROPAN XL) 15 MG extended release tablet Take 1 tablet by mouth daily  Patient taking differently: Take 15 mg by mouth as needed  21  Yes Janeen Huffman MD   allopurinol (ZYLOPRIM) 300 MG tablet TAKE 3/4 TABLET BY MOUTH DAILY 9/15/21  Yes JHONNY Emmanuel CNP   amLODIPine (NORVASC) 5 MG tablet Take 2 tablets by mouth daily  Patient taking differently: Take 5 mg by mouth 2 times daily  9/15/21  Yes JHONNY Emmanuel CNP   lisinopril (PRINIVIL;ZESTRIL) 10 MG tablet Takes 15 mg in morning, 20 mg in the evening  Patient taking differently: 20 mg Takes 20 mg in morning, 20 mg in the evening 9/15/21  Yes JHONNY Emmanuel CNP   flecainide (TAMBOCOR) 100 MG tablet Take 1 tablet by mouth 2 times daily 12/23/20 2/10/22 Yes Kandis Brunner, APRN - CNP   zolpidem (AMBIEN) 5 MG tablet Take 5 mg by mouth at bedtime.     Historical Provider, MD   meloxicam (MOBIC) 7.5 MG tablet Take 1 tablet by mouth 2 times daily with food  Patient taking differently: Take 7.5 mg by mouth daily with food 9/15/21   JHONNY Emmanuel CNP   nitroGLYCERIN (NITROSTAT) 0.4 MG SL tablet Place 1 tablet under the tongue every 5 minutes as needed for Chest pain 9/15/21   Javon Dubon, APRN - CNP   Multiple Vitamin (MULTI-VITAMIN) TABS Take 1 tablet by mouth    Historical Provider, MD       Current medications:    Current Facility-Administered Medications   Medication Dose Route Frequency Provider Last Rate Last Admin    0.9 % sodium chloride infusion   IntraVENous Continuous Samantha Chapman MD        lactated ringers infusion   IntraVENous Continuous Samantha Chapman MD        sodium chloride flush 0.9 % injection 10 mL  10 mL IntraVENous 2 times per day Samantha Chapman MD        sodium chloride flush 0.9 % injection 10 mL  10 mL IntraVENous PRN Samantha Chapman MD        0.9 % sodium chloride infusion  25 mL IntraVENous PRN Samantha Chapman MD        bupivacaine (PF) (MARCAINE) 0.25 % injection             lidocaine 1 % injection             fentaNYL (SUBLIMAZE) 100 MCG/2ML injection             midazolam (VERSED) 2 MG/2ML injection             bupivacaine liposome (EXPAREL) 1.3 % injection             bupivacaine (PF) (MARCAINE) 0.5 % injection             bupivacaine (PF) (MARCAINE) 0.5 % injection             ceFAZolin (ANCEF) 2000 mg in dextrose 5 % 50 mL IVPB  2,000 mg IntraVENous On Call to [facility]            Allergies:     Allergies   Allergen Reactions    Phenothiazines Other (See Comments)     Neuromotor reaction called a phenothiazine reaction    Sulfa Antibiotics Other (See Comments)    Prochlorperazine Edisylate Other (See Comments)     Lose control       Problem List:    Patient Active Problem List   Diagnosis Code    Asymptomatic premature ventricular contractions I49.3    Primary insomnia F51.01    Prostate cancer ([facility] C61    Gout M10.9    High triglycerides E78.1    Hypertension I10    Osteoarthrosis M19.90    Hypokalemia E87.6    Atrial flutter with rapid ventricular response (HCC) I48.92    Frequency of micturition R35.0    Urge incontinence N39.41    History of prostate cancer Z85.46    Gross hematuria R31.0    Renal cyst, right N28.1    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    Class 2 obesity in adult E66.9    OAB (overactive bladder) N32.81       Past Medical History:        Diagnosis Date    Abdominal pain 11/14/2018    Arthritis     Back pain     Benign essential HTN 06/01/2018    Caffeine use     1 coffee day    Cough 11/15/2018    Elevated CPK 04/05/2013    Elevated PSA measurement 03/23/2018    Frequent PVCs 06/01/2018    Gout     Hyperlipidemia 11/23/2018    Hypertension     Leukocytosis 11/15/2018    Need for prophylactic vaccination and inoculation against varicella 06/01/2018    Osteoarthritis     Osteoarthrosis 11/23/2018    Primary insomnia     Prostate cancer ([address] 11/04/2018    Rising PSA level 05/10/2018    Sleep apnea        Past Surgical History:        Procedure Laterality Date    CARDIAC CATHETERIZATION      CARDIOVERSION      CARPAL TUNNEL RELEASE      COLONOSCOPY N/A 11/6/2019    COLONOSCOPY DIAGNOSTIC performed by Zack Umanzor MD at [facility]    CYSTOSCOPY  10/08/2020    bladder fulgration    CYSTOSCOPY      CYSTOSCOPY W BIOPSY OF BLADDER N/A 10/8/2020    CYSTOSCOPY, BLADDER BIOPSY Lacinda Listen performed by Nathanial Fabry, MD at [facility] [address]  2012    [place] LAP,PROSTATECTOMY,RADICAL,W/NERVE [address] ROBOTIC N/A 11/9/2018    ROBOTIC RADICAL PROSTATECTOMY WITH PELVIC LYMPH NODES DISSECTION performed by Norbert Webb MD at [address] ENDOSCOPY  11/6/2019    EGD BIOPSY performed by Zack Umanzor MD at [facility]       Social History:    Social History     Tobacco Use    Smoking status: Never Smoker    Smokeless tobacco: Never Used   Substance Use Topics    Alcohol use:  Yes     Alcohol/week: 2.0 standard drinks     Types: 2 Cans of beer per week                                Counseling given: Not Answered      Vital Signs (Current):   Vitals:    02/10/22 0851   BP: (!) 140/87   Pulse: 56   Resp: 14   Temp: 97.5 °F (36.4 °C)   TempSrc: Temporal   SpO2: 100%   Weight: 230 lb (104.3 kg)   Height: 5' 7\" (1.702 m)                                              BP Readings from Last 3 Encounters:   02/10/22 (!) 140/87   01/27/22 (!) 143/87   09/15/21 130/88       NPO Status: Time of last liquid consumption: 2100                        Time of last solid consumption: 2000                        Date of last liquid consumption: 02/09/22                        Date of last solid food consumption: 02/09/22    BMI:   Wt Readings from Last 3 Encounters:   02/10/22 230 lb (104.3 kg)   01/27/22 225 lb (102.1 kg)   09/29/21 235 lb (106.6 kg)     Body mass index is 36.02 kg/m². CBC:   Lab Results   Component Value Date    WBC 4.8 12/20/2021    RBC 4.35 12/20/2021    HGB 13.9 12/20/2021    HCT 42.7 12/20/2021    MCV 98.2 12/20/2021    RDW 13.4 12/20/2021     12/20/2021       CMP:   Lab Results   Component Value Date     12/20/2021    K 4.4 12/20/2021    K 3.5 11/17/2018     12/20/2021    CO2 22 12/20/2021    BUN 18 12/20/2021    CREATININE 1.09 12/20/2021    GFRAA >60 12/20/2021    LABGLOM >60 12/20/2021    LABGLOM 84 11/11/2018    GLUCOSE 107 12/20/2021    PROT 7.5 12/20/2021    CALCIUM 9.8 12/20/2021    BILITOT 0.43 12/20/2021    ALKPHOS 82 12/20/2021    AST 27 12/20/2021    ALT 21 12/20/2021       POC Tests: No results for input(s): POCGLU, POCNA, POCK, POCCL, POCBUN, POCHEMO, POCHCT in the last 72 hours.     Coags:   Lab Results   Component Value Date    PROTIME 9.9 02/26/2020    INR 0.9 02/26/2020    APTT 29.4 02/25/2020       HCG (If Applicable): No results found for: PREGTESTUR, PREGSERUM, HCG, HCGQUANT     ABGs: No results found for: PHART, PO2ART, MMX1NOK, XVJ0MYW, BEART, U5SZZIAQ     Type & Screen (If Applicable):  Lab Results   Component Value Date    LABRH POS 11/09/2018       Drug/Infectious Status (If Applicable):  Lab Results   Component Value Date    HEPCAB NONREACTIVE 06/01/2018       COVID-19 Screening (If Applicable):   Lab Results   Component Value Date    COVID19 Not Detected 02/04/2021           Anesthesia Evaluation  Patient summary reviewed and Nursing notes reviewed  Airway: Mallampati: II  TM distance: >3 FB   Neck ROM: full  Mouth opening: > = 3 FB Dental:      Comment: -MISSING SOME UPPER TEETH BILATERALLY    Pulmonary:normal exam    (+) sleep apnea: on CPAP,                             Cardiovascular:    (+) hypertension:,               Stress test reviewed             ROS comment: -STRESS TEST 2021 - EF-66%, NO ISCHEMIA     Neuro/Psych:                ROS comment: -OSTEOPOROSIS GI/Hepatic/Renal:   (+) GERD: well controlled, morbid obesity          Endo/Other:    (+) : arthritis:., malignancy/cancer. ROS comment: -PROSTATE CANCER  -NPO AFTER MIDNIGHT  -ALLERGIES - PHENOTHIAZIDES, SULFA, PROCHLORPERAZINE Abdominal:             Vascular: negative vascular ROS. Other Findings:             Anesthesia Plan      general and regional     ASA 3     (GETA, RECTUS SHEATH BLOCK)  Induction: intravenous. MIPS: Postoperative opioids intended and Prophylactic antiemetics administered. Anesthetic plan and risks discussed with patient. Plan discussed with CRNA.     Attending anesthesiologist reviewed and agrees with Mirtha Rapp MD   2/10/2022

## 2022-02-10 NOTE — ANESTHESIA PROCEDURE NOTES
Peripheral Block    Patient location during procedure: pre-op  Start time: 2/10/2022 9:48 AM  End time: 2/10/2022 9:51 AM  Staffing  Performed: anesthesiologist   Anesthesiologist: Meño Ndiaye MD  Preanesthetic Checklist  Completed: patient identified, IV checked, site marked, risks and benefits discussed, surgical consent, monitors and equipment checked, pre-op evaluation, timeout performed, anesthesia consent given, oxygen available and patient being monitored  Peripheral Block  Patient position: supine  Prep: ChloraPrep  Patient monitoring: cardiac monitor, continuous pulse ox and frequent blood pressure checks  Block type: Rectus sheath  Laterality: bilateral  Injection technique: single-shot  Guidance: ultrasound guided  Provider prep: mask and sterile gloves  Needle  Needle type: combined needle/nerve stimulator   Needle gauge: 20 G  Needle length: 4 IN.   Needle localization: anatomical landmarks and ultrasound guidance  Assessment  Injection assessment: negative aspiration for heme, no paresthesia on injection and local visualized surrounding nerve on ultrasound  Paresthesia pain: none  Slow fractionated injection: yes  Hemodynamics: stable  Medications Administered  Bupivacaine liposome (EXPAREL) injection 1.3%, 20 mL  bupivacaine (MARCAINE) PF injection 0.5%, 20 mL  Reason for block: post-op pain management and at surgeon's request

## 2022-02-10 NOTE — H&P
[address]      Patient's Name/ Date of Birth/ Gender: Mariano Gutierrez / [birth year] (75 y.o.) / male     Attending physician: Lico Forrester DO    CC: umbilical/ventral hernia    History of present Illness: Mariano Gutierrez is a 70 y.o. male, presents today for hernia repair without mesh. Past Medical History:  has a past medical history of Abdominal pain, Arthritis, Back pain, Benign essential HTN, Caffeine use, Cough, Elevated CPK, Elevated PSA measurement, Frequent PVCs, Gout, Hyperlipidemia, Hypertension, Leukocytosis, Need for prophylactic vaccination and inoculation against varicella, Osteoarthritis, Osteoarthrosis, Primary insomnia, Prostate cancer ([facility] [address] Rising PSA level, and Sleep apnea. Past Surgical History:   Past Surgical History:   Procedure Laterality Date    CARDIAC CATHETERIZATION      CARDIOVERSION      CARPAL TUNNEL RELEASE      COLONOSCOPY N/A 11/6/2019    COLONOSCOPY DIAGNOSTIC performed by Mitch Dorado MD at [facility]    CYSTOSCOPY  10/08/2020    bladder fulgration    CYSTOSCOPY      CYSTOSCOPY W BIOPSY OF BLADDER N/A 10/8/2020    CYSTOSCOPY, BLADDER BIOPSY FULGARATION performed by Cooper Garza MD at [address] [place] [address] [address]  2012    [place] LAP,PROSTATECTOMY,RADICAL,W/NERVE [address] ROBOTIC N/A 11/9/2018    ROBOTIC RADICAL PROSTATECTOMY WITH PELVIC LYMPH NODES DISSECTION performed by Mary Starr MD at [facility]      UPPER GASTROINTESTINAL ENDOSCOPY  11/6/2019    EGD BIOPSY performed by Mitch Dorado MD at [facility]       Social History:  reports that he has never smoked. He has never used smokeless tobacco. He reports current alcohol use of about 2.0 standard drinks of alcohol per week. He reports that he does not use drugs.     Family History: family history includes Arrhythmia in his mother; Cancer in his maternal aunt; Dementia in his mother; Heart Disease in his father; Heart Failure in his father; Hypertension in his sister and sister; Kidney Cancer in his father; Obesity in his sister and sister; Prostate Cancer (age of onset: 76) in his paternal grandfather; Prostate Cancer (age of onset: 71) in his father. Review of Systems:   General: Denies fever, chills, night sweats, weight loss, malaise, fatigue  HEENT: Denies sore throat, sinus problems, allergic rhinosinusitis  Card: Denies chest pain, palpitations, orthopnea/PND. Denies h/o murmurs  Pulm: Denies cough, shortness of breath, [place]  GI:  Denies history of constipation, diarrhea, hematochezia or melena  : Denies polyuria, dysuria, hematuria  Endo: Denies diabetes, thyroid problems. Heme: Denies anemia, h/o bleeding or clotting problems. Neuro: Denies h/o CVA, TIA  Skin: Denies rashes, ulcers  Musculoskeletal: Denies muscle, joint, back pain.     Allergies: Phenothiazines, Sulfa antibiotics, and Prochlorperazine edisylate    Current Meds:  Current Facility-Administered Medications:     0.9 % sodium chloride infusion, , IntraVENous, Continuous, Alena Kirkland MD    lactated ringers infusion, , IntraVENous, Continuous, Alena Kirkland MD    sodium chloride flush 0.9 % injection 10 mL, 10 mL, IntraVENous, 2 times per day, Alena Kirkland MD    sodium chloride flush 0.9 % injection 10 mL, 10 mL, IntraVENous, PRN, Alena Kirkland MD    0.9 % sodium chloride infusion, 25 mL, IntraVENous, PRN, Alena Kirkland MD    bupivacaine (PF) (MARCAINE) 0.25 % injection, , , ,     lidocaine 1 % injection, , , ,     fentaNYL (SUBLIMAZE) 100 MCG/2ML injection, , , ,     midazolam (VERSED) 2 MG/2ML injection, , , ,     bupivacaine liposome (EXPAREL) 1.3 % injection, , , ,     bupivacaine (PF) (MARCAINE) 0.5 % injection, , , ,     bupivacaine (PF) (MARCAINE) 0.5 % injection, , , ,     Vital Signs:  Vitals:    02/10/22 0851   BP: (!) 140/87   Pulse: 56   Resp: 14   Temp: 97.5 °F (36.4 °C)   SpO2: 100% Physical Exam:  Vital signs and Nurse's note reviewed  Gen:  A&Ox3, NAD  HEENT: NCAT, PERRLA, EOMI, no scleral icterus, oral mucosa moist  Neck: Trachea midline without obvious masses or lesions  Chest: Symmetric rise with inhalation, no evidence of trauma  CVS: S1S2  Resp: Good bilateral air entry, no audible wheeze or rhonchi  Abd: soft, stable umbilical hernia compared to previous exam  Ext: No clubbing, cyanosis, edema, peripheral pulses 2+ Rad/Fem/DP/PT  CNS: Moves all extremities, no gross focal motor deficits  Skin: No erythema or ulcerations       Assessment:    Saundra Valdez is a 70 y.o. male with     Umbilical/ventral hernia    Plan:    1.  To OR for hernia repair without mesh, all questions answered, written informed consent obtained      Jus Martinez DO  2/10/2022

## 2022-02-10 NOTE — ANESTHESIA POSTPROCEDURE EVALUATION
Department of Anesthesiology  Postprocedure Note    Patient: Kirsten De La Garza  MRN: [id number]  YOB: 1950  Date of evaluation: 2/10/2022  Time:  11:54 AM     Procedure Summary     Date: 02/10/22 Room / Location: [address] [place] [address] 04 / [address] [facility]    Anesthesia Start: 0451 Anesthesia Stop: 5036    Procedure: OPEN UMBILICAL/VENTRAL HERNIA  REPAIR WITHOUT MESH (N/A ) Diagnosis: (UMBILIAL HERNIA)    Surgeons: Kalli Luna DO Responsible Provider: Maki Lindo MD    Anesthesia Type: general, regional ASA Status: 3          Anesthesia Type: general, regional    Maggy Phase I: Maggy Score: 7    Maggy Phase II:      Last vitals: Reviewed and per EMR flowsheets.        Anesthesia Post Evaluation    Patient location during evaluation: PACU  Patient participation: complete - patient participated  Level of consciousness: awake and alert  Airway patency: patent  Nausea & Vomiting: no nausea and no vomiting  Complications: no  Cardiovascular status: hemodynamically stable  Respiratory status: face mask and spontaneous ventilation  Hydration status: euvolemic  Multimodal analgesia pain management approach

## 2022-02-11 NOTE — OP NOTE
Operative Note      Patient: Brandi Guillermo  YOB: 1950  MRN: [id number]    Date of Procedure: 2/10/2022    Pre-Op Diagnosis: UMBILIAL HERNIA    Post-Op Diagnosis: incarcerated umbilical hernia, fascial defect 1cm       Procedure(s):  OPEN UMBILICAL/VENTRAL HERNIA  REPAIR WITHOUT MESH    Surgeon(s):  Hiram Tan DO    Assistant:   Physician Assistant: Lory Dakins, PA-C    Anesthesia: General    Estimated Blood Loss (mL): less than 5cc    Complications: None    Specimens:   * No specimens in log *    Implants:  * No implants in log *      Drains: * No LDAs found *    Findings: as above. Wound class 1    HISTORY: The patient is a 70y.o. year old male with history of above preop diagnosis. The risk, benefits, expected outcome, and alternatives to the procedure were explained to the patient who expresses understanding and is in agreement. Operative detail:    Patient was brought to the operative suite and placed on the operating table in supine position. Timeout was performed verifying correct patient, position, equipment and procedure to be performed. Preoperative antibiotics were infused. General anesthesia was induced with endotracheal intubation. The patient's abdomen was prepped and draped in the usual sterile fashion. #15 scalpel was used to make a curvilinear incision at the distal aspect of the patient's umbilicus, blunt dissection was carried down to the fascial layer using hemostats. The umbilical stalk was carefully palpated and found to be widened but empty at the midsection, it was transected here with electrocautery, there was a generous amount of preperitoneal fat that was adherent at multiple sites to the surrounding stalk and fascia, this as circumferentially freed and transected with excellent hemostasis. The fascial defect was 1cm in diameter.      The fascial defect was closed using inverted vertical mattress sutures of 0-PDS and the fascia imbricated above and below the defect with the same suture to relieve tension off of the midline. The overlying tissues were closed with 3-0 vicryl. 3-0 vicryl was then used to close the deep dermal layer with interrupted sutures. 4-0 monocryl was used to reapproximate the skin edges with running subcuticular stitch. The area was locally anesthetized with 1% lidocaine with epinephrine and 0.25% marcaine without epinephrine. The area was then cleansed, incision covered with skin glueThe patient tolerated the procedure well without complications. All sponge needle and instrument counts were correct at the end of the case. He was transferred back to PACU in stable condition for further recovery.     Electronically signed by Deangelo Haji DO on 2/11/2022 at 11:18 AM

## 2022-02-14 NOTE — PROGRESS NOTES
CLINICAL PHARMACY NOTE: MEDS TO BEDS    Total # of Prescriptions Filled: 2   The following medications were delivered to the patient:  · Acetaminophen 300-30  · Cycobenzaprine 5mg    Additional Documentation:

## 2022-02-21 ENCOUNTER — OFFICE VISIT (OUTPATIENT)
Dept: SURGERY | Age: 72
End: 2022-02-21

## 2022-02-21 VITALS — BODY MASS INDEX: 36.1 KG/M2 | WEIGHT: 230 LBS | RESPIRATION RATE: 12 BRPM | HEIGHT: 67 IN | HEART RATE: 80 BPM

## 2022-02-21 DIAGNOSIS — Z09 STATUS POST UMBILICAL HERNIA REPAIR, FOLLOW-UP EXAM: Primary | ICD-10-CM

## 2022-02-21 DIAGNOSIS — E66.01 SEVERE OBESITY (BMI 35.0-39.9) WITH COMORBIDITY (HCC): ICD-10-CM

## 2022-02-21 PROCEDURE — 99024 POSTOP FOLLOW-UP VISIT: CPT | Performed by: SURGERY

## 2022-02-22 NOTE — PROGRESS NOTES
[facility] General Surgery [facility]  Progress Note    PATIENT NAME: Polo Mustafa VISIT DATE: 2/21/2022    SUBJECTIVE:  Misti Bermeo is a 70 y.o. male who presents to the clinic today for follow up to open umbilical hernia primary repair  performed 2/11/22. Pt reports some minimal serous drainage since surgery, denies any postop pain at this time, feels some firmness at the umbilicus. Otherwise pt is tolerating regular diet and having normal BM. OBJECTIVE:    Pulse 80   Resp 12   Ht 5' 7\" (1.702 m)   Wt 230 lb (104.3 kg)   BMI 36.02 kg/m²     General Appearance:  awake, alert, no acute distress, well developed, well nourished   Skin:  Skin color, texture, turgor normal. No rashes or lesions. Lungs:  Normal chest expansion, unlabored breathing without accessory muscle use. No audible rales, rhonchi, or wheezing. Cardiovascular: S1S2. No evidence of JVD. No evidence of pulsatile masses in abdomen  Abdomen:  Soft. Incision C/D/I without evidence of bleeding/infection. There is a firm protrusion at the umbilicus without overlying skin change, no evidence of erythema or resolving ecchymosis, again nontender. Musculoskeletal: No evidence of bony/muscular deformities, trauma, atrophy of either left/right upper/lower extremity. No evidence of digital clubbing or cyanosis. ASSESSMENT:  1. Status post umbilical hernia repair, follow-up exam    2. Severe obesity (BMI 35.0-39. 9) with comorbidity ([address]          PLAN:  1. Unclear etiology for protrusion at the umbilicus, grossly not consistent with seroma formation but also clinical history does not suggest recurrence of umbilical hernia. Discussed with pt, will obtain noncontrast CT of the abdominal wall to further delineate.     Electronically signed by Mercedes Cm DO on 2/22/2022 at 9:27 AM

## 2022-02-24 ENCOUNTER — HOSPITAL ENCOUNTER (OUTPATIENT)
Dept: CT IMAGING | Age: 72
Discharge: HOME | End: 2022-02-26
Payer: COMMERCIAL

## 2022-02-24 DIAGNOSIS — Z09 STATUS POST UMBILICAL HERNIA REPAIR, FOLLOW-UP EXAM: ICD-10-CM

## 2022-02-24 PROCEDURE — 74150 CT ABDOMEN W/O CONTRAST: CPT

## 2022-02-28 ENCOUNTER — TELEPHONE (OUTPATIENT)
Dept: PRIMARY CARE CLINIC | Age: 72
End: 2022-02-28

## 2022-02-28 NOTE — TELEPHONE ENCOUNTER
Per the [place] [employer]; In general, for patients with prosthetic joint implants, prophylactic antibiotics are not recommended prior to dental procedures to prevent prosthetic joint infection.       Patient is scheduled for a dental procedure at [facility] and [address] [place] [address] on 3/1/2022. Patient has a hip replacement 12 yrs. Ago and dental office would like to know if pre med clearance needs to be given. Patient does have future appt scheduled on 3/2/2022 with pcp, document has been scanned into chart for provider to review.  Dental office would like call back with response, being that patient is scheduled in the morning,at 10:50 am    Health Maintenance   Topic Date Due    Lipid screen  02/05/2022    Depression Screen  03/25/2022    Potassium monitoring  12/20/2022    Creatinine monitoring  12/20/2022    PSA counseling  12/20/2022    DTaP/Tdap/Td vaccine (2 - Td or Tdap) 05/10/2027    Colorectal Cancer Screen  11/06/2029    Flu vaccine  Completed    Shingles Vaccine  Completed    Pneumococcal 65+ years Vaccine  Completed    COVID-19 Vaccine  Completed    Hepatitis C screen  Completed    Hepatitis A vaccine  Aged Out    Hepatitis B vaccine  Aged Out    Hib vaccine  Aged Out    Meningococcal (ACWY) vaccine  Aged Out             (applicable per patient's age: Cancer Screenings, Depression Screening, Fall Risk Screening, Immunizations)    Hemoglobin A1C (%)   Date Value   09/05/2019 5.2   03/26/2019 5.7     LDL Cholesterol (mg/dL)   Date Value   02/05/2021 67     AST (U/L)   Date Value   12/20/2021 27     ALT (U/L)   Date Value   12/20/2021 21     BUN (mg/dL)   Date Value   12/20/2021 18      (goal A1C is < 7)   (goal LDL is <100) need 30-50% reduction from baseline     BP Readings from Last 3 Encounters:   02/10/22 116/70   02/10/22 124/72   01/27/22 (!) 143/87    (goal /80)      All Future Testing planned in [facility]:  Lab Frequency Next Occurrence   PSA, Diagnostic Once 09/20/2021 PSA, Diagnostic Once 03/28/2022       Next Visit Date:  Future Appointments   Date Time Provider Carol Seema   3/2/2022  2:30 PM JHONNY Vitale - CNP [facility]   4/27/2022  2:10 PM Sharlene Franks MD [facility]            Patient Active Problem List:     Asymptomatic premature ventricular contractions     Primary insomnia     Prostate cancer ([facility] [address]     Gout     High triglycerides     Hypertension     Osteoarthrosis     Hypokalemia     Atrial flutter with rapid ventricular response (HCC)     Frequency of micturition     Urge incontinence     History of prostate cancer     Gross hematuria     Renal cyst, right     Radiation cystitis     Class 2 obesity in adult     OAB (overactive bladder)

## 2022-03-02 ENCOUNTER — OFFICE VISIT (OUTPATIENT)
Dept: PRIMARY CARE CLINIC | Age: 72
End: 2022-03-02
Payer: MEDICARE

## 2022-03-02 ENCOUNTER — HOSPITAL ENCOUNTER (OUTPATIENT)
Age: 72
Discharge: HOME | End: 2022-03-02
Payer: COMMERCIAL

## 2022-03-02 VITALS
TEMPERATURE: 98.3 F | WEIGHT: 235 LBS | OXYGEN SATURATION: 95 % | HEART RATE: 61 BPM | SYSTOLIC BLOOD PRESSURE: 137 MMHG | DIASTOLIC BLOOD PRESSURE: 82 MMHG | BODY MASS INDEX: 36.81 KG/M2

## 2022-03-02 DIAGNOSIS — Z13.1 SCREENING FOR DIABETES MELLITUS: ICD-10-CM

## 2022-03-02 DIAGNOSIS — R73.9 HYPERGLYCEMIA: ICD-10-CM

## 2022-03-02 DIAGNOSIS — Z00.00 ENCOUNTER FOR WELL ADULT EXAM WITHOUT ABNORMAL FINDINGS: Primary | ICD-10-CM

## 2022-03-02 DIAGNOSIS — M25.50 ARTHRALGIA OF MULTIPLE JOINTS: ICD-10-CM

## 2022-03-02 DIAGNOSIS — E78.00 PURE HYPERCHOLESTEROLEMIA: ICD-10-CM

## 2022-03-02 DIAGNOSIS — I10 ESSENTIAL HYPERTENSION: ICD-10-CM

## 2022-03-02 DIAGNOSIS — E79.0 HYPERURICEMIA: ICD-10-CM

## 2022-03-02 DIAGNOSIS — Z76.0 MEDICATION REFILL: ICD-10-CM

## 2022-03-02 LAB
CHOLESTEROL/HDL RATIO: 3.1
CHOLESTEROL: 162 MG/DL
ESTIMATED AVERAGE GLUCOSE: 117 MG/DL
HBA1C MFR BLD: 5.7 % (ref 4–6)
HDLC SERPL-MCNC: 52 MG/DL
LDL CHOLESTEROL: 79 MG/DL (ref 0–130)
TRIGL SERPL-MCNC: 154 MG/DL

## 2022-03-02 PROCEDURE — 83036 HEMOGLOBIN GLYCOSYLATED A1C: CPT

## 2022-03-02 PROCEDURE — G8484 FLU IMMUNIZE NO ADMIN: HCPCS | Performed by: NURSE PRACTITIONER

## 2022-03-02 PROCEDURE — 36415 COLL VENOUS BLD VENIPUNCTURE: CPT

## 2022-03-02 PROCEDURE — 80061 LIPID PANEL: CPT

## 2022-03-02 PROCEDURE — G0402 INITIAL PREVENTIVE EXAM: HCPCS | Performed by: NURSE PRACTITIONER

## 2022-03-02 RX ORDER — CLONIDINE HYDROCHLORIDE 0.1 MG/1
0.1 TABLET ORAL 2 TIMES DAILY
Qty: 180 TABLET | Refills: 1 | Status: SHIPPED | OUTPATIENT
Start: 2022-03-02 | End: 2022-08-17 | Stop reason: SDUPTHER

## 2022-03-02 RX ORDER — ALLOPURINOL 300 MG/1
TABLET ORAL
Qty: 90 TABLET | Refills: 1 | Status: SHIPPED | OUTPATIENT
Start: 2022-03-02 | End: 2022-08-17 | Stop reason: SDUPTHER

## 2022-03-02 RX ORDER — AMLODIPINE BESYLATE 5 MG/1
5 TABLET ORAL 2 TIMES DAILY
Qty: 180 TABLET | Refills: 1 | Status: SHIPPED | OUTPATIENT
Start: 2022-03-02 | End: 2022-08-17 | Stop reason: SDUPTHER

## 2022-03-02 RX ORDER — ROSUVASTATIN CALCIUM 10 MG/1
10 TABLET, COATED ORAL NIGHTLY
Qty: 90 TABLET | Refills: 1 | Status: SHIPPED | OUTPATIENT
Start: 2022-03-02 | End: 2022-08-17 | Stop reason: SDUPTHER

## 2022-03-02 RX ORDER — OXYBUTYNIN CHLORIDE 15 MG/1
15 TABLET, EXTENDED RELEASE ORAL DAILY
Qty: 90 TABLET | Refills: 1 | Status: SHIPPED | OUTPATIENT
Start: 2022-03-02 | End: 2022-06-27 | Stop reason: SDUPTHER

## 2022-03-02 RX ORDER — LISINOPRIL 10 MG/1
20 TABLET ORAL 2 TIMES DAILY
Qty: 180 TABLET | Refills: 1 | Status: SHIPPED | OUTPATIENT
Start: 2022-03-02 | End: 2022-03-07 | Stop reason: SDUPTHER

## 2022-03-02 RX ORDER — ZOLPIDEM TARTRATE 5 MG/1
10 TABLET ORAL NIGHTLY
Qty: 180 TABLET | Refills: 1 | Status: SHIPPED | OUTPATIENT
Start: 2022-03-02 | End: 2022-04-01

## 2022-03-02 RX ORDER — MELOXICAM 7.5 MG/1
7.5 TABLET ORAL DAILY
Qty: 90 TABLET | Refills: 1 | Status: SHIPPED | OUTPATIENT
Start: 2022-03-02 | End: 2022-08-17 | Stop reason: SDUPTHER

## 2022-03-02 SDOH — ECONOMIC STABILITY: FOOD INSECURITY: WITHIN THE PAST 12 MONTHS, THE FOOD YOU BOUGHT JUST DIDN'T LAST AND YOU DIDN'T HAVE MONEY TO GET MORE.: NEVER TRUE

## 2022-03-02 SDOH — ECONOMIC STABILITY: FOOD INSECURITY: WITHIN THE PAST 12 MONTHS, YOU WORRIED THAT YOUR FOOD WOULD RUN OUT BEFORE YOU GOT MONEY TO BUY MORE.: NEVER TRUE

## 2022-03-02 ASSESSMENT — ENCOUNTER SYMPTOMS
COUGH: 0
NAUSEA: 0
DIARRHEA: 0
CONSTIPATION: 0
SHORTNESS OF BREATH: 0
ABDOMINAL PAIN: 0
CHEST TIGHTNESS: 0

## 2022-03-02 ASSESSMENT — PATIENT HEALTH QUESTIONNAIRE - PHQ9
SUM OF ALL RESPONSES TO PHQ9 QUESTIONS 1 & 2: 0
SUM OF ALL RESPONSES TO PHQ QUESTIONS 1-9: 0
SUM OF ALL RESPONSES TO PHQ QUESTIONS 1-9: 0
2. FEELING DOWN, DEPRESSED OR HOPELESS: 0
1. LITTLE INTEREST OR PLEASURE IN DOING THINGS: 0
SUM OF ALL RESPONSES TO PHQ QUESTIONS 1-9: 0
SUM OF ALL RESPONSES TO PHQ QUESTIONS 1-9: 0

## 2022-03-02 ASSESSMENT — SOCIAL DETERMINANTS OF HEALTH (SDOH): HOW HARD IS IT FOR YOU TO PAY FOR THE VERY BASICS LIKE FOOD, HOUSING, MEDICAL CARE, AND HEATING?: NOT HARD AT ALL

## 2022-03-02 NOTE — PROGRESS NOTES
Well Adult Note  Name: Aniyah Taylor Date: 3/10/2022   MRN: [id number] Sex: Male   Age: 70 y.o. Ethnicity: Non- / Non    : 1950 Race: White (non-)      Lisa Chu is here for well adult exam.  History:  Does see cardio  Is working out a little bit- hasn't lost much weight  Retired  Trying to get better sleep- ambien prn- uses cpap        Review of Systems   Constitutional: Negative for activity change, appetite change, chills and fever. HENT: Negative for congestion, ear pain and hearing loss. Eyes: Negative for visual disturbance. Respiratory: Negative for cough, chest tightness and shortness of breath. Cardiovascular: Negative for chest pain, palpitations and leg swelling. Gastrointestinal: Negative for abdominal pain, constipation, diarrhea and nausea. Genitourinary: Negative for frequency and penile discharge. Musculoskeletal: Negative for arthralgias and myalgias. Skin: Negative for rash. Neurological: Negative for dizziness, light-headedness and headaches. Psychiatric/Behavioral: Negative for dysphoric mood and self-injury. Allergies   Allergen Reactions    Phenothiazines Other (See Comments)     Neuromotor reaction called a phenothiazine reaction    Sulfa Antibiotics Other (See Comments)    Prochlorperazine Edisylate Other (See Comments)     Lose control         Prior to Visit Medications    Medication Sig Taking?  Authorizing Provider   cloNIDine (CATAPRES) 0.1 MG tablet Take 1 tablet by mouth 2 times daily Yes Citlalli Dry, APRN - CNP   rosuvastatin (CRESTOR) 10 MG tablet Take 1 tablet by mouth nightly Yes Citlalli Dry, APRN - CNP   meloxicam (MOBIC) 7.5 MG tablet Take 1 tablet by mouth daily with food Yes Citlalli Dry, APRN - CNP   allopurinol (ZYLOPRIM) 300 MG tablet TAKE 3/4 TABLET BY MOUTH DAILY Yes Citlalli Dry, APRN - CNP   amLODIPine (NORVASC) 5 MG tablet Take 1 tablet by mouth 2 times daily Yes JHONNY Tabares CNP   oxybutynin (DITROPAN XL) 15 MG extended release tablet Take 1 tablet by mouth daily Yes JHONNY Tabares CNP   zolpidem (AMBIEN) 5 MG tablet Take 2 tablets by mouth at bedtime for 30 days. Yes JHONNY Tabares CNP   furosemide (LASIX) 20 MG tablet Take 10 mg by mouth See Admin Instructions Three times a week.   Yes Historical Provider, MD   nitroGLYCERIN (NITROSTAT) 0.4 MG SL tablet Place 1 tablet under the tongue every 5 minutes as needed for Chest pain Yes JHONNY Tabares CNP   Multiple Vitamin (MULTI-VITAMIN) TABS Take 1 tablet by mouth Yes Historical Provider, MD   lisinopril (PRINIVIL;ZESTRIL) 10 MG tablet Take 2 tablets by mouth in the morning and at bedtime Takes 20 mg in morning, 20 mg in the evening  JHONNY Tabares CNP   flecainide (TAMBOCOR) 100 MG tablet Take 1 tablet by mouth 2 times daily  JHONNY Tabares CNP         Past Medical History:   Diagnosis Date    Abdominal pain 11/14/2018    Arthritis     Back pain     Benign essential HTN 06/01/2018    Caffeine use     1 coffee day    Cough 11/15/2018    Elevated CPK 04/05/2013    Elevated PSA measurement 03/23/2018    Frequent PVCs 06/01/2018    Gout     Hyperlipidemia 11/23/2018    Hypertension     Leukocytosis 11/15/2018    Need for prophylactic vaccination and inoculation against varicella 06/01/2018    Osteoarthritis     Osteoarthrosis 11/23/2018    Primary insomnia     Prostate cancer ([facility] [address] 11/04/2018    Rising PSA level 05/10/2018    Sleep apnea        Past Surgical History:   Procedure Laterality Date    CARDIAC CATHETERIZATION      CARDIOVERSION      CARPAL TUNNEL RELEASE      COLONOSCOPY N/A 11/6/2019    COLONOSCOPY DIAGNOSTIC performed by Brijesh Lee MD at [facility]    CYSTOSCOPY  10/08/2020    bladder fulgration    CYSTOSCOPY      CYSTOSCOPY W BIOPSY OF BLADDER N/A 10/8/2020    CYSTOSCOPY, BLADDER BIOPSY FULGARATION performed by Nathanial Fabry, MD at [facility]  2012    [place] LAP,PROSTATECTOMY,RADICAL,W/NERVE SPARE,INCL ROBOTIC N/A 11/9/2018    ROBOTIC RADICAL PROSTATECTOMY WITH PELVIC LYMPH NODES DISSECTION performed by Norbert Webb MD at [facility]      UMBILICAL HERNIA REPAIR  74/87/2309    umbilical/ventral hernia repair possible mesh    UMBILICAL HERNIA REPAIR N/A 0/75/2329    OPEN UMBILICAL/VENTRAL HERNIA  REPAIR WITHOUT MESH performed by Martina Sharma DO at [address] [facility]  11/6/2019    EGD BIOPSY performed by Zack Umanzor MD at [facility]         Family History   Problem Relation Age of Onset    Arrhythmia Mother     Dementia Mother     Prostate Cancer Father 71    Kidney Cancer Father         62s     Heart Failure Father     Heart Disease Father     Obesity Sister     Hypertension Sister     Obesity Sister     Hypertension Sister     Prostate Cancer Paternal Grandfather 76    Cancer Maternal Aunt         d. 48 unknown cancer        Social History     Tobacco Use    Smoking status: Never Smoker    Smokeless tobacco: Never Used   Vaping Use    Vaping Use: Never used   Substance Use Topics    Alcohol use: Yes     Alcohol/week: 2.0 standard drinks     Types: 2 Cans of beer per week    Drug use: Never       Objective   /82 (Site: Left Upper Arm, Position: Sitting, Cuff Size: Large Adult)   Pulse 61   Temp 98.3 °F (36.8 °C)   Wt 235 lb (106.6 kg)   SpO2 95%   BMI 36.81 kg/m²   Wt Readings from Last 3 Encounters:   03/02/22 235 lb (106.6 kg)   02/21/22 230 lb (104.3 kg)   02/10/22 230 lb (104.3 kg)     There were no vitals filed for this visit. Physical Exam  Constitutional:       Appearance: He is well-developed. HENT:      Head: Normocephalic and atraumatic.       Right Ear: External ear normal.      Left Ear: External ear normal. Nose: Nose normal.   Eyes:      Conjunctiva/sclera: Conjunctivae normal.      Pupils: Pupils are equal, round, and reactive to light. Cardiovascular:      Rate and Rhythm: Normal rate and regular rhythm. Heart sounds: Normal heart sounds. Pulmonary:      Effort: Pulmonary effort is normal.      Breath sounds: Normal breath sounds. Abdominal:      General: Bowel sounds are normal.      Palpations: Abdomen is soft. Musculoskeletal:         General: Normal range of motion. Cervical back: Normal range of motion. Comments: Pain free ROM     Skin:     General: Skin is warm and dry. Findings: No rash. Neurological:      Mental Status: He is alert and oriented to person, place, and time. Comments: Gait normal     Psychiatric:         Behavior: Behavior normal.         Thought Content: Thought content normal.         Judgment: Judgment normal.           Assessment   Plan   1. Encounter for well adult exam without abnormal findings  -     Lipid Panel; Future  2. Essential hypertension  -     cloNIDine (CATAPRES) 0.1 MG tablet; Take 1 tablet by mouth 2 times daily, Disp-180 tablet, R-1Normal  -     amLODIPine (NORVASC) 5 MG tablet; Take 1 tablet by mouth 2 times daily, Disp-180 tablet, R-1Normal  3. Medication refill  -     cloNIDine (CATAPRES) 0.1 MG tablet; Take 1 tablet by mouth 2 times daily, Disp-180 tablet, R-1Normal  -     rosuvastatin (CRESTOR) 10 MG tablet; Take 1 tablet by mouth nightly, Disp-90 tablet, R-1Normal  -     meloxicam (MOBIC) 7.5 MG tablet; Take 1 tablet by mouth daily with food, Disp-90 tablet, R-1Normal  -     allopurinol (ZYLOPRIM) 300 MG tablet; TAKE 3/4 TABLET BY MOUTH DAILY, Disp-90 tablet, R-1Normal  -     amLODIPine (NORVASC) 5 MG tablet; Take 1 tablet by mouth 2 times daily, Disp-180 tablet, R-1Normal  -     zolpidem (AMBIEN) 5 MG tablet; Take 2 tablets by mouth at bedtime for 30 days. , Disp-180 tablet, R-1Normal  4.  Pure hypercholesterolemia  - rosuvastatin (CRESTOR) 10 MG tablet; Take 1 tablet by mouth nightly, Disp-90 tablet, R-1Normal  5. Arthralgia of multiple joints  -     meloxicam (MOBIC) 7.5 MG tablet; Take 1 tablet by mouth daily with food, Disp-90 tablet, R-1Normal  6. Hyperuricemia  -     allopurinol (ZYLOPRIM) 300 MG tablet; TAKE 3/4 TABLET BY MOUTH DAILY, Disp-90 tablet, R-1Normal  7. Screening for diabetes mellitus  8.  Hyperglycemia  -     Hemoglobin A1C; Future         Personalized Preventive Plan   Current Health Maintenance Status  Immunization History   Administered Date(s) Administered    COVID-19, Pfizer Purple top, DILUTE for use, 12+ yrs, 30mcg/0.3mL dose 12/15/2020, 12/17/2020, 01/07/2021, 01/07/2021, 10/12/2021    Hepatitis A Adult (Havrix, Vaqta) 08/08/2012    Hepatitis B 05/09/2012, 06/04/2012, 11/13/2012, 01/14/2013    Hepatitis B Adult (Recombivax HB) 05/09/2012, 06/04/2012, 11/13/2012, 01/14/2013    Hepatitis B vaccine 05/09/2012, 06/04/2012, 11/13/2012, 01/14/2013    Influenza A ([id number]) Vaccine PF IM 11/06/2009    Influenza Vaccine, unspecified formulation 08/30/2012, 10/11/2013, 10/10/2014    Influenza Virus Vaccine 08/30/2012, 10/11/2013, 10/10/2014    Influenza, High Dose (Fluzone 65 yrs and older) 10/06/2015, 10/11/2016, 09/18/2017, 09/12/2018    Influenza, Quadv, IM, PF (6 mo and older Fluzone, Flulaval, Fluarix, and 3 yrs and older Afluria) 10/10/2014    Influenza, Quadv, adjuvanted, 65 yrs +, IM, PF (Fluad) 10/15/2020, 09/15/2021    Influenza, Triv, inactivated, subunit, adjuvanted, IM (Fluad 65 yrs and older) 10/21/2019    MMR 10/22/2007, 06/04/2012    PPD Test 10/23/2017    Pneumococcal Conjugate 13-valent ([id number]) 10/06/2015    Pneumococcal Polysaccharide ([id number]) 08/30/2012, 09/18/2017    Tdap (Boostrix, Adacel) 05/10/2017    Varicella (Varivax) 08/17/2016    Zoster Recombinant (Shingrix) 06/02/2018, 08/24/2018        Health Maintenance   Topic Date Due    Annual Wellness Visit (AWV) 03/02/2022    Potassium monitoring  12/20/2022    Creatinine monitoring  12/20/2022    PSA counseling  12/20/2022    A1C test (Diabetic or Prediabetic)  03/02/2023    Lipid screen  03/02/2023    Depression Screen  03/02/2023    DTaP/Tdap/Td vaccine (2 - Td or Tdap) 05/10/2027    Colorectal Cancer Screen  11/06/2029    Flu vaccine  Completed    Shingles Vaccine  Completed    Pneumococcal 65+ years Vaccine  Completed    COVID-19 Vaccine  Completed    Hepatitis C screen  Completed    Hepatitis A vaccine  Aged Out    Hepatitis B vaccine  Aged Out    Hib vaccine  Aged Out    Meningococcal (ACWY) vaccine  Aged Out     Recommendations for Trellise Due: see orders and patient instructions/AVS.    No follow-ups on file.

## 2022-03-07 DIAGNOSIS — I10 ESSENTIAL HYPERTENSION: ICD-10-CM

## 2022-03-07 DIAGNOSIS — Z76.0 MEDICINE REFILL: ICD-10-CM

## 2022-03-07 DIAGNOSIS — Z76.0 MEDICATION REFILL: ICD-10-CM

## 2022-03-07 RX ORDER — FLECAINIDE ACETATE 100 MG/1
100 TABLET ORAL 2 TIMES DAILY
Qty: 180 TABLET | Refills: 1 | Status: SHIPPED | OUTPATIENT
Start: 2022-03-07 | End: 2022-08-17 | Stop reason: SDUPTHER

## 2022-03-07 RX ORDER — LISINOPRIL 10 MG/1
20 TABLET ORAL 2 TIMES DAILY
Qty: 180 TABLET | Refills: 1 | Status: SHIPPED | OUTPATIENT
Start: 2022-03-07 | End: 2022-03-09 | Stop reason: SDUPTHER

## 2022-03-07 NOTE — TELEPHONE ENCOUNTER
----- Message from Vashti Duran sent at 3/3/2022 12:13 PM EST -----  Subject: Refill Request    QUESTIONS  Name of Medication? flecainide (TAMBOCOR) 100 MG tablet  Patient-reported dosage and instructions? 100mg, one tablet twice daily   How many days do you have left? 0  Preferred Pharmacy? [facility] phone number (if available)? [phone]  Additional Information for Provider? 90 day supply, 180 tablets direct   phone number for [facility] pharmacy? [phone]  ---------------------------------------------------------------------------  --------------,  Name of Medication? lisinopril (PRINIVIL;ZESTRIL) 10 MG tablet  Patient-reported dosage and instructions? 10mg, two tablets in morning and   two tablets at night   How many days do you have left? 0  Preferred Pharmacy? [facility] phone number (if available)? [phone]  Additional Information for Provider? 90 day supply, 360 tablets direct   phone number for [facility] pharmacy? [phone]  ---------------------------------------------------------------------------  --------------  Justyna HOLT  What is the best way for the office to contact you? OK to leave message on   voicemail  Preferred Call Back Phone Number?  [phone]

## 2022-03-07 NOTE — TELEPHONE ENCOUNTER
----- Message from Yudi Louis sent at 3/3/2022 12:13 PM EST -----  Subject: Refill Request    QUESTIONS  Name of Medication? flecainide (TAMBOCOR) 100 MG tablet  Patient-reported dosage and instructions? 100mg, one tablet twice daily   How many days do you have left? 0  Preferred Pharmacy? [facility] phone number (if available)? [phone]  Additional Information for Provider? 90 day supply, 180 tablets direct   phone number for [facility] pharmacy? [phone]  ---------------------------------------------------------------------------  --------------,  Name of Medication? lisinopril (PRINIVIL;ZESTRIL) 10 MG tablet  Patient-reported dosage and instructions? 10mg, two tablets in morning and   two tablets at night   How many days do you have left? 0  Preferred Pharmacy? [facility] phone number (if available)? [phone]  Additional Information for Provider? 90 day supply, 360 tablets direct   phone number for [facility] pharmacy? [phone]  ---------------------------------------------------------------------------  --------------  Wilmer HOLT  What is the best way for the office to contact you? OK to leave message on   voicemail  Preferred Call Back Phone Number?  [phone]

## 2022-03-09 DIAGNOSIS — I10 ESSENTIAL HYPERTENSION: ICD-10-CM

## 2022-03-09 DIAGNOSIS — Z76.0 MEDICATION REFILL: ICD-10-CM

## 2022-03-09 RX ORDER — LISINOPRIL 10 MG/1
20 TABLET ORAL 2 TIMES DAILY
Qty: 360 TABLET | Refills: 1 | Status: SHIPPED | OUTPATIENT
Start: 2022-03-09 | End: 2022-08-16

## 2022-03-14 ENCOUNTER — TELEPHONE (OUTPATIENT)
Dept: PRIMARY CARE CLINIC | Age: 72
End: 2022-03-14

## 2022-03-14 NOTE — TELEPHONE ENCOUNTER
Received written communication from insurance company regarding Zolpidem 5mg. Insurance will only cover 30 tablets every 30 days. If patient requires more medication than approved, please write letter stating the need.

## 2022-04-19 ENCOUNTER — TELEPHONE (OUTPATIENT)
Dept: PRIMARY CARE CLINIC | Age: 72
End: 2022-04-19

## 2022-04-19 ENCOUNTER — HOSPITAL ENCOUNTER (OUTPATIENT)
Age: 72
Discharge: HOME | End: 2022-04-19
Payer: MEDICARE

## 2022-04-19 DIAGNOSIS — Z85.46 HISTORY OF PROSTATE CANCER: ICD-10-CM

## 2022-04-19 LAB — PROSTATE SPECIFIC ANTIGEN: <0.02 UG/L

## 2022-04-19 PROCEDURE — 36415 COLL VENOUS BLD VENIPUNCTURE: CPT

## 2022-04-19 PROCEDURE — 84153 ASSAY OF PSA TOTAL: CPT

## 2022-04-19 NOTE — TELEPHONE ENCOUNTER
[facility] office called to get a verbal clearance for patient to get in implant dental procedure done. Patient has a appointment at [place] [address] today and they need this asap. They need to know if patient need a antibiotic before the procedure. If so they do have the medication at the office and can give it to the patient before.  Please call  [phone] Nurse

## 2022-06-22 DIAGNOSIS — G47.00 INSOMNIA, UNSPECIFIED TYPE: Primary | ICD-10-CM

## 2022-06-22 NOTE — TELEPHONE ENCOUNTER
Patient would like a refill of [place] [facility]. Pt states that Mauricio Hodgkins has filled this for him in the past, although most recent refill seems to be from another provider.

## 2022-06-23 RX ORDER — ZOLPIDEM TARTRATE 5 MG/1
10 TABLET ORAL NIGHTLY PRN
Qty: 180 TABLET | Refills: 0 | Status: SHIPPED | OUTPATIENT
Start: 2022-06-23 | End: 2022-08-16

## 2022-07-01 DIAGNOSIS — A04.72 CLOSTRIDIUM DIFFICILE DIARRHEA: Primary | ICD-10-CM

## 2022-07-01 RX ORDER — VANCOMYCIN HYDROCHLORIDE 250 MG/1
250 CAPSULE ORAL 4 TIMES DAILY
Qty: 40 CAPSULE | Refills: 0 | Status: SHIPPED | OUTPATIENT
Start: 2022-07-01 | End: 2022-07-11

## 2022-07-01 NOTE — TELEPHONE ENCOUNTER
Patient called and stated his c-diff is back and with holiday weekend needs abx. He was last seen . I made a f/u appt and did a pending order for the [facility] which he was on prior. If agree please sign.

## 2022-07-18 DIAGNOSIS — A04.72 CLOSTRIDIUM DIFFICILE DIARRHEA: ICD-10-CM

## 2022-07-21 ENCOUNTER — OFFICE VISIT (OUTPATIENT)
Dept: INFECTIOUS DISEASES | Age: 72
End: 2022-07-21
Payer: MEDICARE

## 2022-07-21 VITALS
SYSTOLIC BLOOD PRESSURE: 108 MMHG | HEIGHT: 68 IN | TEMPERATURE: 97 F | DIASTOLIC BLOOD PRESSURE: 70 MMHG | WEIGHT: 231 LBS | BODY MASS INDEX: 35.01 KG/M2 | HEART RATE: 74 BPM

## 2022-07-21 DIAGNOSIS — N30.40 RADIATION CYSTITIS: ICD-10-CM

## 2022-07-21 DIAGNOSIS — A04.72 CLOSTRIDIUM DIFFICILE DIARRHEA: Primary | ICD-10-CM

## 2022-07-21 DIAGNOSIS — N39.41 URGE INCONTINENCE: ICD-10-CM

## 2022-07-21 DIAGNOSIS — Z85.46 HISTORY OF PROSTATE CANCER: ICD-10-CM

## 2022-07-21 DIAGNOSIS — I48.92 ATRIAL FLUTTER WITH RAPID VENTRICULAR RESPONSE (HCC): ICD-10-CM

## 2022-07-21 PROCEDURE — G8417 CALC BMI ABV UP PARAM F/U: HCPCS | Performed by: INTERNAL MEDICINE

## 2022-07-21 PROCEDURE — 3017F COLORECTAL CA SCREEN DOC REV: CPT | Performed by: INTERNAL MEDICINE

## 2022-07-21 PROCEDURE — 99214 OFFICE O/P EST MOD 30 MIN: CPT | Performed by: INTERNAL MEDICINE

## 2022-07-21 PROCEDURE — 1123F ACP DISCUSS/DSCN MKR DOCD: CPT | Performed by: INTERNAL MEDICINE

## 2022-07-21 PROCEDURE — 1036F TOBACCO NON-USER: CPT | Performed by: INTERNAL MEDICINE

## 2022-07-21 PROCEDURE — G8427 DOCREV CUR MEDS BY ELIG CLIN: HCPCS | Performed by: INTERNAL MEDICINE

## 2022-07-21 NOTE — PROGRESS NOTES
[facility] - Office Note  Today's Date and Time: 7/21/2022, 11:18 AM    Diagnostic Impression :     1. Clostridium difficile diarrhea    2. History of prostate cancer    3. Atrial flutter with rapid ventricular response (HCC)    4. Radiation cystitis    5. Urge incontinence        Recommendations     7/1/22: Patient had re-occurrence of presumed C-diff after finishing a short course of PO Cipro. Patient prescribed PO Vancomycin 250 mg QID x 10 days with relief of symptoms within 24 hours. Repeat C-Diff antigen not detected on 7/18/22  Monitor for any relapse of symptoms     Chief complaint/reason for consultation:     Chief Complaint   Patient presents with    Frequent Infections     C-Diff Follow up        History of Present Illness:   Karlie Macdonald is a 70y.o.-year-old  male who was seen in the office on 7/21/2022. This patient was last seen in November of 2018. According to the last office note:  Pt reports that he had been traveling in [facility] in late October when he developed traveler's diarrhea. He took a 3 day course of Ciprofloxacin and his symptoms resolved. For one week after returning home, his bowel movements were normal.     He then had a radical prostatectomy on 11/9/18. He was discharged home on 11/11. He received antibiotic prophylaxis with a cephalosporin. On 11/14 he developed shaking chills, a fever to 102 F, and diarrhea. He presented to an outside hospital and a complete septic workup was completed. He was found to have C diff colitis and started on oral vancomycin. Within 24 hours his fevers and diarrhea were significantly improved. He was discharged home on 11/17 with a plan to complete 14 days of oral vancomycin. He did well with treatment. There was no relapse of diarrhea pot completion of treatment in 2018. CURRENT EVALUATION:    Office Visit 7/21/22:     The patient called the office on 7/1/22 and said his C-Diff was back. He requested po Vancomycin. He was requested to have a C-diff test completed and was prescribed PO Vancomycin 250 mg QID x 10 days until 7/11/22. He sent a stool sample for C-diff on 7/18/22 and C-diff was not detected. On this visit, the patient is doing well and is not experiencing any relapse of symptoms. Plan to follow-up as needed and he is to let the office know if he experiences any relapse of symptoms. 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 He had taken a 3 day course of Cipro approximately 1-2 weeks prior to surgery for a traveler's diarrhea while traveling [place]. It is likely that the combination of Ciprofloxacin and the antibiotics that he received during his surgical procedure altered the marquez of his gut enough to allow the C difficile to manifest. He rapidly improved with oral vancomycin and had no further problems in 2018. Developed additional problems with diarrhea on 7-1-22. He again improved rapidly on po Vancomycin. However, his C diff test was negative. PLAN:    7/1/22: Patient had re-occurrence of presumed C-diff after finishing a short course of PO Cipro. Patient prescribed PO Vancomycin 250 mg QID x 10 days with relief of symptoms within 24 hours. Repeat C-Diff antigen not detected on 7/18/22. Test completed after he had received po Vancomycin. Monitor for any relapse of symptoms       I have personally reviewed the past medical history, past surgical history, medications, social history, and family history, and I have updated the database accordingly.   Past Medical History:     Past Medical History:   Diagnosis Date    Abdominal pain 11/14/2018    Arthritis     Back pain     Benign essential HTN 06/01/2018    Caffeine use     1 coffee day    Cough 11/15/2018    Elevated CPK 04/05/2013    Elevated PSA measurement 03/23/2018    Frequent PVCs 06/01/2018    Gout     Hyperlipidemia 11/23/2018 Hypertension     Leukocytosis 11/15/2018    Need for prophylactic vaccination and inoculation against varicella 06/01/2018    Osteoarthritis     Osteoarthrosis 11/23/2018    Primary insomnia     Prostate cancer ([facility] [address] 11/04/2018    Rising PSA level 05/10/2018    Sleep apnea        Past Surgical  History:     Past Surgical History:   Procedure Laterality Date    CAPSULOTOMY, HAND      CARDIAC CATHETERIZATION      CARPAL TUNNEL RELEASE      COLONOSCOPY N/A 11/6/2019    COLONOSCOPY DIAGNOSTIC performed by Marko Lea MD at [facility]    CYSTOSCOPY  10/08/2020    bladder fulgration    CYSTOSCOPY      CYSTOSCOPY W BIOPSY OF BLADDER N/A 10/8/2020    CYSTOSCOPY, BLADDER BIOPSY FULGARATION performed by Cristina Bowling MD at [address]  2012    KS LAP,PROSTATECTOMY,RADICAL,W/NERVE SPARE,INCL ROBOTIC N/A 11/9/2018    ROBOTIC RADICAL PROSTATECTOMY WITH PELVIC LYMPH NODES DISSECTION performed by Chandrakant Orozco MD at [address] [facility]  51/56/6591    umbilical/ventral hernia repair possible mesh    UMBILICAL HERNIA REPAIR N/A 2/76/0646    OPEN UMBILICAL/VENTRAL HERNIA  REPAIR WITHOUT MESH performed by Sheela Rhoades DO at [address] [facility]  11/6/2019    EGD BIOPSY performed by Marko Lea MD at [facility]       Medications:     Current Outpatient Medications:     oxybutynin (DITROPAN XL) 15 MG extended release tablet, Take 1 tablet by mouth daily, Disp: 90 tablet, Rfl: 2    zolpidem (AMBIEN) 5 MG tablet, Take 2 tablets by mouth nightly as needed for Sleep for up to 90 days. , Disp: 180 tablet, Rfl: 0    amoxicillin (AMOXIL) 500 MG capsule, , Disp: , Rfl:     lisinopril (PRINIVIL;ZESTRIL) 10 MG tablet, Take 2 tablets by mouth in the morning and at bedtime Takes 20 mg in morning, 20 mg in the evening, Disp: 360 tablet, Rfl: 1    flecainide (TAMBOCOR) 100 MG tablet, Take 1 tablet by mouth 2 times daily, Disp: 180 tablet, Rfl: 1    cloNIDine (CATAPRES) 0.1 MG tablet, Take 1 tablet by mouth 2 times daily, Disp: 180 tablet, Rfl: 1    rosuvastatin (CRESTOR) 10 MG tablet, Take 1 tablet by mouth nightly, Disp: 90 tablet, Rfl: 1    meloxicam (MOBIC) 7.5 MG tablet, Take 1 tablet by mouth daily with food, Disp: 90 tablet, Rfl: 1    allopurinol (ZYLOPRIM) 300 MG tablet, TAKE 3/4 TABLET BY MOUTH DAILY, Disp: 90 tablet, Rfl: 1    amLODIPine (NORVASC) 5 MG tablet, Take 1 tablet by mouth 2 times daily, Disp: 180 tablet, Rfl: 1    furosemide (LASIX) 20 MG tablet, Take 10 mg by mouth See Admin Instructions Three times a week. , Disp: , Rfl:     nitroGLYCERIN (NITROSTAT) 0.4 MG SL tablet, Place 1 tablet under the tongue every 5 minutes as needed for Chest pain, Disp: 90 tablet, Rfl: 1    Multiple Vitamin (MULTI-VITAMIN) TABS, Take 1 tablet by mouth, Disp: , Rfl:      Social History:     Social History     Socioeconomic History    Marital status:      Spouse name: Not on file    Number of children: Not on file    Years of education: Not on file    Highest education level: Not on file   Occupational History    Not on file   Tobacco Use    Smoking status: Never    Smokeless tobacco: Never   Vaping Use    Vaping Use: Never used   Substance and Sexual Activity    Alcohol use:  Yes     Alcohol/week: 2.0 standard drinks     Types: 2 Cans of beer per week    Drug use: Never    Sexual activity: Not Currently   Other Topics Concern    Not on file   Social History Narrative    Not on file     Social Determinants of Health     Financial Resource Strain: Low Risk     Difficulty of Paying Living Expenses: Not hard at all   Food Insecurity: No Food Insecurity    Worried About Running Out of Food in the Last Year: Never true    Ran Out of Food in the Last Year: Never true   Transportation Needs: Not on file   Physical Activity: Not on file   Stress: Not on file   Social Connections: Not on file   Intimate Partner Violence: Not on file   Housing Stability: Not on file       Family History:     Family History   Problem Relation Age of Onset    Arrhythmia Mother     Dementia Mother     Prostate Cancer Father 71    Kidney Cancer Father         62s     Heart Failure Father     Heart Disease Father     Obesity Sister     Hypertension Sister     Obesity Sister     Hypertension Sister     Prostate Cancer Paternal Grandfather 76    Cancer Maternal Aunt         d. 48 unknown cancer         Allergies:   Phenothiazines, Sulfa antibiotics, and Prochlorperazine edisylate     Review of Systems:   Constitutional: No fevers or chills. No systemic complaints  Head: No headaches  Eyes: No double vision or blurry vision. ENT: No sore throat or runny nose. . No hearing loss, tinnitus or vertigo. Cardiovascular: No chest pain or palpitations. No shortness of breath. No [place]  Lung: No shortness of breath or cough. No sputum production  Abdomen: No nausea, vomiting, diarrhea, or abdominal pain. .  Genitourinary: No increased urinary frequency, or dysuria. No hematuria. No suprapubic or CVA pain  Musculoskeletal: No muscle aches or pains. No joint effusions, swelling or deformities  Hematologic: No bleeding or bruising. Neurologic: No headache, weakness, numbness, or tingling. Physical Examination :   There were no vitals taken for this visit. General Appearance: Awake, alert, and in no apparent distress  Head:  Normocephalic, no trauma  Eyes: Pupils equal, round, reactive, to light and accommodation; extraocular movements intact; sclera anicteric; conjunctivae pink. No embolic phenomena. ENT: Oropharynx clear, without erythema, exudate, or thrush. No tenderness of sinuses. Mouth/throat: mucosa pink and moist. No lesions. Dentition in good repair. Neck:Supple, without lymphadenopathy. Thyroid normal, No bruits. Pulmonary/Chest: Clear to auscultation, without wheezes, rales, or rhonchi. No dullness to percussion. Cardiovascular: Regular rate and rhythm without murmurs, rubs, or gallops. Abdomen: Soft, non tender. Bowel sounds normal. No organomegaly  All four Extremities: No cyanosis, clubbing, edema, or effusions. Neurologic: No gross sensory or motor deficits. Skin: Warm and dry with good turgor. No signs of peripheral arterial or venous insufficiency.     Medical Decision Making:   I have independently reviewed/ordered the following labs:    CBC with Differential:  Lab Results   Component Value Date/Time    WBC 4.8 12/20/2021 11:56 AM    WBC 4.1 09/15/2021 03:35 PM    HGB 13.9 12/20/2021 11:56 AM    HGB 13.2 09/15/2021 03:35 PM    HCT 42.7 12/20/2021 11:56 AM    HCT 40.1 09/15/2021 03:35 PM     12/20/2021 11:56 AM     09/15/2021 03:35 PM    SEGSPCT 73.2 11/11/2018 08:09 AM    SEGSPCT 80.2 11/10/2018 05:28 AM    LYMPHOPCT 11 02/04/2021 05:15 PM    LYMPHOPCT 14 02/25/2020 09:00 PM    LYMPHOPCT 10.8 11/17/2018 05:36 AM    LYMPHOPCT 10.4 11/16/2018 05:26 AM    MONOPCT 12 02/04/2021 05:15 PM    MONOPCT 15 02/25/2020 09:00 PM    MONOPCT 14.7 11/17/2018 05:36 AM    MONOPCT 13.9 11/16/2018 05:26 AM     BMP:   Lab Results   Component Value Date/Time     12/20/2021 11:56 AM     09/15/2021 03:35 PM    K 4.4 12/20/2021 11:56 AM    K 4.3 09/15/2021 03:35 PM    K 3.5 11/17/2018 05:36 AM    K 4.2 11/16/2018 05:26 AM     12/20/2021 11:56 AM     09/15/2021 03:35 PM    CO2 22 12/20/2021 11:56 AM    CO2 22 09/15/2021 03:35 PM    BUN 18 12/20/2021 11:56 AM    BUN 15 09/15/2021 03:35 PM    CREATININE 1.09 12/20/2021 11:56 AM    CREATININE 1.01 09/15/2021 03:35 PM    MG 2.2 02/05/2021 03:14 AM    MG 2.1 02/26/2020 09:57 AM     Hepatic Function Panel:  Lab Results   Component Value Date/Time    PROT 7.5 12/20/2021 11:56 AM    PROT 6.7 02/05/2021 03:14 AM    LABALBU 4.6 12/20/2021 11:56 AM    LABALBU 3.9 02/05/2021 03:14 AM    BILIDIR 0.13 02/04/2021 05:15 PM    IBILI 0.33 02/04/2021 05:15 PM    BILITOT 0.43 12/20/2021 11:56 AM    BILITOT 0.39 02/05/2021 03:14 AM    ALKPHOS 82 12/20/2021 11:56 AM    ALKPHOS 83 02/05/2021 03:14 AM    ALT 21 12/20/2021 11:56 AM    ALT 18 02/05/2021 03:14 AM    AST 27 12/20/2021 11:56 AM    AST 22 02/05/2021 03:14 AM     No results found for: RPR  No results found for: HIV  Lab Results   Component Value Date/Time    BC No growth after 5 days of incubation. 11/14/2018 09:03 PM     Lab Results   Component Value Date/Time    MUCUS NOT REPORTED 12/20/2021 11:56 AM    RBC 4.35 12/20/2021 11:56 AM    TRICHOMONAS NOT REPORTED 12/20/2021 11:56 AM    WBC 4.8 12/20/2021 11:56 AM    YEAST NOT REPORTED 12/20/2021 11:56 AM    TURBIDITY Clear 12/20/2021 11:56 AM     Lab Results   Component Value Date/Time    CREATININE 1.09 12/20/2021 11:56 AM    GLUCOSE 107 12/20/2021 11:56 AM       Thank you for allowing us to participate in the care of this patient. Please call with questions.     Tanya Galarza MD    Pager: [phone] - Office: [phone]

## 2022-08-15 DIAGNOSIS — Z76.0 MEDICATION REFILL: ICD-10-CM

## 2022-08-15 DIAGNOSIS — I10 ESSENTIAL HYPERTENSION: ICD-10-CM

## 2022-08-15 DIAGNOSIS — G47.00 INSOMNIA, UNSPECIFIED TYPE: ICD-10-CM

## 2022-08-16 RX ORDER — ZOLPIDEM TARTRATE 5 MG/1
TABLET ORAL
Qty: 180 TABLET | Refills: 1 | Status: SHIPPED | OUTPATIENT
Start: 2022-08-16 | End: 2022-11-14

## 2022-08-16 RX ORDER — LISINOPRIL 10 MG/1
TABLET ORAL
Qty: 360 TABLET | Refills: 1 | Status: SHIPPED | OUTPATIENT
Start: 2022-08-16

## 2022-08-16 NOTE — TELEPHONE ENCOUNTER
Health Maintenance   Topic Date Due    Flu vaccine (1) 09/01/2022    A1C test (Diabetic or Prediabetic)  03/02/2023    Lipids  03/02/2023    Depression Screen  03/02/2023    Annual Wellness Visit (AWV)  03/03/2023    Prostate Specific Antigen (PSA) Screening or Monitoring  04/19/2023    DTaP/Tdap/Td vaccine (2 - Td or Tdap) 05/10/2027    Colorectal Cancer Screen  11/06/2029    Shingles vaccine  Completed    Pneumococcal 65+ years Vaccine  Completed    COVID-19 Vaccine  Completed    Hepatitis C screen  Completed    Hepatitis A vaccine  Aged Out    Hepatitis B vaccine  Aged Out    Hib vaccine  Aged Out    Meningococcal (ACWY) vaccine  Aged Out             (applicable per patient's age: Cancer Screenings, Depression Screening, Fall Risk Screening, Immunizations)    Hemoglobin A1C (%)   Date Value   03/02/2022 5.7   09/05/2019 5.2   03/26/2019 5.7     LDL Cholesterol (mg/dL)   Date Value   03/02/2022 79     AST (U/L)   Date Value   12/20/2021 27     ALT (U/L)   Date Value   12/20/2021 21     BUN (mg/dL)   Date Value   12/20/2021 18      (goal A1C is < 7)   (goal LDL is <100) need 30-50% reduction from baseline     BP Readings from Last 3 Encounters:   07/21/22 108/70   03/02/22 137/82   02/10/22 116/70    (goal /80)      All Future Testing planned in [facility]:  Lab Frequency Next Occurrence   PSA, Diagnostic Once 03/28/2022   Lipid Panel Once 06/10/2022   Hemoglobin A1C Once 06/10/2022   PSA, Diagnostic Once 10/24/2022       Next Visit Date:  Future Appointments   Date Time Provider Carol Stephenson   8/24/2022  2:30 PM JHONNY Dillon - CNP [facility]   10/26/2022  3:00 PM Jg Giles MD [facility]            Patient Active Problem List:     Asymptomatic premature ventricular contractions     Primary insomnia     Prostate cancer ([address]     Gout     High triglycerides     Hypertension     Osteoarthrosis     Hypokalemia     Atrial flutter with rapid ventricular response ([facility] [address]     Frequency of micturition     Urge incontinence     History of prostate cancer     Gross hematuria     Renal cyst, right     Radiation cystitis     Class 2 obesity in adult     OAB (overactive bladder)     Clostridium difficile diarrhea

## 2022-08-17 DIAGNOSIS — Z76.0 MEDICATION REFILL: ICD-10-CM

## 2022-08-17 DIAGNOSIS — E78.00 PURE HYPERCHOLESTEROLEMIA: ICD-10-CM

## 2022-08-17 DIAGNOSIS — E79.0 HYPERURICEMIA: ICD-10-CM

## 2022-08-17 DIAGNOSIS — Z76.0 MEDICINE REFILL: ICD-10-CM

## 2022-08-17 DIAGNOSIS — I10 ESSENTIAL HYPERTENSION: ICD-10-CM

## 2022-08-17 DIAGNOSIS — M25.50 ARTHRALGIA OF MULTIPLE JOINTS: ICD-10-CM

## 2022-08-17 RX ORDER — ALLOPURINOL 300 MG/1
TABLET ORAL
Qty: 90 TABLET | Refills: 1 | Status: SHIPPED | OUTPATIENT
Start: 2022-08-17

## 2022-08-17 RX ORDER — FLECAINIDE ACETATE 100 MG/1
100 TABLET ORAL 2 TIMES DAILY
Qty: 180 TABLET | Refills: 1 | Status: SHIPPED | OUTPATIENT
Start: 2022-08-17 | End: 2022-09-16

## 2022-08-17 RX ORDER — ROSUVASTATIN CALCIUM 10 MG/1
10 TABLET, COATED ORAL NIGHTLY
Qty: 90 TABLET | Refills: 1 | Status: SHIPPED | OUTPATIENT
Start: 2022-08-17

## 2022-08-17 RX ORDER — MELOXICAM 7.5 MG/1
7.5 TABLET ORAL DAILY
Qty: 90 TABLET | Refills: 1 | Status: SHIPPED | OUTPATIENT
Start: 2022-08-17

## 2022-08-17 RX ORDER — CLONIDINE HYDROCHLORIDE 0.1 MG/1
0.1 TABLET ORAL 2 TIMES DAILY
Qty: 180 TABLET | Refills: 1 | Status: SHIPPED | OUTPATIENT
Start: 2022-08-17

## 2022-08-17 RX ORDER — AMLODIPINE BESYLATE 5 MG/1
5 TABLET ORAL 2 TIMES DAILY
Qty: 180 TABLET | Refills: 1 | Status: SHIPPED | OUTPATIENT
Start: 2022-08-17

## 2022-08-17 NOTE — TELEPHONE ENCOUNTER
Health Maintenance   Topic Date Due    Flu vaccine (1) 09/01/2022    A1C test (Diabetic or Prediabetic)  03/02/2023    Lipids  03/02/2023    Depression Screen  03/02/2023    Annual Wellness Visit (AWV)  03/03/2023    Prostate Specific Antigen (PSA) Screening or Monitoring  04/19/2023    DTaP/Tdap/Td vaccine (2 - Td or Tdap) 05/10/2027    Colorectal Cancer Screen  11/06/2029    Shingles vaccine  Completed    Pneumococcal 65+ years Vaccine  Completed    COVID-19 Vaccine  Completed    Hepatitis C screen  Completed    Hepatitis A vaccine  Aged Out    Hepatitis B vaccine  Aged Out    Hib vaccine  Aged Out    Meningococcal (ACWY) vaccine  Aged Out             (applicable per patient's age: Cancer Screenings, Depression Screening, Fall Risk Screening, Immunizations)    Hemoglobin A1C (%)   Date Value   03/02/2022 5.7   09/05/2019 5.2   03/26/2019 5.7     LDL Cholesterol (mg/dL)   Date Value   03/02/2022 79     AST (U/L)   Date Value   12/20/2021 27     ALT (U/L)   Date Value   12/20/2021 21     BUN (mg/dL)   Date Value   12/20/2021 18      (goal A1C is < 7)   (goal LDL is <100) need 30-50% reduction from baseline     BP Readings from Last 3 Encounters:   07/21/22 108/70   03/02/22 137/82   02/10/22 116/70    (goal /80)      All Future Testing planned in [facility]:  Lab Frequency Next Occurrence   PSA, Diagnostic Once 03/28/2022   Lipid Panel Once 06/10/2022   Hemoglobin A1C Once 06/10/2022   PSA, Diagnostic Once 10/24/2022       Next Visit Date:  Future Appointments   Date Time Provider Carol Stephenson   8/24/2022  2:30 PM JHONNY Lopez - CNP [facility]   10/26/2022  3:00 PM Ji Lyons MD [facility]            Patient Active Problem List:     Asymptomatic premature ventricular contractions     Primary insomnia     Prostate cancer ([facility] [address]     Gout     High triglycerides     Hypertension     Osteoarthrosis     Hypokalemia     Atrial flutter with rapid ventricular response ([facility] [address]     Frequency of micturition     Urge incontinence     History of prostate cancer     Gross hematuria     Renal cyst, right     Radiation cystitis     Class 2 obesity in adult     OAB (overactive bladder)     Clostridium difficile diarrhea

## 2023-02-17 DIAGNOSIS — E78.00 PURE HYPERCHOLESTEROLEMIA: ICD-10-CM

## 2023-02-17 DIAGNOSIS — Z76.0 MEDICATION REFILL: ICD-10-CM

## 2023-02-17 DIAGNOSIS — E79.0 HYPERURICEMIA: ICD-10-CM

## 2023-02-17 DIAGNOSIS — I10 ESSENTIAL HYPERTENSION: ICD-10-CM

## 2023-02-17 RX ORDER — AMLODIPINE BESYLATE 5 MG/1
TABLET ORAL
Qty: 180 TABLET | Refills: 1 | OUTPATIENT
Start: 2023-02-17

## 2023-02-17 RX ORDER — ROSUVASTATIN CALCIUM 10 MG/1
TABLET, COATED ORAL
Qty: 90 TABLET | Refills: 1 | OUTPATIENT
Start: 2023-02-17

## 2023-02-17 RX ORDER — ALLOPURINOL 300 MG/1
TABLET ORAL
Qty: 68 TABLET | Refills: 1 | OUTPATIENT
Start: 2023-02-17

## 2023-02-17 RX ORDER — CLONIDINE HYDROCHLORIDE 0.1 MG/1
TABLET ORAL
Qty: 180 TABLET | Refills: 1 | OUTPATIENT
Start: 2023-02-17

## 2023-04-03 DIAGNOSIS — Z76.0 MEDICATION REFILL: ICD-10-CM

## 2023-04-03 DIAGNOSIS — I10 ESSENTIAL HYPERTENSION: ICD-10-CM

## 2023-04-06 DIAGNOSIS — G47.00 INSOMNIA, UNSPECIFIED TYPE: ICD-10-CM

## 2023-04-06 DIAGNOSIS — Z76.0 MEDICATION REFILL: ICD-10-CM

## 2023-04-06 DIAGNOSIS — E78.00 PURE HYPERCHOLESTEROLEMIA: ICD-10-CM

## 2023-04-06 DIAGNOSIS — I10 ESSENTIAL HYPERTENSION: ICD-10-CM

## 2023-04-10 RX ORDER — LISINOPRIL 10 MG/1
TABLET ORAL
Qty: 360 TABLET | Refills: 1 | OUTPATIENT
Start: 2023-04-10

## 2023-04-11 RX ORDER — AMLODIPINE BESYLATE 5 MG/1
5 TABLET ORAL DAILY
Qty: 180 TABLET | Refills: 1 | OUTPATIENT
Start: 2023-04-11 | End: 2024-04-11

## 2023-04-11 RX ORDER — ZOLPIDEM TARTRATE 5 MG/1
5 TABLET ORAL NIGHTLY PRN
Qty: 180 TABLET | Refills: 1 | OUTPATIENT
Start: 2023-04-11 | End: 2023-07-10

## 2023-04-11 RX ORDER — CLONIDINE HYDROCHLORIDE 0.1 MG/1
0.1 TABLET ORAL 2 TIMES DAILY
Qty: 180 TABLET | Refills: 1 | OUTPATIENT
Start: 2023-04-11 | End: 2024-04-11

## 2023-04-11 RX ORDER — ROSUVASTATIN CALCIUM 10 MG/1
10 TABLET, COATED ORAL DAILY
Qty: 90 TABLET | Refills: 1 | OUTPATIENT
Start: 2023-04-11 | End: 2024-04-11

## (undated) DEVICE — ELECTRO LUBE IS A SINGLE PATIENT USE DEVICE THAT IS INTENDED TO BE USED ON ELECTROSURGICAL ELECTRODES TO REDUCE STICKING.: Brand: KEY SURGICAL ELECTRO LUBE

## (undated) DEVICE — FORCEPS BX L240CM WRK CHN 2.8MM STD CAP W/ NDL MIC MESH

## (undated) DEVICE — SYRINGE CATH TIP 50ML

## (undated) DEVICE — 35 ML SYRINGE LUER-LOCK TIP: Brand: MONOJECT

## (undated) DEVICE — DRESSING TRNSPAR W5XL4.5IN FLM SHT SEMIPERMEABLE WIND

## (undated) DEVICE — GLOVE ORANGE PI 7   MSG9070

## (undated) DEVICE — AIRSEAL 12 MM ACCESS PORT AND PALM GRIP OBTURATOR WITH BLADELESS OPTICAL TIP, 120 MM LENGTH: Brand: AIRSEAL

## (undated) DEVICE — SOLUTION IV IRRIG WATER 1000ML POUR BRL 2F7114

## (undated) DEVICE — DRAPE,ROBOTICS,STERILE: Brand: MEDLINE

## (undated) DEVICE — TUBING, SUCTION, 1/4" X 20', STRAIGHT: Brand: MEDLINE INDUSTRIES, INC.

## (undated) DEVICE — GARMENT,MEDLINE,DVT,INT,CALF,MED, GEN2: Brand: MEDLINE

## (undated) DEVICE — PACK PROC LAP II AURORA

## (undated) DEVICE — APPLICATOR ENDOSCP L34CM W/ S STL CANN PLAS OBT STYL FOR

## (undated) DEVICE — SOLUTION ANTIFOG VIS SYS CLEARIFY LAPSCP

## (undated) DEVICE — TRI-LUMEN FILTERED TUBE SET WITH ACTIVATED CHARCOAL FILTER: Brand: AIRSEAL

## (undated) DEVICE — BLADE CLIPPER GEN PURP NS

## (undated) DEVICE — CATHETER,FOLEY,SILI-ELAST,LTX,20FR,10ML: Brand: MEDLINE

## (undated) DEVICE — GLOVE SURG SZ 65 THK91MIL LTX FREE SYN POLYISOPRENE

## (undated) DEVICE — NEEDLE INSUF L120MM DIA2MM DISP FOR PNEUMOPERI ENDOPATH

## (undated) DEVICE — ADHESIVE SKIN CLSR 0.7ML TOP DERMBND ADV

## (undated) DEVICE — PACK PROCEDURE SURG SET UP SRMC

## (undated) DEVICE — CONTAINER,SPECIMEN,PNEU TUBE,4OZ,OR STRL: Brand: MEDLINE

## (undated) DEVICE — PATIENT RETURN ELECTRODE, SINGLE-USE, CONTACT QUALITY MONITORING, ADULT, WITH 9FT CORD, FOR PATIENTS WEIGING OVER 33LBS. (15KG): Brand: MEGADYNE

## (undated) DEVICE — Z DISCONTINUED BY MEDLINE USE 2711682 TRAY SKIN PREP DRY W/ PREM GLV

## (undated) DEVICE — GLOVE ORANGE PI 8   MSG9080

## (undated) DEVICE — SUTURE MCRYL SZ 3-0 L27IN ABSRB VLT L17MM RB-1 1/2 CIR Y305H

## (undated) DEVICE — SUTURE PDS II SZ 0 L36IN ABSRB VLT L36MM CT-1 1/2 CIR Z346H

## (undated) DEVICE — CORD ES L10FT BLU MPLR FT SWCH DISP ACMI

## (undated) DEVICE — SOLUTION SURG PREP POV IOD 7.5% 4 OZ

## (undated) DEVICE — Device

## (undated) DEVICE — BASIC SINGLE BASIN BTC-LF: Brand: MEDLINE INDUSTRIES, INC.

## (undated) DEVICE — GAUZE,SPONGE,2"X2",8PLY,STERILE,LF,2'S: Brand: MEDLINE

## (undated) DEVICE — GAUZE,SPONGE,8"X4",12PLY,XRAY,STRL,LF: Brand: MEDLINE

## (undated) DEVICE — SUTURE MCRYL + SZ 4-0 L27IN ABSRB UD L19MM PS-2 3/8 CIR MCP426H

## (undated) DEVICE — 3M™ WARMING BLANKET, UPPER BODY, 10 PER CASE, 42268: Brand: BAIR HUGGER™

## (undated) DEVICE — PROTECTOR ULN NRV PUR FOAM HK LOOP STRP ANATOMICALLY

## (undated) DEVICE — GLOVE ORANGE PI 7 1/2   MSG9075

## (undated) DEVICE — INTENDED FOR TISSUE SEPARATION, AND OTHER PROCEDURES THAT REQUIRE A SHARP SURGICAL BLADE TO PUNCTURE OR CUT.: Brand: BARD-PARKER ® CARBON RIB-BACK BLADES

## (undated) DEVICE — SYRINGE MED 10ML LUERLOCK TIP W/O SFTY DISP

## (undated) DEVICE — MHPB GEN MINOR PACK: Brand: MEDLINE INDUSTRIES, INC.

## (undated) DEVICE — ELECTRODE ES L3IN S STL BLDE INSUL DISP VALLEYLAB EDGE

## (undated) DEVICE — BLADELESS OBTURATOR: Brand: WECK VISTA

## (undated) DEVICE — TIP COVER ACCESSORY

## (undated) DEVICE — Z DUP USE 2641840 CLIP INT L POLYMER LOK LIG HEM O LOK

## (undated) DEVICE — HYPODERMIC SAFETY NEEDLE: Brand: MAGELLAN

## (undated) DEVICE — TISSUE RETRIEVAL SYSTEM: Brand: INZII RETRIEVAL SYSTEM

## (undated) DEVICE — COVER EQUIP STEEP TREND EZ CLN UP WTRPRF DISP FOR STD SZ

## (undated) DEVICE — AGENT HEMSTAT W2XL14IN OXIDIZED REGENERATED CELOS ABSRB FOR

## (undated) DEVICE — JELLY,LUBE,STERILE,FLIP TOP,TUBE,2-OZ: Brand: MEDLINE

## (undated) DEVICE — ARM DRAPE

## (undated) DEVICE — SOLUTION IV 1000ML 0.9% SOD CHL PH 5 INJ USP VIAFLX PLAS

## (undated) DEVICE — ELECTRODE PT RET AD L9FT HI MOIST COND ADH HYDRGEL CORDED

## (undated) DEVICE — BLADE LARYNSCP SZ 3 ENH DIR INTUB GLIDESCOPE MCGRATH MAC

## (undated) DEVICE — CANNULA SEAL

## (undated) DEVICE — GLOVE ORANGE PI 8 1/2   MSG9085

## (undated) DEVICE — SUTURE VCRL SZ 3-0 L18IN ABSRB UD PS-2 L19MM 1/2 CIR J497G

## (undated) DEVICE — DRESSING TRNSPAR W2XL2.75IN FLM SHT SEMIPERMEABLE WIND

## (undated) DEVICE — CHLORAPREP 26ML ORANGE

## (undated) DEVICE — DRAPE,REIN 53X77,STERILE: Brand: MEDLINE

## (undated) DEVICE — TOTAL TRAY, DB, 100% SILI FOLEY, 16FR 10: Brand: MEDLINE

## (undated) DEVICE — PREP SOL PVP IODINE 4%  4 OZ/BTL

## (undated) DEVICE — STRAP,POSITIONING,KNEE/BODY,FOAM,4X60": Brand: MEDLINE

## (undated) DEVICE — SOLUTION IV IRRIG POUR BRL 0.9% SODIUM CHL 2F7124

## (undated) DEVICE — CATHETER,FOLEY,SILI-ELAST,LTX,18FR,10ML: Brand: MEDLINE

## (undated) DEVICE — PUMP SUC IRR TBNG L10FT W/ HNDPC ASSEMB STRYKEFLOW 2

## (undated) DEVICE — PACK PROCEDURE SURG CYSTO SVMMC LF

## (undated) DEVICE — DRAINBAG,ANTI-REFLUX TOWER,L/F,2000ML,LL: Brand: MEDLINE

## (undated) DEVICE — NEPTUNE E-SEP SMOKE EVACUATION PENCIL, COATED, 70MM BLADE, PUSH BUTTON SWITCH: Brand: NEPTUNE E-SEP

## (undated) DEVICE — GOWN,SIRUS,NONRNF,SETINSLV,XL,20/CS: Brand: MEDLINE

## (undated) DEVICE — COLUMN DRAPE

## (undated) DEVICE — SUTURE SZ 0 27IN 5/8 CIR UR-6  TAPER PT VIOLET ABSRB VICRYL J603H